# Patient Record
Sex: FEMALE | Employment: STUDENT | URBAN - METROPOLITAN AREA
[De-identification: names, ages, dates, MRNs, and addresses within clinical notes are randomized per-mention and may not be internally consistent; named-entity substitution may affect disease eponyms.]

---

## 2017-01-04 ENCOUNTER — COMMUNICATION - HEALTHEAST (OUTPATIENT)
Dept: FAMILY MEDICINE | Facility: CLINIC | Age: 22
End: 2017-01-04

## 2017-05-30 ENCOUNTER — COMMUNICATION - HEALTHEAST (OUTPATIENT)
Dept: FAMILY MEDICINE | Facility: CLINIC | Age: 22
End: 2017-05-30

## 2017-05-30 DIAGNOSIS — F32.A DEPRESSION: ICD-10-CM

## 2017-10-13 ENCOUNTER — COMMUNICATION - HEALTHEAST (OUTPATIENT)
Dept: FAMILY MEDICINE | Facility: CLINIC | Age: 22
End: 2017-10-13

## 2017-10-13 DIAGNOSIS — F32.A DEPRESSION: ICD-10-CM

## 2018-01-19 ENCOUNTER — COMMUNICATION - HEALTHEAST (OUTPATIENT)
Dept: FAMILY MEDICINE | Facility: CLINIC | Age: 23
End: 2018-01-19

## 2018-01-19 DIAGNOSIS — F32.A DEPRESSION: ICD-10-CM

## 2020-02-06 ENCOUNTER — TELEPHONE (OUTPATIENT)
Dept: OTHER | Facility: OUTPATIENT CENTER | Age: 25
End: 2020-02-06

## 2020-02-06 NOTE — TELEPHONE ENCOUNTER
[facility] Telephone Intake    Date:  2020  Client Name:  Ramila Woods  Preferred Name: Obie (they/them)  MRN:  [id number]   :  1995       Age:  24 year old     Presenting Problem / Reason for Assessment   (Clinical History &Symptoms):      Pt is interested in establishing care at [facility], also looking for a referral to see Morteza.     Suggested Program:TG     Seen Other Providers (if so, where):  M.D. :  Saad hernandez [place]  Therapist:  therapist  Psychiatrist:  No    Referral Source:  pcp Saad Tierney    Follow Up:    Insurance Benefits to be evaluated.  Note will be entered when validated.    Patient wishes to be contacted regarding Insurance benefits:  Yes-pt about to turn 25 in 2 days, worried their coverage will end.     Please Verify Registration    Please send Welcome Packet and document date sent.

## 2020-02-07 NOTE — TELEPHONE ENCOUNTER
.Per: BENNIE diaz/ [employer]  Copay$ 0   Ded$ 0; Met$ 0   Coins 20%    Out of Pocket Max$ 3,000 ; Met$ 202.41     Psych testing require auth ([id number])? yes  Extended therapy require auth (78116)?  no    Exclusions:   Family Therapy ([id number])  NO    Marriage couple counseling  YES   Transgender/Gender Dysphoria no   CSB no   Sexual dysfunction yes    Patient Contacted about benefits:  LEFT VOICEMAIL  Date contacted: 02/07/2020

## 2020-02-13 ENCOUNTER — OFFICE VISIT (OUTPATIENT)
Dept: OTHER | Facility: OUTPATIENT CENTER | Age: 25
End: 2020-02-13
Payer: MEDICAID

## 2020-02-13 DIAGNOSIS — F33.41 RECURRENT MAJOR DEPRESSIVE DISORDER, IN PARTIAL REMISSION (H): Primary | ICD-10-CM

## 2020-02-13 DIAGNOSIS — F41.1 GAD (GENERALIZED ANXIETY DISORDER): ICD-10-CM

## 2020-02-13 ASSESSMENT — ANXIETY QUESTIONNAIRES
6. BECOMING EASILY ANNOYED OR IRRITABLE: NOT AT ALL
7. FEELING AFRAID AS IF SOMETHING AWFUL MIGHT HAPPEN: SEVERAL DAYS
3. WORRYING TOO MUCH ABOUT DIFFERENT THINGS: MORE THAN HALF THE DAYS
1. FEELING NERVOUS, ANXIOUS, OR ON EDGE: NEARLY EVERY DAY
GAD7 TOTAL SCORE: 13
2. NOT BEING ABLE TO STOP OR CONTROL WORRYING: MORE THAN HALF THE DAYS
5. BEING SO RESTLESS THAT IT IS HARD TO SIT STILL: MORE THAN HALF THE DAYS

## 2020-02-13 ASSESSMENT — PATIENT HEALTH QUESTIONNAIRE - PHQ9
SUM OF ALL RESPONSES TO PHQ QUESTIONS 1-9: 21
5. POOR APPETITE OR OVEREATING: NEARLY EVERY DAY
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
## 2020-02-14 ASSESSMENT — ANXIETY QUESTIONNAIRES: GAD7 TOTAL SCORE: 13

## 2020-03-05 ENCOUNTER — TELEPHONE (OUTPATIENT)
Dept: PLASTIC SURGERY | Facility: CLINIC | Age: 25
End: 2020-03-05

## 2020-03-05 ENCOUNTER — OFFICE VISIT (OUTPATIENT)
Dept: OTHER | Facility: OUTPATIENT CENTER | Age: 25
End: 2020-03-05
Payer: MEDICAID

## 2020-03-05 DIAGNOSIS — F41.1 GAD (GENERALIZED ANXIETY DISORDER): ICD-10-CM

## 2020-03-05 DIAGNOSIS — F64.0 GENDER DYSPHORIA IN ADOLESCENT AND ADULT: Primary | ICD-10-CM

## 2020-03-05 DIAGNOSIS — F64.0 GENDER DYSPHORIA IN ADOLESCENT AND ADULT: ICD-10-CM

## 2020-03-05 DIAGNOSIS — F33.41 RECURRENT MAJOR DEPRESSIVE DISORDER, IN PARTIAL REMISSION (H): Primary | ICD-10-CM
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
## 2020-03-05 NOTE — TELEPHONE ENCOUNTER
[facility]:  Care Coordination Note     SITUATION   Obie Woods (they/them) is a 25 year old adult who is receiving support for:  Clinic Care Coordination - Face To Face (Scheduled top surgery consult with Dr. Justice)  .    BACKGROUND     Pt is interested in top surgery.     Pt may be a canidate for keyhole     Pt is not on hormones, not diabetic and is not a smoker.    Pt is working with Evaristo Reina @ [facility] to obtain LOS.    ASSESSMENT     Surgery              CGC Assessment  Comprehensive Gender Care (CGC) Enrollment: Enrolled  Patient has a therapist: Yes  Name of therapist: Tal Reina @ [facility]  Therapist's phone number: [phone]  Letter of support #1: Requested  Surgery being considered: Yes  Mastectomy: Yes  Mammogram completed: No          PLAN     Follow-up plan:  Pt is to attend scheduled consult with Dr. Justice 12/8/2020       Brendan Salmeron

## 2020-04-16 ENCOUNTER — VIRTUAL VISIT (OUTPATIENT)
Dept: OTHER | Facility: OUTPATIENT CENTER | Age: 25
End: 2020-04-16
Payer: MEDICAID

## 2020-04-16 DIAGNOSIS — F64.0 GENDER DYSPHORIA IN ADOLESCENT AND ADULT: Primary | ICD-10-CM

## 2020-04-16 DIAGNOSIS — F41.1 GAD (GENERALIZED ANXIETY DISORDER): ICD-10-CM

## 2020-04-16 NOTE — PROGRESS NOTES
Telemedicine Visit: The patient's condition can be safely assessed and treated via synchronous audio and visual telemedicine encounter.      Reason for Telemedicine Visit: Services only offered telehealth    Originating Site (Patient Location): Patient's home    Distant Site (Provider Location): Provider Remote Setting    Consent:  The patient/guardian has verbally consented to: the potential risks and benefits of telemedicine (video visit) versus in person care; bill my insurance or make self-payment for services provided; and responsibility for payment of non-covered services.     Mode of Communication:  Video Conference via [employer]. Video took 20 minutes to connect because of slow system problems, so the session began over phone and switched to video once it was able to connect.    As the provider I attest to compliance with applicable laws and regulations related to telemedicine.      [facility] -  Case Progress Note    Date of Service: 20   Name: Ramila Woods  : 1995  Medical Record Number: [id number]  Treating Provider: Tal Reina, Ph.D.,   Type of Session: Individual  Present in Session: Tal Ragland  Number of Minutes:  54    Current Symptoms/Status:  Client reports ongoing concerns related to gender dysphoria, and navigating interpersonal relationships.    Progress Toward Treatment Goals:   Client reported that they had to move to [place] after they were removed from their dorm because of Covid-19. Client reported that they have been having difficulty sleeping, with a lot of problems related to falling asleep. Client also discussed connecting recently with their friend and spending a week with her. Client described their experience in past relationships with family members who have had problems with substance use and addiction, and how they have a hard time trusting anyone else because they have seen how someone can change suddenly as a result of substance  "use. Client identified that their friend hasn't exhibited any behavior that would make them worried, but that it is difficult to trust and to commit. We discussed what \"commitment\" means to them, and Client identified having a lot of expectations that were tied to larger societal scripts. We also discussed ways they could communicate with their friend about their concerns.    Intervention: Modality and Description:  Communication strategies related to how they can talk with their friend were discussed. Emotion-focused techniques were also employed to help Client explore the feelings that were coming up for them related to their difficulties trusting others. Cognitive strategies to help client unpack their assumptions regarding relational constructs (e.g., commitment) were also used. Numerous strategies for addressing insomnia were also discussed, including limiting caffeine intake, setting an alarm to wake up earlier in the morning, mindfulness meditation before bed, and getting out of bed to read for a few minutes if they are having difficulty falling asleep.    Response to Intervention:  Client was open and engaged and appeared to deeply consider the interventions employed. Reframing strategies for helping them shift their question, \"is my relationship normal\" to \"what do I want and need in my relationship\" were particularly helpful for client in understanding other important questions that were coming up for them about their relationship.    Assignment:  None given at this time.    DSM-5 Diagnoses:  F64.0, F41.1    Plan/Need for Future Services:  Return for therapy in 1-2 weeks to treat diagnosed problems.      Tal Reina, Ph.D., LP      "

## 2020-09-28 ENCOUNTER — VIRTUAL VISIT (OUTPATIENT)
Dept: OTHER | Facility: OUTPATIENT CENTER | Age: 25
End: 2020-09-28
Payer: COMMERCIAL

## 2020-09-28 DIAGNOSIS — F41.1 GAD (GENERALIZED ANXIETY DISORDER): ICD-10-CM

## 2020-09-28 DIAGNOSIS — F64.0 GENDER DYSPHORIA IN ADULT: Primary | ICD-10-CM

## 2020-09-28 NOTE — PROGRESS NOTES
[facility] -  Case Progress Note    Date of Service: 20   Name: Ramila Barnes)  : 1995  Medical Record Number: [id number]  Treating Provider: Nabil Bahena, PhD  Type of Session: Individual  Present in Session:Nabil Ragland  Number of Minutes: 53  Video start time: 10:06am  Video end time: 10:59am    Telemedicine Visit: The patient's condition can be safely assessed and treated via synchronous audio and visual telemedicine encounter.       Reason for Telemedicine Visit: Services only offered telehealth     Originating Site (Patient Location): Patient's home     Distant Site (Provider Location): Provider Remote Setting     Consent:  The patient/guardian has verbally consented to: the potential risks and benefits of telemedicine (video visit) versus in person care; bill my insurance or make self-payment for services provided; and responsibility for payment of non-covered services.      Mode of Communication:  Video Conference via [employer]. Session began at 10:06am and ended at 10:59am without any technical difficulties.     As the provider I attest to compliance with applicable laws and regulations related to telemedicine.    Current Symptoms/Status:  Based on available records, client has an established history of experiencing gender dysphoria, psychological distress stemming from incongruence with gender identity and sex assigned at birth exacerbated by primary and secondary sex characteristics. Client reported experiencing the above-mentioned symptoms as well as several depressive symptoms, including social isolation, lack of interest, and sadness. Client reported having difficulties with falling and staying asleep, and experiencing low energy, lethargy, and flattened mood. Client reports ongoing concerns related to gender dysphoria and navigating interpersonal relationships. Client reported a history of family trauma related to substance use and addiction, difficulties being  accepted as non-binary by family, and navigating societal expectations of relationships. Client reported having difficulties with falling and staying asleep, and experiencing low energy, lethargy, and flattened mood. Client reported a history of family trauma related to substance use and addiction, difficulties being accepted as non-binary by family, and navigating societal expectations of relationships.    Progress Toward Treatment Goals:   This was the client's first individual psychotherapy session with writer (case was transferred from Dr. Tal Reina, PhD, LP). Client was able to identify the following goals for treatment: (1) emotionally work through transition-related issues, (2) improve communication in relationship (open discussion of boundaries, etc.), (3) learn new skills to cope with feelings of gender dysphoria, and (4) work toward building trust in their new relationship. Lastly, we discussed expectations regarding individual psychotherapy and client requested to meet every two weeks. Writer instructed client to contact the  for assistance with scheduling.    Intervention: Modality and Description:  The beginning of the session focused on obtaining client's psychosocial history, treatment history, identifying goals for individual psychotherapy work with writer and building rapport. We explored the client's communication style and potential barriers to articulating their needs. Cognitive behavioral strategies were used help patient identify how some of their beliefs were associated and informed by negative automatic thoughts fueling expectations that others will disappoint them in relationships. Guided imagery was used to help client visualize what their hopes/fantasies/goals are for their relationship. Psychoeducation was offered concerning sleep hygiene and strategies for addressing insomnia (e.g. monitoring caffeine intake later in the afternoon/evening, etc.), as well as client's concerns  that their current medication might be causing increased lethargy, and low energy and mood. Client reported that they just moved back to [place] from [place] and has been living with her partner for about 3 weeks. Writer instructed client to pay attention to changes in their mood and energy level as well as changes (e.g. moving from [place] to [place], living with their partner, etc.) in their living environment and report those symptoms to their psychiatrist. We discussed what the client's hopes are regarding this new relationship and ways that they can communicate their needs openly with their partner to foster greater sense of emotional safety.     Response to Intervention:  Client was open, engaged, and receptive to strategies and interventions used in session. Client was collaborative in sharing their psychosocial history and disclosing recent relational challenges. Client stated they would like to work toward having more open conversations with their partners about both of their boundaries and would try to do so prior to our next session.    Assignment:  Client will work toward having an open conversation about partner's boundaries.    DSM-5 Diagnoses:  302.85 (F64.0)  Gender Dysphoria in adolescent and adult  300.02 (F41.1) Generalized Anxiety Disorder    Plan/Need for Future Services:  Return for therapy in 2 weeks to treat diagnosed problems.        Nabil Bahena, PhD   Postdoctoral Fellow

## 2020-10-12 ENCOUNTER — TELEPHONE (OUTPATIENT)
Dept: PSYCHOLOGY | Facility: CLINIC | Age: 25
End: 2020-10-12

## 2020-10-13 NOTE — PROGRESS NOTES
Writer contacted [facility] on 10/7/20 at at 11:05am for scheduled individual psychotherapy appointment. Client was driving and stated that they received a message from [facility] stating that their appointment would be taking place in person at the clinic. Client drove to the clinic and spoke with  staff who informed them that appointments were taking place virtually. Client was unable to meet for virtual appointment as they were driving back home. Writer asked client to contact the  and reschedule their session.     Nabil Bahena, Ph.D.  Postdoctoral Fellow

## 2020-10-19 ENCOUNTER — VIRTUAL VISIT (OUTPATIENT)
Dept: PSYCHOLOGY | Facility: CLINIC | Age: 25
End: 2020-10-19
Payer: COMMERCIAL

## 2020-10-19 DIAGNOSIS — F64.0 GENDER DYSPHORIA IN ADOLESCENT AND ADULT: Primary | ICD-10-CM

## 2020-10-19 DIAGNOSIS — F41.1 GENERALIZED ANXIETY DISORDER: ICD-10-CM

## 2020-10-19 PROCEDURE — 90837 PSYTX W PT 60 MINUTES: CPT | Mod: U7 | Performed by: STUDENT IN AN ORGANIZED HEALTH CARE EDUCATION/TRAINING PROGRAM

## 2020-10-19 PROCEDURE — 99207 PR NO CHARGE LOS: CPT | Performed by: STUDENT IN AN ORGANIZED HEALTH CARE EDUCATION/TRAINING PROGRAM

## 2020-10-24 NOTE — TELEPHONE ENCOUNTER
Writer contacted [facility] on 10/7/20 at at 11:05am for scheduled individual psychotherapy appointment. Client was driving and stated that they received a message from [facility] stating that their appointment would be taking place in person at the clinic. Client drove to the clinic and spoke with  staff who informed them that appointments were taking place virtually. Client was unable to meet for virtual appointment as they were driving back home. Writer asked client to contact the  and reschedule their session.     Nabil Bahena, Ph.D.  Postdoctoral Fellow

## 2020-10-25 NOTE — PROGRESS NOTES
[place] [facility] -  Case Progress Note    Date of Service: 10/19/20   Name: Ramila Barnes)  : 1995  Medical Record Number: [id number]  Treating Provider: Nabil Bahena, PhD  Type of Session: Individual  Present in Session: Nabil Ragland  Number of Minutes:  53  Video start time: 10:02am  Video end time: 10:55am    Telemedicine Visit: The patient's condition can be safely assessed and treated via synchronous audio and visual telemedicine encounter.       Reason for Telemedicine Visit: Services only offered telehealth     Originating Site (Patient Location): Patient's home     Distant Site (Provider Location): Provider Remote Setting     Consent:  The patient/guardian has verbally consented to: the potential risks and benefits of telemedicine (video visit) versus in person care; bill my insurance or make self-payment for services provided; and responsibility for payment of non-covered services.      Mode of Communication:  Video Conference via [employer]. Session began at 10:02am and ended at 10:55am without any technical difficulties.     As the provider I attest to compliance with applicable laws and regulations related to telemedicine.    Current Symptoms/Status:  Based on available records, client has an established history of experiencing gender dysphoria, psychological distress stemming from incongruence with gender identity and sex assigned at birth exacerbated by primary and secondary sex characteristics. Client reported experiencing the above-mentioned symptoms as well as several depressive symptoms, including social isolation, lack of interest, and sadness. Client reports ongoing concerns related to gender dysphoria and navigating interpersonal relationships. Client reported a history of family trauma related to substance use and addiction, difficulties being accepted as non-binary by family, and navigating societal expectations of relationships. Client reported having difficulties with  falling and staying asleep, and experiencing low energy, lethargy, and flattened mood.     Progress Toward Treatment Goals:   Client reported making  progress in their interpersonal relationships. Client expressed commitment to working toward their treatment goals: (1) emotionally work through transition-related issues, (2) improve communication in relationship (open discussion of boundaries, etc.), (3) learn new skills to cope with feelings of gender dysphoria, and (4) work toward building trust in their new relationship.    Intervention: Modality and Description:  The session focused building rapport and identifying areas to work on. Therapist used emotion-focused techniques and CBT to begin processing relational conflict and insecurities related to establishing effective boundaries in relationships. Client reported wanting learn how to navigate boundaries and open communication in their relationship with their partner. We discussed what the client's hopes are regarding this new relationship and ways that they can communicate their needs openly with their partner to foster greater sense of emotional safety. Cognitive behavioral strategies were used help patient identify how some of their beliefs were associated and informed by negative automatic thoughts fueling expectations that others will disappoint them in relationships. Guided imagery was used to help client visualize what their hopes/fantasies/goals are for their relationship. Psychoeducation was offered concerning sleep hygiene and strategies for addressing insomnia (e.g. monitoring caffeine intake later in the afternoon/evening, etc.), as well as client's concerns that their current medication might be causing increased lethargy, and low energy and mood. Writer instructed client to pay attention to changes in their mood and energy level as well as changes (e.g. living with their partner, etc.) in their living environment and report those symptoms to their  psychiatrist    Response to Intervention:  Client was open, engaged, and receptive to strategies and interventions used in session. Client was collaborative in sharing their psychosocial history and disclosing recent relational challenges. Client stated they would like to work toward having more open conversations with their partners about both of their boundaries and would try to do so prior to our next session.    Assignment:  Client will work toward having an open conversation about partner's boundaries.    Interactive Complexity:  Not applicable.    DSM-5 Diagnoses:  302.85 (F64.0)  Gender Dysphoria in adolescent and adult  300.02 (F41.1) Generalized Anxiety Disorder    Plan/Need for Future Services:  Return for therapy in 2 weeks to treat diagnosed problems.        Nabil Bahena, PhD  Postdoctoral Fellow

## 2020-11-02 ENCOUNTER — VIRTUAL VISIT (OUTPATIENT)
Dept: PSYCHOLOGY | Facility: CLINIC | Age: 25
End: 2020-11-02
Payer: COMMERCIAL

## 2020-11-02 DIAGNOSIS — F64.0 GENDER DYSPHORIA IN ADOLESCENT AND ADULT: Primary | ICD-10-CM

## 2020-11-02 DIAGNOSIS — F41.1 GENERALIZED ANXIETY DISORDER: ICD-10-CM

## 2020-11-02 DIAGNOSIS — F43.21 ADJUSTMENT DISORDER WITH DEPRESSED MOOD: ICD-10-CM

## 2020-11-02 PROCEDURE — 90834 PSYTX W PT 45 MINUTES: CPT | Mod: U7 | Performed by: STUDENT IN AN ORGANIZED HEALTH CARE EDUCATION/TRAINING PROGRAM

## 2020-11-02 PROCEDURE — 99207 PR NO CHARGE LOS: CPT | Performed by: STUDENT IN AN ORGANIZED HEALTH CARE EDUCATION/TRAINING PROGRAM

## 2020-11-02 NOTE — PROGRESS NOTES
I did not personally see the patient. I reviewed and agree with the assessment and plan of this note.       Vanessa Hebert, PhD, LP    Coordinator, Transgender Health Services  Program in [facility] Sexuality[facility]  Department of Family Medicine and Community Health  [facility]

## 2020-11-02 NOTE — PROGRESS NOTES
[place] -  Case Progress Note    Date of Service: 20   Name: Ramila Barnes)  : 1995  Medical Record Number: [id number]  Treating Provider: Nabil Bahena, PhD  Type of Session: Individual  Present in Session: Nabil Ragland  Number of Minutes:  42  Video start time: 10:14am  Video end time: 10:56am    Telemedicine Visit: The patient's condition can be safely assessed and treated via synchronous audio and visual telemedicine encounter.       Reason for Telemedicine Visit: Services only offered telehealth     Originating Site (Patient Location): Patient's home     Distant Site (Provider Location): Provider Remote Setting     Consent:  The patient/guardian has verbally consented to: the potential risks and benefits of telemedicine (video visit) versus in person care; bill my insurance or make self-payment for services provided; and responsibility for payment of non-covered services.      Mode of Communication:  Video Conference via [employer]. Session began at 10:14am and ended at 10:56am without any technical difficulties.     As the provider I attest to compliance with applicable laws and regulations related to telemedicine.    Current Symptoms/Status:  Client has an established history of experiencing gender dysphoria, psychological distress stemming from incongruence with gender identity and sex assigned at birth exacerbated by primary and secondary sex characteristics. Client reported experiencing the above-mentioned symptoms as well as several depressive symptoms, including social isolation, lack of interest, and sadness. Client reports ongoing concerns related to gender dysphoria and navigating interpersonal relationships. Client reported a history of family trauma related to substance use and addiction, difficulties being accepted as non-binary by family, and navigating societal expectations of relationships. Client reported having difficulties with falling and staying asleep,  and experiencing low energy, lethargy, and flattened mood.     Progress Toward Treatment Goals:   Client reported making  progress in their interpersonal relationships but struggling with symptoms of depression. Client expressed commitment to working toward their treatment goals: (1) emotionally work through transition-related issues, (2) improve communication in relationship (open discussion of boundaries, etc.), (3) learn new skills to cope with feelings of gender dysphoria, and (4) work toward building trust in their new relationship.    Intervention: Modality and Description:  The session focused building rapport and identifying areas to work on. Therapist used emotion-focused techniques and CBT to begin processing relational conflict and insecurities related to establishing effective boundaries in relationships. Client also reported experiencing symptoms of depression (sadness, fatigue, low energy and interest in pleasurable activities, low motivation, sleep disturbance: difficulty stay asleep and waking up several times in the middle of the night). Client reported feeling overwhelmed and having difficulty keeping to a schedule. Client reported difficulty with concentration and focus. Client feels like they are not working hard enough (e.g not meeting my daily and weekly goals). Client was able to recognize that they often push themselves too hard, but even minor tasks feel very exhausting. Cognitive behavioral strategies were used to help patient identify core beliefs and negative automatic thoughts associated with reduced work productivity and increased anxiety. Therapist and client discussed self-care strategies to help them stay well this week given all of the sociopolitical stressors related to the election. Psychoeducation was offered concerning sleep hygiene and strategies for addressing insomnia (e.g. monitoring caffeine intake later in the afternoon/evening, etc.). Writer instructed client to contiue  paying attention to changes in their mood and energy level as well as changes (e.g. living with their partner, etc.) in their living environment and report those symptoms to their psychiatrist.    Response to Intervention:  Client was open, engaged, and receptive to strategies and interventions used in session.  Client stated they would like to work toward noticing their symptoms and communicating them to her psychiatrist. Client was open to developing more self-care strategies to promote their own wellness.     Assignment:  Client will find one self-care activity to engage in with partner to reduce stress and improve interpersonal coping skills.    Interactive Complexity:  Not applicable.    DSM-5 Diagnoses:  302.85 (F64.0)  Gender Dysphoria in adolescent and adult  300.02 (F41.1) Generalized Anxiety Disorder  309.0 (F43.21) Adjustment Disorder with depressed mood    Plan/Need for Future Services:  Return for therapy in 2 weeks to treat diagnosed problems.        Nabil Bahena, PhD  Postdoctoral Fellow

## 2020-11-09 NOTE — PROGRESS NOTES
I did not personally see the patient. I reviewed and agree with the assessment and plan of this note.       Vanessa Hebert, PhD, LP    Coordinator, Transgender Health Services  Program in Human Sexuality, [facility]  Department of Family Medicine and Community Health  [facility]

## 2020-11-12 NOTE — TELEPHONE ENCOUNTER
FUTURE VISIT INFORMATION      FUTURE VISIT INFORMATION:    Date: 12/23/20    Time: 9:00am    [place] [facility] [place]  REFERRAL INFORMATION:    Referring provider:  self    Referring providers clinic:  N/A    Reason for visit/diagnosis  Gender Care    RECORDS REQUESTED FROM:       No recs to collect

## 2020-11-16 ENCOUNTER — VIRTUAL VISIT (OUTPATIENT)
Dept: PSYCHOLOGY | Facility: CLINIC | Age: 25
End: 2020-11-16
Payer: COMMERCIAL

## 2020-11-16 DIAGNOSIS — F43.21 ADJUSTMENT DISORDER WITH DEPRESSED MOOD: ICD-10-CM

## 2020-11-16 DIAGNOSIS — F64.0 GENDER DYSPHORIA IN ADOLESCENT AND ADULT: Primary | ICD-10-CM

## 2020-11-16 DIAGNOSIS — F41.1 GAD (GENERALIZED ANXIETY DISORDER): ICD-10-CM

## 2020-11-16 PROCEDURE — 99207 PR NO CHARGE LOS: CPT | Performed by: STUDENT IN AN ORGANIZED HEALTH CARE EDUCATION/TRAINING PROGRAM

## 2020-11-16 PROCEDURE — 90837 PSYTX W PT 60 MINUTES: CPT | Mod: U7 | Performed by: STUDENT IN AN ORGANIZED HEALTH CARE EDUCATION/TRAINING PROGRAM

## 2020-11-16 NOTE — PROGRESS NOTES
[place] [facility] -  Case Progress Note    Date of Service: 20   Name: Ramila Barnes)  : 1995  Medical Record Number: [id number]  Treating Provider: Nabil Bahena, PhD  Type of Session: Individual  Present in Session: Nabil Ragland  Number of Minutes:  53  Video start time: 10:03am  Video end time: 10:56am    Telemedicine Visit: The patient's condition can be safely assessed and treated via synchronous audio and visual telemedicine encounter.       Reason for Telemedicine Visit: Services only offered telehealth     Originating Site (Patient Location): Patient's home     Distant Site (Provider Location): Provider Remote Setting     Consent:  The patient/guardian has verbally consented to: the potential risks and benefits of telemedicine (video visit) versus in person care; bill my insurance or make self-payment for services provided; and responsibility for payment of non-covered services.      Mode of Communication:  Video Conference via [employer]. Session began at 10:03am and ended at 10:56am without any technical difficulties.     As the provider I attest to compliance with applicable laws and regulations related to telemedicine.    Current Symptoms/Status:  Client has an established history of experiencing gender dysphoria, psychological distress stemming from incongruence with gender identity and sex assigned at birth exacerbated by primary and secondary sex characteristics. Client reported experiencing the above-mentioned symptoms as well as several depressive symptoms, including social isolation, lack of interest, and sadness. Client reports ongoing concerns related to gender dysphoria and navigating interpersonal relationships. Client reported a history of family trauma related to substance use and addiction, difficulties being accepted as non-binary by family, and navigating societal expectations of relationships. Client reported having difficulties with falling and staying asleep,  and experiencing low energy, lethargy, and flattened mood.     Progress Toward Treatment Goals:   Client reported making  progress in their interpersonal relationships but struggling with symptoms of depression. Client expressed commitment to working toward their treatment goals: (1) emotionally work through transition-related issues, (2) improve communication in relationship (open discussion of boundaries, etc.), (3) learn new skills to cope with feelings of gender dysphoria, and (4) work toward building trust in their new relationship.    Intervention: Modality and Description:  The session focused building rapport and identifying areas to work on. Therapist used Motivational Interviewing and CBT to continue processing lack of motivation, fatigue, symptoms of depression, and feelings of low self-worth (e.g. not accomplishing enough, not working hard enough). Client also reported experiencing symptoms of depression (sadness, fatigue, low energy and interest in pleasurable activities, low motivation, sleep disturbance: difficulty stay asleep and waking up several times in the middle of the night). Client reported feeling overwhelmed and having difficulty keeping to a schedule. Client reported difficulty with concentration and focus. Client feels like they are not working hard enough (e.g not meeting my daily and weekly goals). Client also reported identity-related stress associated with their native heritage. Client was able to recognize that they often push themselves too hard, but even minor tasks feel very exhausting. Cognitive behavioral strategies were used to help patient identify core beliefs and negative automatic thoughts associated with reduced work productivity and increased anxiety. Client reported that their ADHD seems to be impacting their productivity. Writer instructed client to contiue paying attention to changes in their mood and energy level as well as changes (e.g. increased difficulty  concentrating, etc.) in their living environment and report those symptoms to their psychiatrist.Client has a top surgery consult and voice therapy scheduled for December.     Response to Intervention:  Client was open, engaged, and receptive to strategies and interventions used in session.  Client stated they would like to work toward noticing their symptoms and communicating them to her psychiatrist. Client was open to developing more self-care strategies to promote their own wellness.     Assignment:  Client will find self-care activities to engage in with partner to reduce stress and improve interpersonal coping skills.    Interactive Complexity:  Not applicable.    DSM-5 Diagnoses:  302.85 (F64.0)  Gender Dysphoria in adolescent and adult  300.02 (F41.1) Generalized Anxiety Disorder  309.0 (F43.21) Adjustment Disorder with depressed mood    Plan/Need for Future Services:  Return for therapy in 2 weeks to treat diagnosed problems.        Nabil Bahena, PhD  Postdoctoral Fellow

## 2020-11-16 NOTE — PROGRESS NOTES
I did not personally see the patient. I reviewed and agree with the assessment and plan of this note.       Vanessa Hebert, PhD, LP    Coordinator, Transgender Health Services  Program in Human Sexuality, [facility]  Department of Family Medicine and Community Health  [facility]

## 2020-11-19 NOTE — PROGRESS NOTES
I did not personally see the patient. I reviewed and agree with the assessment and plan of this note.       Vanessa Hebert, PhD, LP    Program in Human Sexuality, [facility]  Department of Family Medicine and Community Health  [facility]

## 2020-11-30 ENCOUNTER — APPOINTMENT (OUTPATIENT)
Dept: PSYCHOLOGY | Facility: CLINIC | Age: 25
End: 2020-11-30
Payer: COMMERCIAL

## 2020-12-02 PROBLEM — Z91.09 ENVIRONMENTAL ALLERGIES: Status: ACTIVE | Noted: 2020-10-14

## 2020-12-02 PROBLEM — Q31.8 VOCAL CORD ANOMALY: Status: ACTIVE | Noted: 2020-10-14

## 2020-12-02 PROBLEM — F41.1 GAD (GENERALIZED ANXIETY DISORDER): Status: ACTIVE | Noted: 2019-08-14

## 2020-12-02 PROBLEM — G47.00 INSOMNIA, UNSPECIFIED: Status: ACTIVE | Noted: 2020-10-14

## 2020-12-02 PROBLEM — F43.12 CHRONIC POST-TRAUMATIC STRESS DISORDER (PTSD): Status: ACTIVE | Noted: 2020-10-14

## 2020-12-08 ENCOUNTER — VIRTUAL VISIT (OUTPATIENT)
Dept: PLASTIC SURGERY | Facility: CLINIC | Age: 25
End: 2020-12-08
Attending: PLASTIC SURGERY
Payer: COMMERCIAL

## 2020-12-08 VITALS — WEIGHT: 130 LBS | BODY MASS INDEX: 20.89 KG/M2 | HEIGHT: 66 IN

## 2020-12-08 DIAGNOSIS — F64.0 GENDER DYSPHORIA IN ADULT: Primary | ICD-10-CM

## 2020-12-08 PROCEDURE — 99203 OFFICE O/P NEW LOW 30 MIN: CPT | Mod: 95 | Performed by: PLASTIC SURGERY

## 2020-12-08 RX ORDER — VENLAFAXINE HYDROCHLORIDE 225 MG/1
225 TABLET, EXTENDED RELEASE ORAL DAILY
COMMUNITY

## 2020-12-08 RX ORDER — TRAZODONE HYDROCHLORIDE 100 MG/1
100 TABLET ORAL AT BEDTIME
COMMUNITY

## 2020-12-08 RX ORDER — GLYCOPYRROLATE 2 MG/1
TABLET ORAL
COMMUNITY
Start: 2020-12-07 | End: 2022-05-02

## 2020-12-08 ASSESSMENT — PAIN SCALES - GENERAL: PAINLEVEL: NO PAIN (0)

## 2020-12-08 ASSESSMENT — MIFFLIN-ST. JEOR: SCORE: 1346.43

## 2020-12-08 NOTE — NURSING NOTE
"Chief Complaint   Patient presents with     Consult     new top A cup or smaller       Vitals:    12/08/20 1200   Weight: 59 kg (130 lb)   Height: 1.676 m (5' 6\")       Body mass index is 20.98 kg/m .    Elijah Feliz, EMT    "

## 2020-12-08 NOTE — LETTER
"12/8/2020       RE: Ramila Woods  [address]  [address]  [facility] [address]     Dear Colleague,    Thank you for referring your patient, Ramila Woods, to the [facility] PLASTIC AND RECONSTRUCTIVE SURGERY CLINIC [place] at [facility]. Please see a copy of my visit note below.    Ramila Woods is a 25 year old adult who is being evaluated via a billable video visit.      The patient has been notified of following:     \"This video visit will be conducted via a call between you and your physician/provider. We have found that certain health care needs can be provided without the need for an in-person physical exam.  This service lets us provide the care you need with a video conversation.  If a prescription is necessary we can send it directly to your pharmacy.  If lab work is needed we can place an order for that and you can then stop by our lab to have the test done at a later time.    Video visits are billed at different rates depending on your insurance coverage.  Please reach out to your insurance provider with any questions.    If during the course of the call the physician/provider feels a video visit is not appropriate, you will not be charged for this service.\"    Patient has given verbal consent for Video visit? Yes  How would you like to obtain your AVS? MyChart  If you are dropped from the video visit, the video invite should be resent to: Text to cell phone: [phone]  Will anyone else be joining your video visit? No          Video-Visit Details    Type of service:  Video Visit    Video Start Time: 12:30 PM  Video End Time: 1:00 PM    Originating Location (pt. Location): Home    Distant Location (provider location):  [facility] PLASTIC AND RECONSTRUCTIVE SURGERY [facility]     Platform used for Video Visit: Ronny Justice MD      REFERRING PROVIDER: Tal Reina    REASON FOR CONSULTATION: Gender dysphoria, requesting " "top surgery.    HPI: Patient is a 25-year-old gender nonbinary individual who prefers they them pronouns, referred to me by Tal Reina for possible top surgery.  Patient has been considering surgery for the last 3 years.  They have done Internet research.  They have history of binding the chest for a year but discontinued this practice after encountering breathing problems.    They transitioned 2 years ago.  Preferred name is Obie.  Has considered hormone therapy but decided not to initiate.  Has history of undergoing ultrasound evaluation of the right chest for a mass in high school.  This showed benign results.    Patient feels that they are small breasted.  States that nipple sensation is not important to them.    MEDS:   Prior to Admission medications    Medication Sig Start Date End Date Taking? Authorizing Provider   glycopyrrolate (GLYCATE) 2 MG tablet  12/7/20  Yes Reported, Patient   traZODone (DESYREL) 100 MG tablet Take 100 mg by mouth At Bedtime   Yes Reported, Patient   venlafaxine (EFFEXOR-ER) 225 MG 24 hr tablet Take 225 mg by mouth daily   Yes Reported, Patient       ALLERGIES:   Allergies   Allergen Reactions     Dust Mites      Seasonal Allergies        PMH: Depression, PTSD, and anxiety.    PSH: None.    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    FH: Lupus.    ROS: Denies chest pain, shortness of breath, diabetes, MI, CVA, DVT, PE, and bleeding disorders.    PHYSICAL EXAMINATION: Ht 1.676 m (5' 6\")   Wt 59 kg (130 lb)   BMI 20.98 kg/m    General: No acute distress.  Chest examination was deferred given that this is a virtual visit with poor video feed quality.    ASSESSMENT: Gender dysphoria, requesting top surgery.    PLAN: I explained the need for a letter of support from a mental " health professional.  I am also recommending a baseline screening mammogram given the history of right breast ultrasound showing benign findings.  Patient will also need to return to see me in clinic for a physical examination.  We discussed the top surgery and the varying techniques available.  I explained the different techniques and the differences in terms of advantages and disadvantages.  I explained the risks include bleeding, infection, injury to surrounding structures, fluid collection, nipple or nipple graft loss, nipple sensory loss, change in nipple size, wound healing difficulties, contour deformity, dog ears, asymmetry, and need for revision surgery.  Patient would like to consider their options and will return to see me once letter of support is sent in and mammogram is obtained.    Total time spent with patient was 30 min of which greater than 50% was in counseling.    Again, thank you for allowing me to participate in the care of your patient.      Sincerely,    Tobias Justice MD

## 2020-12-08 NOTE — PROGRESS NOTES
"Ramila Woods is a 25 year old adult who is being evaluated via a billable video visit.      The patient has been notified of following:     \"This video visit will be conducted via a call between you and your physician/provider. We have found that certain health care needs can be provided without the need for an in-person physical exam.  This service lets us provide the care you need with a video conversation.  If a prescription is necessary we can send it directly to your pharmacy.  If lab work is needed we can place an order for that and you can then stop by our lab to have the test done at a later time.    Video visits are billed at different rates depending on your insurance coverage.  Please reach out to your insurance provider with any questions.    If during the course of the call the physician/provider feels a video visit is not appropriate, you will not be charged for this service.\"    Patient has given verbal consent for Video visit? Yes  How would you like to obtain your AVS? MyChart  If you are dropped from the video visit, the video invite should be resent to: Text to cell phone: [phone]  Will anyone else be joining your video visit? No        Video-Visit Details    Type of service:  Video Visit    Video Start Time: 12:30 PM  Video End Time: 1:00 PM    Originating Location (pt. Location): Home    Distant Location (provider location):  [facility] PLASTIC AND RECONSTRUCTIVE SURGERY CLINIC [place]     Platform used for Video Visit: Ronny Justice MD      REFERRING PROVIDER: Tal Reina    REASON FOR CONSULTATION: Gender dysphoria, requesting top surgery.    HPI: Patient is a 25-year-old gender nonbinary individual who prefers they them pronouns, referred to me by Tal Reina for possible top surgery.  Patient has been considering surgery for the last 3 years.  They have done Internet research.  They have history of binding the chest for a year but discontinued this practice " "after encountering breathing problems.    They transitioned 2 years ago.  Preferred name is Obie.  Has considered hormone therapy but decided not to initiate.  Has history of undergoing ultrasound evaluation of the right chest for a mass in high school.  This showed benign results.    Patient feels that they are small breasted.  States that nipple sensation is not important to them.    MEDS:   Prior to Admission medications    Medication Sig Start Date End Date Taking? Authorizing Provider   glycopyrrolate (GLYCATE) 2 MG tablet  12/7/20  Yes Reported, Patient   traZODone (DESYREL) 100 MG tablet Take 100 mg by mouth At Bedtime   Yes Reported, Patient   venlafaxine (EFFEXOR-ER) 225 MG 24 hr tablet Take 225 mg by mouth daily   Yes Reported, Patient       ALLERGIES:   Allergies   Allergen Reactions     Dust Mites      Seasonal Allergies        PMH: Depression, PTSD, and anxiety.    PSH: None.    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    ROS: Denies chest pain, shortness of breath, diabetes, MI, CVA, DVT, PE, and bleeding disorders.    PHYSICAL EXAMINATION: Ht 1.676 m (5' 6\")   Wt 59 kg (130 lb)   BMI 20.98 kg/m    General: No acute distress.  Chest examination was deferred given that this is a virtual visit with poor video feed quality.    ASSESSMENT: Gender dysphoria, requesting top surgery.    PLAN: I explained the need for a letter of support from a mental health professional.  I am also recommending a baseline screening mammogram given the history of right breast ultrasound showing benign findings.  Patient will also need to return to see me in clinic for a physical examination.  We discussed the top surgery and the varying techniques available.  I explained the different techniques and " the differences in terms of advantages and disadvantages.  I explained the risks include bleeding, infection, injury to surrounding structures, fluid collection, nipple or nipple graft loss, nipple sensory loss, change in nipple size, wound healing difficulties, contour deformity, dog ears, asymmetry, and need for revision surgery.  Patient would like to consider their options and will return to see me once letter of support is sent in and mammogram is obtained.    Total time spent with patient was 30 min of which greater than 50% was in counseling.

## 2020-12-23 ENCOUNTER — VIRTUAL VISIT (OUTPATIENT)
Dept: OTOLARYNGOLOGY | Facility: CLINIC | Age: 25
End: 2020-12-23
Payer: COMMERCIAL

## 2020-12-23 ENCOUNTER — PRE VISIT (OUTPATIENT)
Dept: OTOLARYNGOLOGY | Facility: CLINIC | Age: 25
End: 2020-12-23

## 2020-12-23 DIAGNOSIS — R49.9 VOICE AND RESONANCE DISORDER: Primary | ICD-10-CM

## 2020-12-23 DIAGNOSIS — R49.0 DYSPHONIA: ICD-10-CM

## 2020-12-23 DIAGNOSIS — J38.3 VOCAL CORD DYSFUNCTION: ICD-10-CM

## 2020-12-23 DIAGNOSIS — J38.7 IRRITABLE LARYNX: ICD-10-CM

## 2020-12-23 DIAGNOSIS — F64.9 GENDER DYSPHORIA: ICD-10-CM

## 2020-12-23 PROCEDURE — 99207 PR NO CHARGE LOS: CPT | Performed by: SPEECH-LANGUAGE PATHOLOGIST

## 2020-12-23 PROCEDURE — 92524 BEHAVRAL QUALIT ANALYS VOICE: CPT | Mod: GN | Performed by: SPEECH-LANGUAGE PATHOLOGIST

## 2020-12-23 NOTE — PROGRESS NOTES
Ramila Woods is a 25 year old adult who is being cared for via a billable virtual visit.        The patient has been notified and verbally consented to the following statements:     This video visit will be conducted between you and your provider.    Patient has opted to conduct today's video visit vs an in-person appointment, and is not able to attend due to possible exposure to COVID-19.      If during the course of the call the provider feels a video visit is not appropriate, you will not be charged for this service.    Provider has received verbal consent for billable virtual visit from the patient? Yes    Preferred method for receiving information: email    Call initiated at: 9:03 AM  Platform used to conduct today's virtual appointment: =AM Well video  Location of provider: Residence  Location of patient: [facility]  Dexter Wang Jr., M.D., F.A.C.S.  Jasmyn Cowart M.D., M.P.H.  Paty Macias M.D.  Malri Hook, Ph.D., CCC/SLP  Earnestine Byrd M.M. (voice), M.YODIT., CCC/SLP  Sagar Archibald M.M. (voice), M.A., CCC/SLP       Evaluation report    Clinician: Earnestine Byrd M.M. (voice), M.A., CCC/SLP  Referring physician:  Self  Patient: Ramila Woods  Date of Visit: 12/23/2020    HISTORY  Chief complaint: Obie Woods is a 25 year old presenting today for evaluation of voice and resonance disorder secondary to gender dysphoria  General Hx:  None binary/ Agender  They/ Them pronouns  They knew that they were not a girl their whole life, and 1.5-2 years ago they found that it was ok to not be a girl.  No hormones; would prefer not to take    CURRENT SYMPTOMS INCLUDE  VOICE    Voice therapy has never been pursued    Would like a voice that is more androgenous    Vocal deficiency- people found that they has VCD and makes it difficult with athletic, temp (hot cold) and smoke.     THROAT ISSUES     COUGH:  o Mostly a dry cough, which around a long time.     THROAT  "CLEARING:  o Frequent thorat clearing     GLOBUS:  o Sometimes, not often     SWALLOWING    Swallowing is not an issue    Occasionally it is usually occurs with dry foods.     RESPIRATION    Congestion today    Has had breathing problems all their life.    Friends say that they have weird breathing habits.     Takes a deep breath after holding     Allergic to dust mites and certain grasses.  The dust will sometimes make it hard to breathe, and they are not sure if they have sleep apnea, but one of their roommates said that used to gasp a lot in sleep .    They have not had a formal check of the sinuses.     ADDITIONAL    Reflux: can be a trigger for VCD, but ha snot noticed frequent reflux issues.     No Hx of smoking, but their parents smoked while they were a child.     OTHER PERTINENT HISTORY    Otherwise unremarkable.  Please also refer to their chart for additional history    No past medical history on file.  No past surgical history on file.    OBJECTIVE  PATIENT REPORTED MEASURES  Patient Supplied Answers To VHI Questionnaire  Voice Handicap Index (VHI-10) 12/23/2020   My voice makes it difficult for people to hear me 0   People have difficulty understanding me in a noisy room 0   My voice difficulties restrict my personal and social life.  2   I feel left out of conversations because of my voice 0   My voice problem causes me to lose income 0   I feel as though I have to strain to produce voice 2   The clarity of my voice is unpredictable 2   My voice problem upsets me 3   My voice makes me feel handicapped 2   People ask, \"What's wrong with your voice?\" 1   VHI-10 12       Patient Supplied Answers To CSI Questionnaire  No flowsheet data found.    Patient Supplied Answers To EAT Questionnaire  No flowsheet data found.    Patient Supplied Answers to Dyspnea Index Questionnaire:  Dyspnea Index 12/23/2020   1. I have trouble getting air in. 4   2. I feel tightness in my throat when I am having cintia breathing " problem. 3   3. It takes more effort to breathe than it used to. 4   4. Change in weather affect my breathing problem. 4   5. My breathing gets worse with stress. 2   6. I make sound/noise breathing in 3   7. I have to strain to breathe. 3   8. My shortness of breath gets worse with exercise or physical activity 4   9. My breathing problem makes me feel stressed. 2   10. My breathing problem casuses me to restrict my personal and social life. 1   Dyspnea Index Total Score 30   always has a low grade breathing issue that becomes exasperate by walking or other physical activity, or breathing in steam, etc.       PERCEPTUAL EVALUATION (CPT 02228)  POSTURE / TENSION:     upper body    neck and shoulders    BREATHING:     inspirations are inadequate in volume and frequency    clavicular elevation on inspiration    shoulder and neck involvement    shallow    LARYNGEAL PALPATION:     supple musculature    no significant tenderness    VOICE:    Roughness: Mild Intermittent    Breathiness: WNL    Strain: WNL    F3-G3    Loudness    Conversational speech:  WNL    Projected speech:  WNL    Pitch:    Conversational speech:  WNL    Pitch glide: neurologically normal    Resonance:    Conversational speech:  laryngeal pharyngeal resonance    Singing vs. Speech:  n/a    CAPE-V Overall Severity:  15/100    COUGH/THROAT CLEARING:    Occasional    Dry    THERAPY PROBES: Improvement was elicited with use of forward resonant stimuli, coordination of respiration and phonation and use of rescue breathing strategies    LARYNGEAL FUNCTION STUDIES (CPT 03235)  Recommend to be completed when able to come into clinic.    LARYNGEAL EXAMINATION  Recommend to be completed when able to come into clinic.    ASSESSMENT / PLAN  IMPRESSIONS: Obie Woods is a 25 year old adult, presenting today with voice and resonance disorder in the context of gender dysphoria, as well as dysphonia and dyspnea, as evidenced by today's evaluation.        STIMULABILITY: results of therapy probes during perceptual evaluation demonstrate improvement withuse of forward resonant stimuli, coordination of respiration and phonation and use of rescue breathing strategies    ADDITIONAL RECOMMENDATIONS:     A course of speech therapy is recommended to optimize vocal technique, promote reduced discomfort, effort and fatigue and help reduce throat clear, mucosal irritation and Improve respiratory mechanics to resolve symptoms associated with paradoxical vocal fold motion, also known as vocal cord dysfunction.    Obie Woods demonstrates a Good prognosis for improvement given adherence to therapeutic recommendations.     Positive indicators: positive response to therapy probes diagnosis is known to respond to treatment high level of comittment    Negative indicators: none    DURATION / FREQUENCY: 4 biweekly one-hour sessions.  A total of 6-8 sessions may be necessary.    I provided information regarding voice and resonance, irritable larynx syndrome, dysphonia, and vocal cord dysfunction  Apps: voice analyst and perfect piano  E3 was able to achieve  Hum steady pitch and speaking the months of the year    ILS - saline bottle and saline spray.  Dr. Ketan DICKSON - need to do more, but rounded lips was helpful today.     GOALS:  Patient goal:   To improve and maintain a healthy voice quality  To understand the problem and fix it as much as possible  To have a normal and acceptable voice quality  To reduce Obie Woods's throat clearing to acceptable levels  To breathe normally and comfortably in all situations    Long-term goal(s): In 6 months,   Peggy will:  1. Report resolution of symptoms, and use of optimal voice quality and comfort to meet personal, social, and professional needs, 90% of the time during a typical week of vocal activities   2. To breathe comfortably during month of typical daily activities    This treatment plan was developed with the patient who  agreed with the recommendations.    TOTAL SERVICE TIME:   Call Initiated at: 9:03 AM  Call Ended at: 10           CPT Billing Codes:   EVALUATION OF VOICE AND RESONANCE (72424)  NO CHARGE FACILITY FEE (50947)      Earnestine Byrd M.M. (voice), M.A., CCC/SLP  Speech-Language Pathologist  [employer] Trained Vocologist  [facility]  [phone]  [email or web address]  Prounouns: she/her      *this report was created in part through the use of computerized dictation software, and though reviewed following completion, some typographic errors may persist.  If there is confusion regarding any of this notes contents, please contact me for clarification

## 2020-12-23 NOTE — LETTER
Date:January 5, 2021      Patient was self referred, no letter generated. Do not send.        [facility] Information

## 2020-12-23 NOTE — LETTER
12/23/2020       RE: Ramila Woods  [address]   [address]  [place] [address]     Dear Colleague,    Thank you for referring your patient, Ramila Woods, to the [facility] [place] at [facility]. Please see a copy of my visit note below.      Ramila Woods is a 25 year old adult who is being cared for via a billable virtual visit.        The patient has been notified and verbally consented to the following statements:     This video visit will be conducted between you and your provider.    Patient has opted to conduct today's video visit vs an in-person appointment, and is not able to attend due to possible exposure to COVID-19.      If during the course of the call the provider feels a video visit is not appropriate, you will not be charged for this service.    Provider has received verbal consent for billable virtual visit from the patient? Yes    Preferred method for receiving information: email    Call initiated at: 9:03 AM  Platform used to conduct today's virtual appointment: =AM Well video  Location of provider: Residence  Location of patient: [facility]  Dexter Wang Jr., M.D., F.A.C.S.  Jasmyn Cowart M.D., M.P.H.  Paty Macias M.D.  Marli Hook, Ph.D., CCC/SLP  Earnestine Byrd M.M. (voice), M.A., CCC/SLP  Sagar Archibald M.M. (voice), M.A., CCC/SLP       Evaluation report    Clinician: Earnestine Byrd M.M. (voice), M.A., CCC/SLP  Referring physician:  Self  Patient: Ramila Woods  Date of Visit: 12/23/2020    HISTORY  Chief complaint: Obie Woods is a 25 year old presenting today for evaluation of voice and resonance disorder secondary to gender dysphoria  General Hx:  None binary/ Agender  They/ Them pronouns  They knew that they were not a girl their whole life, and 1.5-2 years ago they found that it was ok to not be a girl.  No hormones; would prefer not to take    CURRENT SYMPTOMS  "INCLUDE  VOICE    Voice therapy has never been pursued    Would like a voice that is more androgenous    Vocal deficiency- people found that they has VCD and makes it difficult with athletic, temp (hot cold) and smoke.     THROAT ISSUES     COUGH:  o Mostly a dry cough, which around a long time.     THROAT CLEARING:  o Frequent thorat clearing     GLOBUS:  o Sometimes, not often     SWALLOWING    Swallowing is not an issue    Occasionally it is usually occurs with dry foods.     RESPIRATION    Congestion today    Has had breathing problems all their life.    Friends say that they have weird breathing habits.     Takes a deep breath after holding     Allergic to dust mites and certain grasses.  The dust will sometimes make it hard to breathe, and they are not sure if they have sleep apnea, but one of their roommates said that used to gasp a lot in sleep .    They have not had a formal check of the sinuses.     ADDITIONAL    Reflux: can be a trigger for VCD, but ha snot noticed frequent reflux issues.     No Hx of smoking, but their parents smoked while they were a child.     OTHER PERTINENT HISTORY    Otherwise unremarkable.  Please also refer to their chart for additional history    No past medical history on file.  No past surgical history on file.    OBJECTIVE  PATIENT REPORTED MEASURES  Patient Supplied Answers To VHI Questionnaire  Voice Handicap Index (VHI-10) 12/23/2020   My voice makes it difficult for people to hear me 0   People have difficulty understanding me in a noisy room 0   My voice difficulties restrict my personal and social life.  2   I feel left out of conversations because of my voice 0   My voice problem causes me to lose income 0   I feel as though I have to strain to produce voice 2   The clarity of my voice is unpredictable 2   My voice problem upsets me 3   My voice makes me feel handicapped 2   People ask, \"What's wrong with your voice?\" 1   VHI-10 12       Patient Supplied Answers To CSI " Questionnaire  No flowsheet data found.    Patient Supplied Answers To EAT Questionnaire  No flowsheet data found.    Patient Supplied Answers to Dyspnea Index Questionnaire:  Dyspnea Index 12/23/2020   1. I have trouble getting air in. 4   2. I feel tightness in my throat when I am having cintia breathing problem. 3   3. It takes more effort to breathe than it used to. 4   4. Change in weather affect my breathing problem. 4   5. My breathing gets worse with stress. 2   6. I make sound/noise breathing in 3   7. I have to strain to breathe. 3   8. My shortness of breath gets worse with exercise or physical activity 4   9. My breathing problem makes me feel stressed. 2   10. My breathing problem casuses me to restrict my personal and social life. 1   Dyspnea Index Total Score 30   always has a low grade breathing issue that becomes exasperate by walking or other physical activity, or breathing in steam, etc.       PERCEPTUAL EVALUATION (CPT 17931)  POSTURE / TENSION:     upper body    neck and shoulders    BREATHING:     inspirations are inadequate in volume and frequency    clavicular elevation on inspiration    shoulder and neck involvement    shallow    LARYNGEAL PALPATION:     supple musculature    no significant tenderness    VOICE:    Roughness: Mild Intermittent    Breathiness: WNL    Strain: WNL    F3-G3    Loudness    Conversational speech:  WNL    Projected speech:  WNL    Pitch:    Conversational speech:  WNL    Pitch glide: neurologically normal    Resonance:    Conversational speech:  laryngeal pharyngeal resonance    Singing vs. Speech:  n/a    CAPE-V Overall Severity:  15/100    COUGH/THROAT CLEARING:    Occasional    Dry    THERAPY PROBES: Improvement was elicited with use of forward resonant stimuli, coordination of respiration and phonation and use of rescue breathing strategies    LARYNGEAL FUNCTION STUDIES (CPT 56726)  Recommend to be completed when able to come into clinic.    LARYNGEAL  EXAMINATION  Recommend to be completed when able to come into clinic.    ASSESSMENT / PLAN  IMPRESSIONS: Obie Woods is a 25 year old adult, presenting today with voice and resonance disorder in the context of gender dysphoria, as well as dysphonia and dyspnea, as evidenced by today's evaluation.       STIMULABILITY: results of therapy probes during perceptual evaluation demonstrate improvement withuse of forward resonant stimuli, coordination of respiration and phonation and use of rescue breathing strategies    ADDITIONAL RECOMMENDATIONS:     A course of speech therapy is recommended to optimize vocal technique, promote reduced discomfort, effort and fatigue and help reduce throat clear, mucosal irritation and Improve respiratory mechanics to resolve symptoms associated with paradoxical vocal fold motion, also known as vocal cord dysfunction.    Obie Woods demonstrates a Good prognosis for improvement given adherence to therapeutic recommendations.     Positive indicators: positive response to therapy probes diagnosis is known to respond to treatment high level of comittment    Negative indicators: none    DURATION / FREQUENCY: 4 biweekly one-hour sessions.  A total of 6-8 sessions may be necessary.    I provided information regarding voice and resonance, irritable larynx syndrome, dysphonia, and vocal cord dysfunction  Apps: voice analyst and perfect piano  E3 was able to achieve  Hum steady pitch and speaking the months of the year    ILS - saline bottle and saline spray.  Dr. Ketan DICKSON - need to do more, but rounded lips was helpful today.     GOALS:  Patient goal:   To improve and maintain a healthy voice quality  To understand the problem and fix it as much as possible  To have a normal and acceptable voice quality  To reduce Obie Woods's throat clearing to acceptable levels  To breathe normally and comfortably in all situations    Long-term goal(s): In 6 months,   Peggy will:  1. Report  resolution of symptoms, and use of optimal voice quality and comfort to meet personal, social, and professional needs, 90% of the time during a typical week of vocal activities   2. To breathe comfortably during month of typical daily activities    This treatment plan was developed with the patient who agreed with the recommendations.    TOTAL SERVICE TIME:   Call Initiated at: 9:03 AM  Call Ended at: 10           CPT Billing Codes:   EVALUATION OF VOICE AND RESONANCE (15521)  NO CHARGE FACILITY FEE (24786)      Earnestine Byrd M.M. (voice), M.A., CCC/SLP  Speech-Language Pathologist  [facility] Trained Vocologist  [facility] Voice [facility]  [phone]  [email or web address]  Prounouns: she/her      *this report was created in part through the use of computerized dictation software, and though reviewed following completion, some typographic errors may persist.  If there is confusion regarding any of this notes contents, please contact me for clarification              Again, thank you for allowing me to participate in the care of your patient.      Sincerely,    Earnestine Byrd, SLP

## 2020-12-30 ENCOUNTER — ANCILLARY PROCEDURE (OUTPATIENT)
Dept: MAMMOGRAPHY | Facility: CLINIC | Age: 25
End: 2020-12-30
Attending: PLASTIC SURGERY
Payer: COMMERCIAL

## 2020-12-30 DIAGNOSIS — F64.0 GENDER DYSPHORIA IN ADULT: ICD-10-CM

## 2020-12-30 PROCEDURE — 77067 SCR MAMMO BI INCL CAD: CPT | Performed by: STUDENT IN AN ORGANIZED HEALTH CARE EDUCATION/TRAINING PROGRAM

## 2021-01-12 ENCOUNTER — VIRTUAL VISIT (OUTPATIENT)
Dept: PSYCHOLOGY | Facility: CLINIC | Age: 26
End: 2021-01-12
Payer: COMMERCIAL

## 2021-01-12 DIAGNOSIS — F43.21 ADJUSTMENT DISORDER WITH DEPRESSED MOOD: ICD-10-CM

## 2021-01-12 DIAGNOSIS — F41.1 GENERALIZED ANXIETY DISORDER: ICD-10-CM

## 2021-01-12 DIAGNOSIS — F64.0 GENDER DYSPHORIA IN ADOLESCENT AND ADULT: Primary | ICD-10-CM

## 2021-01-12 PROCEDURE — 90832 PSYTX W PT 30 MINUTES: CPT | Mod: 95 | Performed by: STUDENT IN AN ORGANIZED HEALTH CARE EDUCATION/TRAINING PROGRAM

## 2021-01-12 PROCEDURE — 99207 PR NO BILLABLE SERVICE THIS VISIT: CPT | Performed by: STUDENT IN AN ORGANIZED HEALTH CARE EDUCATION/TRAINING PROGRAM

## 2021-01-12 NOTE — PROGRESS NOTES
[place] [facility] -  Case Progress Note    Date of Service: 21   Name: Ramila Barnes)  : 1995  Medical Record Number: [id number]  Treating Provider: Nabil Bahena, PhD  Type of Session: Individual  Present in Session: Nabil Ragland  Number of Minutes:  31  Video start time: 12:02pm  Video end time: 12:33pm    Telemedicine Visit: The patient's condition can be safely assessed and treated via synchronous audio and visual telemedicine encounter.       Reason for Telemedicine Visit: Services only offered telehealth     Originating Site (Patient Location): Patient's home     Distant Site (Provider Location): Provider Remote Setting     Consent:  The patient/guardian has verbally consented to: the potential risks and benefits of telemedicine (video visit) versus in person care; bill my insurance or make self-payment for services provided; and responsibility for payment of non-covered services.      Mode of Communication:  Video Conference via [employer]. Session began at 12:02pm and ended at 12:33pm without any technical difficulties.     As the provider I attest to compliance with applicable laws and regulations related to telemedicine.    Current Symptoms/Status:  Client has an established history of experiencing gender dysphoria, psychological distress stemming from incongruence with gender identity and sex assigned at birth exacerbated by primary and secondary sex characteristics. Client reported experiencing the above-mentioned symptoms as well as several depressive symptoms, including social isolation, lack of interest, and sadness. Client reports ongoing concerns related to gender dysphoria and navigating interpersonal relationships. Client reported a history of family trauma related to substance use and addiction, difficulties being accepted as non-binary by family, and navigating societal expectations of relationships. Client reported having difficulties with falling and staying asleep,  and experiencing low energy, lethargy, and flattened mood.     Progress Toward Treatment Goals:   Client reported making  progress in their interpersonal relationships but struggling with symptoms of depression. Client expressed commitment to working toward their treatment goals: (1) emotionally work through transition-related issues, (2) improve communication in relationship (open discussion of boundaries, etc.), (3) learn new skills to cope with feelings of gender dysphoria, and (4) work toward building trust in their new relationship.    Intervention: Modality and Description: Client's last session was over a month ago, so this session focused on exploring client's current symptoms. Client reported feeling very tired due to having recurring nightmares and stress dreams that have been impacting their ability to sleep through the night and wake up feeling rested. Client also expressed that not sleeping well has exacerbated their anxiety. Client also shared feeling overwhelmed and worried about their relationship with their mother. Client also reported continuing to experience symptoms of depression (sadness, fatigue, low energy and interest in pleasurable activities, low motivation, sleep disturbance: difficulty stay asleep and waking up several times in the middle of the night). Therapist used emotion-focused strategies and CBT to help client examine how these stressors have been impacting their day to day life. Therapist also provided support and validation and helped client think through self-care strategies and ways to improve sleep hygiene. Toward the end of session, client reported having a top surgery consult last month and  requested a letter for top surgery. Therapist and client will work collaboratively to write the support letter during next session on 1/27/2021. Session ended early due to client feeling exhausted and having difficulty staying awake.    Response to Intervention:  Client was open,  engaged, and receptive to strategies and interventions used in session.  Client stated they would like to work toward noticing their symptoms and communicating them to her psychiatrist. Client was open to developing more self-care strategies to promote their own wellness.     Assignment:  Client will find self-care activities to engage in with partner to reduce stress and improve interpersonal coping skills.    Interactive Complexity:  Not applicable.    DSM-5 Diagnoses:  302.85 (F64.0) Gender Dysphoria in adolescent and adult  300.02 (F41.1) Generalized Anxiety Disorder  309.0 (F43.21) Adjustment Disorder with depressed mood    Plan/Need for Future Services:  Return for therapy in 2 weeks to treat diagnosed problems.        Nabil Bahena, PhD  Postdoctoral Fellow

## 2021-01-15 ENCOUNTER — HEALTH MAINTENANCE LETTER (OUTPATIENT)
Age: 26
End: 2021-01-15

## 2021-01-20 ENCOUNTER — VIRTUAL VISIT (OUTPATIENT)
Dept: FAMILY MEDICINE | Facility: CLINIC | Age: 26
End: 2021-01-20
Payer: COMMERCIAL

## 2021-01-20 DIAGNOSIS — F64.9 GENDER DYSPHORIA: Primary | ICD-10-CM

## 2021-01-20 PROCEDURE — 99203 OFFICE O/P NEW LOW 30 MIN: CPT | Mod: 95 | Performed by: INTERNAL MEDICINE

## 2021-01-20 SDOH — HEALTH STABILITY: MENTAL HEALTH: HOW OFTEN DO YOU HAVE A DRINK CONTAINING ALCOHOL?: NEVER

## 2021-01-20 ASSESSMENT — ANXIETY QUESTIONNAIRES
2. NOT BEING ABLE TO STOP OR CONTROL WORRYING: NOT AT ALL
GAD7 TOTAL SCORE: 4
7. FEELING AFRAID AS IF SOMETHING AWFUL MIGHT HAPPEN: SEVERAL DAYS
1. FEELING NERVOUS, ANXIOUS, OR ON EDGE: SEVERAL DAYS
5. BEING SO RESTLESS THAT IT IS HARD TO SIT STILL: SEVERAL DAYS
6. BECOMING EASILY ANNOYED OR IRRITABLE: NOT AT ALL
IF YOU CHECKED OFF ANY PROBLEMS ON THIS QUESTIONNAIRE, HOW DIFFICULT HAVE THESE PROBLEMS MADE IT FOR YOU TO DO YOUR WORK, TAKE CARE OF THINGS AT HOME, OR GET ALONG WITH OTHER PEOPLE: SOMEWHAT DIFFICULT
3. WORRYING TOO MUCH ABOUT DIFFERENT THINGS: NOT AT ALL

## 2021-01-20 ASSESSMENT — PATIENT HEALTH QUESTIONNAIRE - PHQ9
5. POOR APPETITE OR OVEREATING: SEVERAL DAYS
SUM OF ALL RESPONSES TO PHQ QUESTIONS 1-9: 11

## 2021-01-20 NOTE — PROGRESS NOTES
Obie is a 25 year old who is being evaluated via a billable telephone visit.      What phone number would you like to be contacted at? [phone]  How would you like to obtain your AVS? [place]  Assessment & Plan     Non-binary gender identity    Obie would like to stop menstrual periods due to non-binary gender indentity.  They are not interested in testosterone therapy.  We discussed options as below and they plan to investigate these further and check on insurance coverage      Continuous contraceptive pills - skip the placebo pills and start the next pack right away to skip periods.  Some people have breakthrough bleeding with this, but this is probably the easiest thing to try.    These contraception options stop periods for some people but not all:    Depo-Provera injections (in-office injection every 3 months)    Nexplanon arm implant (in-office procedure)    Hormone IUD device (placed in the uterus during in-office procedure)    More definitive procedures would include endometrial ablation (zapping of the uterus lining) and hysterectomy (surgical removal of the uterus).  These would be performed by gynecology and would be the most invasive and most permanent options.        Syed Flynn MD  [facility]     Obie is a 25 year old who presents to clinic today for the following health issues     HPI     Obie identifies as agender/non-binary and using they/them pronouns    Menstrual Concern  Onset/Duration: would like to stop having periods  Description:   Duration of bleeding episodes: 3-4 days  Frequency of periods: (1st day of one to 1st day of next):  every 30 days  Describe bleeding/flow:   Clots: YES  Number of pads/day: 2        Cramping: moderate and during  Accompanying Signs & Symptoms:  Lightheadedness: YES  Temperature intolerance: YES- doesn't matter if while has menses or not   Nosebleeds/Easy bruising: YES- bruising   Vaginal Discharge: no  Acne: no  Change in  body hair: no  History:  Patient's last menstrual period was 01/08/2021 (within days).  Previous normal periods: YES  Contraceptive use: NO- has not taken anything previously  Sexually active: no.  They are 100% certain they do not want to have children   Any bleeding after intercourse: not applicable  Abnormal PAP Smears: no    They have not had any history of migraines.    They are not interested in being on testosterone.          Review of Systems   Constitutional, endo systems are negative, except as otherwise noted.      Objective           Vitals:  No vitals were obtained today due to virtual visit.    Physical Exam   healthy, alert and no distress  PSYCH: Alert and oriented times 3; coherent speech, normal   rate and volume, able to articulate logical thoughts, able   to abstract reason, no tangential thoughts, no hallucinations   or delusions  Obie S Peggy's affect is normal  RESP: No cough, no audible wheezing, able to talk in full sentences  Remainder of exam unable to be completed due to telephone visits          Phone call duration: 13 minutes

## 2021-01-20 NOTE — PATIENT INSTRUCTIONS
Options for suppressing periods:    Continuous contraceptive pills - skip the placebo pills and start the next pack right away to skip periods.  Some people have breakthrough bleeding with this, but this is probably the easiest thing to try.    These contraception options stop periods for some people but not all:    Depo-Provera injections (in-office injection every 3 months)    Nexplanon arm implant (in-office procedure)    Hormone IUD device (placed in the uterus during in-office procedure)    More definitive procedures would include endometrial ablation (zapping of the uterus lining) and hysterectomy (surgical removal of the uterus).  These would be performed by gynecology and would be the most invasive and most permanent options.

## 2021-01-21 ASSESSMENT — ANXIETY QUESTIONNAIRES: GAD7 TOTAL SCORE: 4

## 2021-02-10 ENCOUNTER — VIRTUAL VISIT (OUTPATIENT)
Dept: PSYCHOLOGY | Facility: CLINIC | Age: 26
End: 2021-02-10
Payer: COMMERCIAL

## 2021-02-10 DIAGNOSIS — F64.0 GENDER DYSPHORIA IN ADOLESCENT AND ADULT: Primary | ICD-10-CM

## 2021-02-10 DIAGNOSIS — F41.1 GENERALIZED ANXIETY DISORDER: ICD-10-CM

## 2021-02-10 DIAGNOSIS — F43.21 ADJUSTMENT DISORDER WITH DEPRESSED MOOD: ICD-10-CM

## 2021-02-10 PROCEDURE — 90834 PSYTX W PT 45 MINUTES: CPT | Mod: U7 | Performed by: STUDENT IN AN ORGANIZED HEALTH CARE EDUCATION/TRAINING PROGRAM

## 2021-02-10 NOTE — PROGRESS NOTES
[place] [facility] -  Case Progress Note    Date of Service: 2/10/21   Name: Ramila Barnes)  : 1995  Medical Record Number: [id number]  Treating Provider: Nabil Bahena, PhD  Type of Session: Individual  Present in Session: Nabil Ragland  Number of Minutes: 45  Video start time: 3:01pm  Video end time: 3:46pm    Telemedicine Visit: The patient's condition can be safely assessed and treated via synchronous audio and visual telemedicine encounter.       Reason for Telemedicine Visit: Services only offered telehealth     Originating Site (Patient Location): Patient's home     Distant Site (Provider Location): Provider Remote Setting     Consent:  The patient/guardian has verbally consented to: the potential risks and benefits of telemedicine (video visit) versus in person care; bill my insurance or make self-payment for services provided; and responsibility for payment of non-covered services.      Mode of Communication:  Video Conference via [employer] without any technical difficulties.     As the provider I attest to compliance with applicable laws and regulations related to telemedicine.    Current Symptoms/Status:  Client has an established history of experiencing gender dysphoria, psychological distress stemming from incongruence with gender identity and sex assigned at birth exacerbated by primary and secondary sex characteristics. Client reported experiencing the above-mentioned symptoms as well as several depressive symptoms, including social isolation, lack of interest, and sadness. Client reports ongoing concerns related to gender dysphoria and navigating interpersonal relationships. Client reported a history of family trauma related to substance use and addiction, difficulties being accepted as non-binary by family, and navigating societal expectations of relationships. Client reported having difficulties with falling and staying asleep, and experiencing low energy, lethargy, and  flattened mood.     Progress Toward Treatment Goals:   Client reported making  progress in their interpersonal relationships but struggling with symptoms of depression. Client expressed commitment to working toward their treatment goals: (1) emotionally work through transition-related issues, (2) improve communication in relationship (open discussion of boundaries, etc.), (3) learn new skills to cope with feelings of gender dysphoria, and (4) work toward building trust in their new relationship.    Intervention: Modality and Description:   Client missed a couple of sessions lately due to scheduling conflicts. Client reported continuing to experience symptoms of depression (sadness, fatigue, low energy and interest in pleasurable activities, low motivation, sleep disturbance: difficulty stay asleep and waking up several times in the middle of the night).Client reported feeling like they are not functioning the way they typically do (e.g., not being able to keep to a regular schedule, feeling tired all of the time, not managing time as effectively as before). Client reported working with their provider to increase medications back in November, but they have not noticed any changes or decreases in their depressive symptoms and anxiety. Client continues to experience stress dreams and increased need for sleep throughout the day even when they get a full 8 to 10 hours of sleep. Therapist used emotion-focused strategies and CBT to help client examine negative thought constellations and self-concept have been impacting their day to day life. Therapist also provided support and validation and helped client think through self-care strategies and ways to improve sleep hygiene. Toward the end of session, client requested to switch to weekly sessions.    Response to Intervention:  Client was open, engaged, and receptive to strategies and interventions used in session.  Client stated they would like to work toward noticing their  symptoms and communicating them to her psychiatrist. Client was open to developing more self-care strategies to promote their own wellness.     Assignment:  - Client will find self-care activities to engage in with partner to reduce stress and improve interpersonal coping skills.  - Client will work on tracking mood daily.    Interactive Complexity:  Not applicable.    DSM-5 Diagnoses:  302.85 (F64.0) Gender Dysphoria in adolescent and adult  300.02 (F41.1) Generalized Anxiety Disorder  309.0 (F43.21) Adjustment Disorder with depressed mood    Plan/Need for Future Services:  Return for therapy in 1 week to treat diagnosed problems.        Nabil Bahena, PhD  Postdoctoral Fellow

## 2021-02-12 ENCOUNTER — VIRTUAL VISIT (OUTPATIENT)
Dept: OTOLARYNGOLOGY | Facility: CLINIC | Age: 26
End: 2021-02-12
Payer: COMMERCIAL

## 2021-02-12 DIAGNOSIS — R49.9 VOICE AND RESONANCE DISORDER: Primary | ICD-10-CM

## 2021-02-12 DIAGNOSIS — J38.7 IRRITABLE LARYNX: ICD-10-CM

## 2021-02-12 DIAGNOSIS — R49.0 DYSPHONIA: ICD-10-CM

## 2021-02-12 DIAGNOSIS — F64.9 GENDER DYSPHORIA: ICD-10-CM

## 2021-02-12 DIAGNOSIS — J38.3 VOCAL CORD DYSFUNCTION: ICD-10-CM

## 2021-02-12 PROCEDURE — 99207 PR NO CHARGE LOS: CPT | Performed by: SPEECH-LANGUAGE PATHOLOGIST

## 2021-02-12 PROCEDURE — 92507 TX SP LANG VOICE COMM INDIV: CPT | Mod: GN | Performed by: SPEECH-LANGUAGE PATHOLOGIST

## 2021-02-12 NOTE — LETTER
"2/12/2021       RE: Ramila Woods  [address]  [address]  [place] [address]     Dear Colleague,    Thank you for referring your patient, Ramila Woods, to the [facility] VOICE CLINIC [place] at [facility]. Please see a copy of my visit note below.    Ramila Woods is a 26 year old adult who is being cared for via a billable virtual visit.        The patient has been notified and verbally consented to the following statements:     This video visit will be conducted between you and your provider.    Patient has opted to conduct today's video visit vs an in-person appointment, and is not able to attend due to possible exposure to COVID-19.      If during the course of the call the provider feels a video visit is not appropriate, you will not be charged for this service.    Provider has received verbal consent for billable virtual visit from the patient? Yes    Preferred method for receiving information: email    Call initiated at: 8:07 AM  Platform used to conduct today's virtual appointment: AM Well Video  Location of provider: Residence  Location of patient: [facility] VOICE [facility]  THERAPY NOTE (CPT 85770)  Patient: Ramila Woods  Date of Service: 2/12/2021  Impressions from most recent evaluation (12/23/20):  \"IMPRESSIONS: Obie Woods is a 25 year old adult, presenting today with voice and resonance disorder in the context of gender dysphoria, as well as dysphonia and dyspnea, as evidenced by today's evaluation.   \"    SUBJECTIVE:  Since the last appointment,   Peggy reports the following:     Overall Obie Woods reports that symptoms are stable    OBJECTIVE:    Obie presents today with the following:  BREATHING:   ? inspirations are inadequate in volume and frequency  ? clavicular elevation on inspiration  ? shoulder and neck involvement  ? shallow     LARYNGEAL PALPATION:   ? supple musculature  ? no significant " "tenderness     VOICE:  ? Roughness: Mild Intermittent  ? Breathiness: WNL  ? Strain: WNL  ? F3-G3  ? Loudness    Conversational speech:  WNL    Projected speech:  WNL  ? Pitch:    Conversational speech:  WNL    Pitch glide: neurologically normal  ? Resonance:    Conversational speech:  laryngeal pharyngeal resonance    PATIENT REPORTED MEASURES:  Patient Supplied Answers To SLP QOL Questionnaire  No flowsheet data found.    THERAPEUTIC ACTIVITIES  Exercises to promote optimal respiratory mechanics    I provided explanation of the anatomy and physiology of respiration for speech and singing; Obie Woods found this to be helpful    Obie Woods demonstrated clavicular/neck/shoulder involvement in inhalation    Demonstrated difficulty allowing abdominal relaxation for inhalation    Practiced in a prone and supine position on the massage table, with tactile cue of a hand on the low rib-cage to facilitate awareness of low respiratory engagement.  Progressed to seated and standing today.    With clinician support, patient was able to demonstrate improved abdominal relaxation and engagement on inhalation    Optimal exhalation using inward engagement of the abdominal wall with no corresponding collapse of the upper chest cavity was trained using the pulsed \"sh\" task    [place] a respiratory pacing exercise; this was helpful    Rescue breathing strategies using oral configurations to promote improved vocal fold abduction were instructed    Patient reported rounded lip breathing was most beneficial    A plan for when and how to implement these strategies was developed, and the patient was encouraged to practice the techniques independent of distress two times daily to habituate their use.    acceptable improvement in airflow and respiratory mechanics      Semi-Occluded Vocal Tract (SOVT) exercises instructed to reduce laryngeal tension, promote vocal fold pliability, and coordinate respiration and phonation    Straw " "phonation with water resistance was found to be most facilitating     Sustained phonation, and voice vs. voiceless productions used to promote easy voicing and raise awareness of laryngeal tension    Ascending and descending glides utilized to promote vocal fold pliability    \"Messa di voce\", gradual crescendo and decrescendo to vary medial compression was also utilized to promote vocal fold pliability.    Instructed on the benefits of using these exercises for improved coordination of breath flow with phonation and tissue mobilization.    Instructed on the importance of using these exercises as a warm-up / cool down,  and to re-calibrate the voice throughout the day.      [place] exercises for upper body relaxation during phonation.  o demonstrated moderate upper body tension  o good self-awareness while completing stretches for the upper body and neck.  o improvement in voice quality during exercises    Counseling and Education:    Asked many questions about the nature of Obie Woods's symptoms, and I answered all of these thoroughly.    A revised regimen for home practice was instructed.    I provided an audio recording and handouts of today's therapeutic activities to facilitate practice.      ASSESSMENT/PLAN  PROGRESS TOWARD LONG TERM GOALS:   Adequate progress; please see above    IMPRESSIONS: voice and resonance disorder in the context of gender dysphoria, as well as dysphonia and dyspnea. Obie demonstrated good overall learning today.      PLAN: I will see Obie in approximately 2 weeks, at which point we will continue therapy.   For practice goals see AVS.     TOTAL SERVICE TIME:   Call Initiated at: 8:07 AM  Call Ended at: 9am           CPT Billing Codes:   TREATMENT (64096)  NO CHARGE FACILITY FEE (19846)    Earnestine Byrd M.M. (voice), M.A., CCC/SLP  Speech-Language Pathologist  [facility] Trained Vocologist  [facility] Voice [facility]  [phone]  [email or web address]  Pronouns: she/her      *this report " was created in part through the use of computerized dictation software, and though reviewed following completion, some typographic errors may persist.  If there is confusion regarding any of this notes contents, please contact me for clarification      Again, thank you for allowing me to participate in the care of your patient.      Sincerely,    Earnestine Byrd, SLP

## 2021-02-12 NOTE — PROGRESS NOTES
"Ramila Woods is a 26 year old adult who is being cared for via a billable virtual visit.        The patient has been notified and verbally consented to the following statements:     This video visit will be conducted between you and your provider.    Patient has opted to conduct today's video visit vs an in-person appointment, and is not able to attend due to possible exposure to COVID-19.      If during the course of the call the provider feels a video visit is not appropriate, you will not be charged for this service.    Provider has received verbal consent for billable virtual visit from the patient? Yes    Preferred method for receiving information: email    Call initiated at: 8:07 AM  Platform used to conduct today's virtual appointment: AM Well Video  Location of provider: Residence  Location of patient: [facility] VOICE CLINIC  THERAPY NOTE (CPT 69994)  Patient: Ramila Woods  Date of Service: 2/12/2021  Impressions from most recent evaluation (12/23/20):  \"IMPRESSIONS: Obie Woods is a 25 year old adult, presenting today with voice and resonance disorder in the context of gender dysphoria, as well as dysphonia and dyspnea, as evidenced by today's evaluation.   \"    SUBJECTIVE:  Since the last appointment,   Peggy reports the following:     Overall Obie Woods reports that symptoms are stable    OBJECTIVE:    Obie presents today with the following:  BREATHING:   ? inspirations are inadequate in volume and frequency  ? clavicular elevation on inspiration  ? shoulder and neck involvement  ? shallow     LARYNGEAL PALPATION:   ? supple musculature  ? no significant tenderness     VOICE:  ? Roughness: Mild Intermittent  ? Breathiness: WNL  ? Strain: WNL  ? F3-G3  ? Loudness    Conversational speech:  WNL    Projected speech:  WNL  ? Pitch:    Conversational speech:  WNL    Pitch glide: neurologically normal  ? Resonance:    Conversational speech:  laryngeal pharyngeal resonance    PATIENT " "REPORTED MEASURES:  Patient Supplied Answers To SLP QOL Questionnaire  No flowsheet data found.    THERAPEUTIC ACTIVITIES  Exercises to promote optimal respiratory mechanics    I provided explanation of the anatomy and physiology of respiration for speech and singing; Obie JAMES Peggy found this to be helpful    Obie JAMES Peggy demonstrated clavicular/neck/shoulder involvement in inhalation    Demonstrated difficulty allowing abdominal relaxation for inhalation    Practiced in a prone and supine position on the massage table, with tactile cue of a hand on the low rib-cage to facilitate awareness of low respiratory engagement.  Progressed to seated and standing today.    With clinician support, patient was able to demonstrate improved abdominal relaxation and engagement on inhalation    Optimal exhalation using inward engagement of the abdominal wall with no corresponding collapse of the upper chest cavity was trained using the pulsed \"sh\" task    [place] a respiratory pacing exercise; this was helpful    Rescue breathing strategies using oral configurations to promote improved vocal fold abduction were instructed    Patient reported rounded lip breathing was most beneficial    A plan for when and how to implement these strategies was developed, and the patient was encouraged to practice the techniques independent of distress two times daily to habituate their use.    acceptable improvement in airflow and respiratory mechanics      Semi-Occluded Vocal Tract (SOVT) exercises instructed to reduce laryngeal tension, promote vocal fold pliability, and coordinate respiration and phonation    Straw phonation with water resistance was found to be most facilitating     Sustained phonation, and voice vs. voiceless productions used to promote easy voicing and raise awareness of laryngeal tension    Ascending and descending glides utilized to promote vocal fold pliability    \"Messa di voce\", gradual crescendo and decrescendo to vary " medial compression was also utilized to promote vocal fold pliability.    Instructed on the benefits of using these exercises for improved coordination of breath flow with phonation and tissue mobilization.    Instructed on the importance of using these exercises as a warm-up / cool down,  and to re-calibrate the voice throughout the day.      [place] exercises for upper body relaxation during phonation.  o demonstrated moderate upper body tension  o good self-awareness while completing stretches for the upper body and neck.  o improvement in voice quality during exercises    Counseling and Education:    Asked many questions about the nature of Obie Woods's symptoms, and I answered all of these thoroughly.    A revised regimen for home practice was instructed.    I provided an audio recording and handouts of today's therapeutic activities to facilitate practice.      ASSESSMENT/PLAN  PROGRESS TOWARD LONG TERM GOALS:   Adequate progress; please see above    IMPRESSIONS: voice and resonance disorder in the context of gender dysphoria, as well as dysphonia and dyspnea. Obie demonstrated good overall learning today.      PLAN: I will see Obie in approximately 2 weeks, at which point we will continue therapy.   For practice goals see AVS.     TOTAL SERVICE TIME:   Call Initiated at: 8:07 AM  Call Ended at: 9am           CPT Billing Codes:   TREATMENT (98401)  NO CHARGE FACILITY FEE (23182)    Earnestine Byrd M.M. (voice) MLIU., CCC/SLP  Speech-Language Pathologist  [facility] Trained Vocologist  [facility]  [phone]  [email or web address]  Pronouns: she/her      *this report was created in part through the use of computerized dictation software, and though reviewed following completion, some typographic errors may persist.  If there is confusion regarding any of this notes contents, please contact me for clarification

## 2021-02-23 ENCOUNTER — VIRTUAL VISIT (OUTPATIENT)
Dept: OTOLARYNGOLOGY | Facility: CLINIC | Age: 26
End: 2021-02-23
Payer: COMMERCIAL

## 2021-02-23 DIAGNOSIS — R49.9 VOICE AND RESONANCE DISORDER: Primary | ICD-10-CM

## 2021-02-23 DIAGNOSIS — J38.7 IRRITABLE LARYNX: ICD-10-CM

## 2021-02-23 DIAGNOSIS — F64.9 GENDER DYSPHORIA: ICD-10-CM

## 2021-02-23 DIAGNOSIS — J38.3 VOCAL CORD DYSFUNCTION: ICD-10-CM

## 2021-02-23 DIAGNOSIS — R49.0 DYSPHONIA: ICD-10-CM

## 2021-02-23 PROCEDURE — 99207 PR NO CHARGE LOS: CPT | Performed by: SPEECH-LANGUAGE PATHOLOGIST

## 2021-02-23 PROCEDURE — 92507 TX SP LANG VOICE COMM INDIV: CPT | Mod: GN | Performed by: SPEECH-LANGUAGE PATHOLOGIST

## 2021-02-23 NOTE — LETTER
"2/23/2021       RE: Ramila Woods  [address]  [address]  [place] [address]     Dear Colleague,    Thank you for referring your patient, Ramila Woods, to the [facility] VOICE CLINIC [place] at [facility]. Please see a copy of my visit note below.    Ramila Woods is a 26 year old adult who is being cared for via a billable virtual visit.          The patient has been notified and verbally consented to the following statements:     This video visit will be conducted between you and your provider.    Patient has opted to conduct today's video visit vs an in-person appointment, and is not able to attend due to possible exposure to COVID-19.      If during the course of the call the provider feels a video visit is not appropriate, you will not be charged for this service.     Provider has received verbal consent for billable virtual visit from the patient? Yes     Preferred method for receiving information: email    Call initiated at: 9 AM  Platform used to conduct today's virtual appointment: AM Well Video  Location of provider: Residence  Location of patient: [facility] VOICE [facility]  THERAPY NOTE (CPT 33193)  Patient: Obie Woods  Date of Service: 2/23/2021  Impressions from most recent evaluation (12/23/20):  \"IMPRESSIONS: Obie Woods is a 25 year old adult, presenting today with voice and resonance disorder in the context of gender dysphoria, as well as dysphonia and dyspnea, as evidenced by today's evaluation.   \"    SUBJECTIVE:  Since the last appointment,   Peggy reports the following:     Overall Obie Woods reports that symptoms are slowly improving with the therapeutic techniques provided      OBJECTIVE:    Peggy presents today with the following:  VOICE:  ? Roughness: Mild Intermittent  ? Breathiness: WNL  ? Strain: WNL  ? F3-G3  ? Loudness    Conversational speech:  WNL    Projected " "speech:  WNL  ? Pitch:    Conversational speech:  WNL    Pitch glide: neurologically normal  ? Resonance:    Conversational speech:  laryngeal pharyngeal resonance    PATIENT REPORTED MEASURES:  Patient Supplied Answers To SLP QOL Questionnaire  No flowsheet data found.    THERAPEUTIC ACTIVITIES    Demonstrated previous exercises.  o demonstrated improved technique  o appropriate redirection provided  o instruction provided for increased level of complexity/difficulty    Exercises to promote optimal respiratory mechanics    I provided explanation of the anatomy and physiology of respiration for speech and singing; Obie Woods found this to be helpful    Demonstrated difficulty allowing abdominal relaxation for inhalation    Practiced in a seated and forward leaning seated posture, with tactile cue of a hand on the low rib-cage to facilitate awareness of low respiratory engagement.      With clinician support, patient was able to demonstrate improved abdominal relaxation and engagement on inhalation    Optimal exhalation using inward engagement of the abdominal wall with no corresponding collapse of the upper chest cavity was trained using the pulsed \"sh\" task    acceptable improvement in airflow and respiratory mechanics    Semi-Occluded Vocal Tract (SOVT) exercises instructed to reduce laryngeal tension, promote vocal fold pliability, and coordinate respiration and phonation    Straw phonation with water resistance was found to be most facilitating     Sustained phonation, and voice vs. voiceless productions used to promote easy voicing and raise awareness of laryngeal tension    Ascending and descending glides utilized to promote vocal fold pliability    \"Messa di voce\", gradual crescendo and decrescendo to vary medial compression was also utilized to promote vocal fold pliability.    Instructed on the benefits of using these exercises for improved coordination of breath flow with phonation and tissue " mobilization.    Instructed on the importance of using these exercises as a warm-up / cool down,  and to re-calibrate the voice throughout the day.    Exercises in techniques for improved airflow during phonation    Speech material with /ju/ glides was facilitating at the word level.    Progressed to easy onset/ flow, and blending phrases    Instructed with a descending 5th, as well as an arpeggio pattern; this was helpful.    [place] techniques to reduce glottal nielson and improve breath flow; negative practice improved awareness today.    Counseling and Education:    Asked many questions about the nature of Obie Woods's symptoms, and I answered all of these thoroughly.    A revised regimen for home practice was instructed.    I provided an AVS and handouts of today's therapeutic activities to facilitate practice.    ASSESSMENT/PLAN  PROGRESS TOWARD LONG TERM GOALS:   Adequate progress; too early for objective measures    IMPRESSIONS: voice and resonance disorder in the context of gender dysphoria and symptoms of vocal cord dysfunction.     PLAN: I will see Obie in 2 weeks, at which point we will continue therapy.   For practice goals see AVS.     TOTAL SERVICE TIME:   Call Initiated at: 9am  Call Ended at: 10am           CPT Billing Codes:   TREATMENT (63837)  NO CHARGE FACILITY FEE (04790)    Earnestine Byrd M.M. (voice), MYaneA., CCC/SLP  Speech-Language Pathologist  [facility] Trained Vocologist  [facility] Voice [facility]  [phone]  [email or web address]  Pronouns: she/her      *this report was created in part through the use of computerized dictation software, and though reviewed following completion, some typographic errors may persist.  If there is confusion regarding any of this notes contents, please contact me for clarification      Again, thank you for allowing me to participate in the care of your patient.      Sincerely,    Earnestine Byrd, SLP

## 2021-02-23 NOTE — PATIENT INSTRUCTIONS
"After Visit Summary    Patient: Obie Woods  Date of Visit: 2021    Today' s Plan:    Breathing:  o Instructed from floor to seated to standing  o Graduated countin, 1-2, 1-2-3 and up to 10  o Excuse me miss phrases  o   o Bubbles  - Blowing bubbles no voice  - Adding a voice: sustained pitch 3x,  and glides (low to high to low) 3x  - Try alternating bubbles with the \"excuse me\" phrases up to the phrase that ends with \"gift registry\"    - Stretches: all 3 1-2x/day    -+   Dexter Wang Jr., M.EVE Cowart M.D.              Marli Hook, Ph.D, CCC-SLP          Earnestine Byrd M.M., M.A. CCC-SLP       Austyn Archibald M.M, M.A. CCC-SLP                           Stretches    1st Stretch:  Shoulder Shrug   1) Raise your shoulders up as high as you can.  2) Inhale, holding it in for 10 seconds.  3) With shoulders raised, exhale.   4) Repeat: inhale and hold it for 10 seconds and then exhale.   5) Relax and roll the shoulders back and down, sitting up straight.     6) Inhale one more time for 10 seconds and exhale.   7) Do these stretches    per day.    2nd Stretch:  [place] Neck Stretch    1) Inhale and exhale bringing chin to chest.  2) Slowly roll to the right side.   3) Take left arm and place behind your back.  4) Relax your shoulder down feeling a stretch in your neck.  5) Inhale, feeling an added pull stretch in your neck and exhale on a  [facility] , repeat.  6) Bring your nose to your shoulder; right ear by right shoulder, not moving anything else.  7) Inhale and exhale on a  shhh .  8) Release arm and bring chin to chest.  9) Slowly roll your neck to the left side.   10) Take right arm and place behind your back.   11) Inhale feeling an added pull stretch in your neck and exhale on a  shhhh , repeat.  12) Again, bring your nose to your shoulder, left ear by left shoulder.  13) Inhale and exhale on a  shhh .  Release arm and bring your chin back to " your chest.   15) Sit up really nice and tall, bringing your chin as close to your chest as you can.   16) Inhale and exhale with a  shhh , repeat.   17) Slowly roll head back up.  18) Do these stretches    per day.    Places we feel stretched:   Neck   Upper back   Sternocleidomastoid Muscle: assists in breathing; lifts rib cage.     [place] Muscle: assists in breathing.    3rd Stretch:  Rib Cage Stretch   1) Clasp hands and bring them up over your head.  2) Lean forward feeling a pull.  3) Inhale and lean off to right side.  4) Exhale on a  shhh .  5) Repeat, inhale and exhale.    6) Roll to the other side.  7) Inhale and exhale on a  shhhh , repeat.  8) Sit up straight and release.  9) Do these stretches    per day.    Places we feel stretched:   Rib Cage Muscles   Upper Back   Lower Back    Side Muscles    Earnestine Byrd M.M. (voice), M.A., CCC/SLP  Speech-Language Pathologist  Certificate of Vocology  [facility]  [phone]  [email or web address]  Pronouns: she/her

## 2021-02-23 NOTE — PATIENT INSTRUCTIONS
"After Visit Summary    Patient: Obie Woods  Date of Visit: 2/23/2021    Today' s Plan:    Breathing:  o Take 2 to 3 breaths in and out (\"shhhhh\"), using your arms behind your back to help anchor the shoulders and your chest.  Allow the breath to get low in your body so that you are using the full length of your lungs to support your voice when you transition to speech.    Progress to the \"bubbles\" exercise:  Voice (2-3x/day unless otherwise noted):    Semi-occluded vocal tract exercise:   o Bubbles (straw in 1 to 1.5  of water) 2-3x/day for 30-60 seconds:  o 3x: blow 10-15 seconds with no voice and keep bubbles consistent.  o 3x: blow bubbles and add a sustained  who  or an  oo  (comfortable pitch )  o 3x: blow bubbles and vary  who  gliding up and down             Up and down like a sine wave  o 3x: blow bubbles on a sustained/ varied pitch soft to loud to soft (messa di voce)    o 1-2x: Happy birthday bubbles (keep connected)  These exercises are great for:    *Instructed on the importance of using these exercises as a warm-up / cool down,  and to re-calibrate the voice throughout the day.    *tissue mobilization exercise - Improving the condition and pliability of the vocal folds.    *Abdominal breathing and applying optimal breath flow to speech/singing.     To improve coordination between breath flow and sound production:     u  words (2-3 x per day)  o 5 words: Few, cue, new, pew, chew  o Breathe first and add a  yawn & sigh  shape to sound     Spacious speech phrases  (2-3x per day)  o Second column - H+ vowel combinations   o First column: (Make sure to take a breath before speaking each phrase, and remember that it does not need to be a large breath) complete \"hi there\" to \"hello-hello-hello\"    Blending Phrases  (2-3x per day)  o Complete one of the longer columns at the bottom of the page    Thinking about carrying over the exercises and activities into regular speech, let us begin by working with " "phrases that you read:    Instructions for Life  o Start with short phrases and gradually progress to longer phrases  o Add the \"schwa\" (uh sound) at the ends, then try to make it silent to allow your breath to release without a gravelly quality at the very end.       Earnestine Byrd M.M. (voice), M.A., CCC/SLP  Speech-Language Pathologist  Certificate of Vocology  [facility]  [phone]  [email or web address]  Pronouns: she/her    "

## 2021-03-05 NOTE — PROGRESS NOTES
"Ramila Woods is a 26 year old adult who is being cared for via a billable virtual visit.          The patient has been notified and verbally consented to the following statements:     This video visit will be conducted between you and your provider.    Patient has opted to conduct today's video visit vs an in-person appointment, and is not able to attend due to possible exposure to COVID-19.      If during the course of the call the provider feels a video visit is not appropriate, you will not be charged for this service.     Provider has received verbal consent for billable virtual visit from the patient? Yes     Preferred method for receiving information: email    Call initiated at: 9 AM  Platform used to conduct today's virtual appointment: AM Well Video  Location of provider: Residence  Location of patient: [facility]  THERAPY NOTE (CPT 12071)  Patient: Obie Woods  Date of Service: 2/23/2021  Impressions from most recent evaluation (12/23/20):  \"IMPRESSIONS: Obie Woods is a 25 year old adult, presenting today with voice and resonance disorder in the context of gender dysphoria, as well as dysphonia and dyspnea, as evidenced by today's evaluation.   \"    SUBJECTIVE:  Since the last appointment,   Peggy reports the following:     Overall Obie Woods reports that symptoms are slowly improving with the therapeutic techniques provided      OBJECTIVE:    Peggy presents today with the following:  VOICE:  ? Roughness: Mild Intermittent  ? Breathiness: WNL  ? Strain: WNL  ? F3-G3  ? Loudness    Conversational speech:  WNL    Projected speech:  WNL  ? Pitch:    Conversational speech:  WNL    Pitch glide: neurologically normal  ? Resonance:    Conversational speech:  laryngeal pharyngeal resonance    PATIENT REPORTED MEASURES:  Patient Supplied Answers To SLP QOL Questionnaire  No flowsheet data found.    THERAPEUTIC ACTIVITIES    Demonstrated previous " "exercises.  o demonstrated improved technique  o appropriate redirection provided  o instruction provided for increased level of complexity/difficulty    Exercises to promote optimal respiratory mechanics    I provided explanation of the anatomy and physiology of respiration for speech and singing; Obie Woods found this to be helpful    Demonstrated difficulty allowing abdominal relaxation for inhalation    Practiced in a seated and forward leaning seated posture, with tactile cue of a hand on the low rib-cage to facilitate awareness of low respiratory engagement.      With clinician support, patient was able to demonstrate improved abdominal relaxation and engagement on inhalation    Optimal exhalation using inward engagement of the abdominal wall with no corresponding collapse of the upper chest cavity was trained using the pulsed \"sh\" task    acceptable improvement in airflow and respiratory mechanics    Semi-Occluded Vocal Tract (SOVT) exercises instructed to reduce laryngeal tension, promote vocal fold pliability, and coordinate respiration and phonation    Straw phonation with water resistance was found to be most facilitating     Sustained phonation, and voice vs. voiceless productions used to promote easy voicing and raise awareness of laryngeal tension    Ascending and descending glides utilized to promote vocal fold pliability    \"Messa di voce\", gradual crescendo and decrescendo to vary medial compression was also utilized to promote vocal fold pliability.    Instructed on the benefits of using these exercises for improved coordination of breath flow with phonation and tissue mobilization.    Instructed on the importance of using these exercises as a warm-up / cool down,  and to re-calibrate the voice throughout the day.    Exercises in techniques for improved airflow during phonation    Speech material with /ju/ glides was facilitating at the word level.    Progressed to easy onset/ flow, and blending " phrases    Instructed with a descending 5th, as well as an arpeggio pattern; this was helpful.    Darden techniques to reduce glottal nielson and improve breath flow; negative practice improved awareness today.    Counseling and Education:    Asked many questions about the nature of Obie Woods's symptoms, and I answered all of these thoroughly.    A revised regimen for home practice was instructed.    I provided an AVS and handouts of today's therapeutic activities to facilitate practice.    ASSESSMENT/PLAN  PROGRESS TOWARD LONG TERM GOALS:   Adequate progress; too early for objective measures    IMPRESSIONS: voice and resonance disorder in the context of gender dysphoria and symptoms of vocal cord dysfunction.     PLAN: I will see Obie in 2 weeks, at which point we will continue therapy.   For practice goals see AVS.     TOTAL SERVICE TIME:   Call Initiated at: 9am  Call Ended at: 10am           CPT Billing Codes:   TREATMENT (29691)  NO CHARGE FACILITY FEE (04933)    Earnestine Byrd M.M. (voice), MYaneA., CCC/SLP  Speech-Language Pathologist  [facility] Trained Vocologist  [facility]  [phone]  [email or web address]  Pronouns: she/her      *this report was created in part through the use of computerized dictation software, and though reviewed following completion, some typographic errors may persist.  If there is confusion regarding any of this notes contents, please contact me for clarification

## 2021-03-09 ENCOUNTER — VIRTUAL VISIT (OUTPATIENT)
Dept: OTOLARYNGOLOGY | Facility: CLINIC | Age: 26
End: 2021-03-09
Payer: COMMERCIAL

## 2021-03-09 DIAGNOSIS — J38.3 VOCAL CORD DYSFUNCTION: ICD-10-CM

## 2021-03-09 DIAGNOSIS — R49.9 VOICE AND RESONANCE DISORDER: ICD-10-CM

## 2021-03-09 DIAGNOSIS — R49.0 DYSPHONIA: Primary | ICD-10-CM

## 2021-03-09 DIAGNOSIS — J38.7 IRRITABLE LARYNX: ICD-10-CM

## 2021-03-09 DIAGNOSIS — F64.9 GENDER DYSPHORIA: ICD-10-CM

## 2021-03-09 PROCEDURE — 92507 TX SP LANG VOICE COMM INDIV: CPT | Mod: GN | Performed by: SPEECH-LANGUAGE PATHOLOGIST

## 2021-03-09 PROCEDURE — 99207 PR NO CHARGE LOS: CPT | Performed by: SPEECH-LANGUAGE PATHOLOGIST

## 2021-03-09 NOTE — PATIENT INSTRUCTIONS
"After Visit Summary    Patient: Obie Woods  Date of Visit: 3/9/2021    Today' s Plan:    Humming SOVT    M+vowels  (Mooo+vowel)    M phraeses -     Throat irritation, Throat clearing suppression:      Keep in mind that it is important to use the strategies to provide preemptive, active, and recovery for your cough symptoms.    We want to reduce the frequency, duration, and severity of your cough.      Sip of water     Gargle (morning, evening, and after each meal)  o With a voice  o Tilt your head side to side  o [place] chirp -  oneilkaaa kakakaaa     Swallow    Hum + swallow    Breathe in through rounded lips + out with a repeated  sh   + swallow    Suck on a lozenge with Pectin (avoid mint or menthol) or a sugar-free candy, gum, \"wet\" snacks (apples, pineapple, grapes, etc).  Also consider Xylitol products, like the Spry brand of lozenges, gum, spray    Puppy Sniffs - 2-3 small quick sniffs through the nose and exhale with  sh     Massage + pull down along the front of the neck and swallow    Wait \"urge surf\"    To improve resonance/ forward focus    M+ vowels     m  sentences    Find link to laugh on you tube.  \"Think back in the mouth\" (by your uvula)  Https://www.youtube.com/watch?v=0UIpC0veLUw  Https://www.youtube.com/watch?v=enJNZ8g_k84    Try to pace your breath in and out, rather than taking big, gulping breaths    Earnestine Byrd M.M. (voice) MYaneA., CCC/SLP  Speech-Language Pathologist  Certificate of Vocology  [facility]  [phone]  [email or web address]  Pronouns: she/her    "

## 2021-03-09 NOTE — LETTER
"3/9/2021       RE: Ramila Woods  [address]  [address]  [place] [address]     Dear Colleague,    Thank you for referring your patient, Ramila Woods, to the [facility] [place] [facility]. Please see a copy of my visit note below.    Ramila Woods is a 26 year old adult who is being cared for via a billable virtual visit.        The patient has been notified and verbally consented to the following statements:     This video visit will be conducted between you and your provider.    Patient has opted to conduct today's video visit vs an in-person appointment, and is not able to attend due to possible exposure to COVID-19.      If during the course of the call the provider feels a video visit is not appropriate, you will not be charged for this service.    Provider has received verbal consent for billable virtual visit from the patient? Yes    Preferred method for receiving information: email    Call initiated at: 9 AM  Platform used to conduct today's virtual appointment: AM Well Video  Location of provider: Residence  Location of patient: [facility] VO[facility]  THERAPY NOTE (CPT 10157)  Patient: Ramila Woods  Date of Service: 3/9/2021  Impressions from most recent evaluation (12/23/20):  \"IMPRESSIONS: Obie Woods is a 25 year old adult, presenting today with voice and resonance disorder in the context of gender dysphoria, as well as dysphonia and dyspnea, as evidenced by today's evaluation.   \"    SUBJECTIVE:  Since the last appointment,   Peggy reports the following:     Overall Obie Woods reports that symptoms are improving    Breathing - easier to breath with abdominal muscles.     Voice exercises - difficult to maintain consistent practice.     Identity - 7-8/10 Neutral  Voice.     Quality: 6-7/10    Cough 2-3/10    Voice bother - depends on  -wish natural voice was notso high pitched.  Characteristics " "bother more.     Breathings is much better than it was.       OBJECTIVE:    Peggy presents today with the following:  Voice quality:    Cough - sounds like a barking seal cough - more masculine.     Laughter - can become too high in pitch    PATIENT REPORTED MEASURES:  Speech follow up as discussed with patient:  Dysponia SLP Goals 3/9/2021   How would you rate your speaking voice quality, if 0 is worst voice quality, and 10 is best voice? 6   How much effort is it to speak, if 0 is no extra effort and 10 is maximum effort? 1   How well does your voice align with your identity, where 0 is \"my voice doesn't align at all\", and 10 is \"my voice aligns completely\"? 7   How severe is your cough /throat clearing, if 0 is no cough at all and 10 is the worst cough? 2   How much does your voice problem bother you? Somewhat   How much does your cough/throat-clearing problem bother you?            A little bit   How much does your breathing problem bother you?         Somewhat   How much does your throat discomfort bother you?     A little bit         THERAPEUTIC ACTIVITIES    Demonstrated previous exercises.  o demonstrated improved technique  o appropriate redirection provided  o instruction provided for increased level of complexity/difficulty    Chronic cough / throat clearing reduction therapy    Obie Woods is most bothered by: cough    Modified Suppression and substitution strategies were instructed including    Swallowing substitution techniques    Breathing suppression techniques to reduce laryngeal tension    Low impact glottic coup and soft cough    Techniques to raise awareness of habitual throat clearing    Semi-Occluded Vocal Tract (SOVT) exercises instructed to reduce laryngeal tension, promote vocal fold pliability, and coordinate respiration and phonation    humming was found to be most facilitating     Sustained phonation, and voice vs. voiceless productions used to promote easy voicing and raise awareness " "of laryngeal tension    Ascending and descending glides utilized to promote vocal fold pliability    \"Messa di voce\", gradual crescendo and decrescendo to vary medial compression was also utilized to promote vocal fold pliability.    Instructed on the benefits of using these exercises for improved coordination of breath flow with phonation and tissue mobilization.    Instructed on the importance of using these exercises as a warm-up / cool down,  and to re-calibrate the voice throughout the day.    Resonant Voice Therapy (RVT) exercises to promote forward locus of resonance and optimized pattern of laryngeal adduction    Speech material that elicits a high, forward tongue position (/n/) was most facilitating    Easy descending glide on /m/ utilized in conjunction with relaxed jaw, tongue, and lightly closed lips to facilitate forward resonant sound    Use of the carrier phrase \"mmhmm\" instructed to promote generalization to everyday speech    Syllable level using /m/ in alternation with cardinal vowels on sustained pitches and speech inflection    Word level exercises featuring nasal continuant loaded stimuli    Phrase level exercises featuring nasal continuants in more complex phonemic contexts were employed    Instructed with and interval of a descending 5th, as well as an arpeggio pattern was facilitating, prior to progressing to comfortable speech using optimal breath flow.    Able to recognize improvement in quality and comfort    Counseling and Education:    Asked many questions about the nature of Obie Woods's symptoms, and I answered all of these thoroughly.    A revised regimen for home practice was instructed.    I provided an AVS and handouts of today's therapeutic activities to facilitate practice.      ASSESSMENT/PLAN  PROGRESS TOWARD LONG TERM GOALS:   Adequate progress; please see above    IMPRESSIONS: voice and resonance disorder in the context of gender dysphoria and symptoms of vocal cord " dysfunction.     PLAN: I will see   Peggy in 3-4 weeks, at which point we will continue therapy.   For practice goals see AVS.     TOTAL SERVICE TIME:   Call Initiated at: 9 AM  Call Ended at: 10am           CPT Billing Codes:   TREATMENT (01304)  NO CHARGE FACILITY FEE (41011)    Earnestine Byrd M.M. (voice), M.A., CCC/SLP  Speech-Language Pathologist  [facility] Trained Vocologist  [facility]  [phone]  [email or web address]  Pronouns: she/her      *this report was created in part through the use of computerized dictation software, and though reviewed following completion, some typographic errors may persist.  If there is confusion regarding any of this notes contents, please contact me for clarification        Again, thank you for allowing me to participate in the care of your patient.      Sincerely,    Earnestine Byrd, SLP

## 2021-03-09 NOTE — PROGRESS NOTES
"Ramila Woods is a 26 year old adult who is being cared for via a billable virtual visit.        The patient has been notified and verbally consented to the following statements:     This video visit will be conducted between you and your provider.    Patient has opted to conduct today's video visit vs an in-person appointment, and is not able to attend due to possible exposure to COVID-19.      If during the course of the call the provider feels a video visit is not appropriate, you will not be charged for this service.    Provider has received verbal consent for billable virtual visit from the patient? Yes    Preferred method for receiving information: email    Call initiated at: 9 AM  Platform used to conduct today's virtual appointment: AM Well Video  Location of provider: Residence  Location of patient: [facility]  THERAPY NOTE (CPT 14567)  Patient: Ramila Woods  Date of Service: 3/9/2021  Impressions from most recent evaluation (12/23/20):  \"IMPRESSIONS: Obie Woods is a 25 year old adult, presenting today with voice and resonance disorder in the context of gender dysphoria, as well as dysphonia and dyspnea, as evidenced by today's evaluation.   \"    SUBJECTIVE:  Since the last appointment,   Peggy reports the following:     Overall Obie Woods reports that symptoms are improving    Breathing - easier to breath with abdominal muscles.     Voice exercises - difficult to maintain consistent practice.     Identity - 7-8/10 Neutral  Voice.     Quality: 6-7/10    Cough 2-3/10    Voice bother - depends on  -wish natural voice was notso high pitched.  Characteristics bother more.     Breathings is much better than it was.       OBJECTIVE:    Peggy presents today with the following:  Voice quality:    Cough - sounds like a barking seal cough - more masculine.     Laughter - can become too high in pitch    PATIENT REPORTED MEASURES:  Speech follow up as discussed with " "patient:  Dysponia SLP Goals 3/9/2021   How would you rate your speaking voice quality, if 0 is worst voice quality, and 10 is best voice? 6   How much effort is it to speak, if 0 is no extra effort and 10 is maximum effort? 1   How well does your voice align with your identity, where 0 is \"my voice doesn't align at all\", and 10 is \"my voice aligns completely\"? 7   How severe is your cough /throat clearing, if 0 is no cough at all and 10 is the worst cough? 2   How much does your voice problem bother you? Somewhat   How much does your cough/throat-clearing problem bother you?            A little bit   How much does your breathing problem bother you?         Somewhat   How much does your throat discomfort bother you?     A little bit         THERAPEUTIC ACTIVITIES    Demonstrated previous exercises.  o demonstrated improved technique  o appropriate redirection provided  o instruction provided for increased level of complexity/difficulty    Chronic cough / throat clearing reduction therapy    Obie Woods is most bothered by: cough    Modified Suppression and substitution strategies were instructed including    Swallowing substitution techniques    Breathing suppression techniques to reduce laryngeal tension    Low impact glottic coup and soft cough    Techniques to raise awareness of habitual throat clearing    Semi-Occluded Vocal Tract (SOVT) exercises instructed to reduce laryngeal tension, promote vocal fold pliability, and coordinate respiration and phonation    humming was found to be most facilitating     Sustained phonation, and voice vs. voiceless productions used to promote easy voicing and raise awareness of laryngeal tension    Ascending and descending glides utilized to promote vocal fold pliability    \"Messa di voce\", gradual crescendo and decrescendo to vary medial compression was also utilized to promote vocal fold pliability.    Instructed on the benefits of using these exercises for improved " "coordination of breath flow with phonation and tissue mobilization.    Instructed on the importance of using these exercises as a warm-up / cool down,  and to re-calibrate the voice throughout the day.    Resonant Voice Therapy (RVT) exercises to promote forward locus of resonance and optimized pattern of laryngeal adduction    Speech material that elicits a high, forward tongue position (/n/) was most facilitating    Easy descending glide on /m/ utilized in conjunction with relaxed jaw, tongue, and lightly closed lips to facilitate forward resonant sound    Use of the carrier phrase \"mmhmm\" instructed to promote generalization to everyday speech    Syllable level using /m/ in alternation with cardinal vowels on sustained pitches and speech inflection    Word level exercises featuring nasal continuant loaded stimuli    Phrase level exercises featuring nasal continuants in more complex phonemic contexts were employed    Instructed with and interval of a descending 5th, as well as an arpeggio pattern was facilitating, prior to progressing to comfortable speech using optimal breath flow.    Able to recognize improvement in quality and comfort    Counseling and Education:    Asked many questions about the nature of Obie Woods's symptoms, and I answered all of these thoroughly.    A revised regimen for home practice was instructed.    I provided an AVS and handouts of today's therapeutic activities to facilitate practice.      ASSESSMENT/PLAN  PROGRESS TOWARD LONG TERM GOALS:   Adequate progress; please see above    IMPRESSIONS: voice and resonance disorder in the context of gender dysphoria and symptoms of vocal cord dysfunction.     PLAN: I will see   Peggy in 3-4 weeks, at which point we will continue therapy.   For practice goals see AVS.     TOTAL SERVICE TIME:   Call Initiated at: 9 AM  Call Ended at: 10am           CPT Billing Codes:   TREATMENT (70125)  NO CHARGE FACILITY FEE (57437)    Earnestine Byrd M.M. " (voice), M.A., CCC/SLP  Speech-Language Pathologist  [facility] Trained Vocologist  [facility]  [phone]  [email or web address]  Pronouns: she/her      *this report was created in part through the use of computerized dictation software, and though reviewed following completion, some typographic errors may persist.  If there is confusion regarding any of this notes contents, please contact me for clarification

## 2021-03-23 ENCOUNTER — VIRTUAL VISIT (OUTPATIENT)
Dept: OTOLARYNGOLOGY | Facility: CLINIC | Age: 26
End: 2021-03-23
Payer: COMMERCIAL

## 2021-03-23 DIAGNOSIS — R49.0 DYSPHONIA: Primary | ICD-10-CM

## 2021-03-23 DIAGNOSIS — J38.7 IRRITABLE LARYNX: ICD-10-CM

## 2021-03-23 DIAGNOSIS — J38.3 VOCAL CORD DYSFUNCTION: ICD-10-CM

## 2021-03-23 DIAGNOSIS — F64.9 GENDER DYSPHORIA: ICD-10-CM

## 2021-03-23 DIAGNOSIS — R49.9 VOICE AND RESONANCE DISORDER: ICD-10-CM

## 2021-03-23 PROCEDURE — 99207 PR NO CHARGE LOS: CPT | Performed by: SPEECH-LANGUAGE PATHOLOGIST

## 2021-03-23 PROCEDURE — 92507 TX SP LANG VOICE COMM INDIV: CPT | Mod: GN | Performed by: SPEECH-LANGUAGE PATHOLOGIST

## 2021-03-23 NOTE — PROGRESS NOTES
"Ramila Woods is a 26 year old adult who is being cared for via a billable virtual visit.        The patient has been notified and verbally consented to the following statements:     This video visit will be conducted between you and your provider.    Patient has opted to conduct today's video visit vs an in-person appointment, and is not able to attend due to possible exposure to COVID-19.      If during the course of the call the provider feels a video visit is not appropriate, you will not be charged for this service.    Provider has received verbal consent for billable virtual visit from the patient? Yes    Preferred method for receiving information:     Call initiated at: 8:54 AM  Platform used to conduct today's virtual appointment: AM Well Video  Location of provider: Residence  Location of patient: [facility] VOICE CLINIC  THERAPY NOTE (CPT 39395)  Patient: Ramila Woods  Date of Service: 3/23/2021  Impressions from most recent evaluation (12/23/20):  \"IMPRESSIONS: Obie Woods is a 25 year old adult, presenting today with voice and resonance disorder in the context of gender dysphoria, as well as dysphonia and dyspnea, as evidenced by today's evaluation.   \"    SUBJECTIVE:  Since the last appointment,   Peggy reports the following:     Overall Obie Woods reports that symptoms are improving    Voice 7-8/10;  \"mooing\" helps to keep their focus forward    Moo voice is very helpful.    Somewhat, but less than last time.  They still have a lot of practice that is needed.     Breathing is between somewhat and a little bit.     Throat comfort - usually no issue indoors, but if they are outside their throat will become tight when the temperature is colder or more humid outside.     OBJECTIVE:    Peggy presents today with the following:  Cough: continues to improve  Breathing: reports that it is acceptable; continues to improve  Voice quality:    Cough - sounds like a barking seal cough - " "more masculine.     Laughter - can become too high in pitch    PATIENT REPORTED MEASURES:  Patient Supplied Answers To SLP QOL Questionnaire  No flowsheet data found.  Speech follow up as discussed with patient:  Dysponia SLP Goals 3/9/2021 3/23/2021   How would you rate your speaking voice quality, if 0 is worst voice quality, and 10 is best voice? 6 7   How much effort is it to speak, if 0 is no extra effort and 10 is maximum effort? 1 1   How well does your voice align with your identity, where 0 is \"my voice doesn't align at all\", and 10 is \"my voice aligns completely\"? 7 8   How severe is your cough /throat clearing, if 0 is no cough at all and 10 is the worst cough? 2 1   How much does your voice problem bother you? Somewhat Somewhat   How much does your cough/throat-clearing problem bother you?            A little bit A little bit   How much does your breathing problem bother you?         Somewhat Somewhat   How much does your throat discomfort bother you?     A little bit Somewhat       THERAPEUTIC ACTIVITIES    Demonstrated previous exercises.  o demonstrated improved technique  o appropriate redirection provided  o instruction provided for increased level of complexity/difficulty    Resonant Voice Therapy (RVT) exercises to promote forward locus of resonance and optimized pattern of laryngeal adduction  ? Speech material that elicits a high, forward tongue position (/n/) was most facilitating  ? Easy descending glide on /m/ utilized in conjunction with relaxed jaw, tongue, and lightly closed lips to facilitate forward resonant sound  ? Use of the carrier phrase \"mmhmm\" instructed to promote generalization to everyday speech  ? Syllable level using /m/ in alternation with cardinal vowels on sustained pitches and speech inflection  ? Word level exercises featuring nasal continuant loaded stimuli  ? Phrase level exercises featuring nasal continuants in more complex phonemic contexts were " employed  ? Instructed with and interval of a descending 5th, as well as an arpeggio pattern was facilitating, prior to progressing to comfortable speech using optimal breath flow.  ? Able to recognize improvement in quality and comfort       Instructed in techniques to improve length of utterance with reduced effort for optimal carryover.  o Instructed with conversation training therapy (CTT) with semi-scripted conversation tasks; this was helpful.    Developed a mental checklist of factors to help trouble shoot moments of difficulty during daily speaking tasks.    Counseling and Education:    Asked many questions about the nature of Obie Woods's symptoms, and I answered all of these thoroughly.    A revised regimen for home practice was instructed.    I provided an AVS and handouts of today's therapeutic activities to facilitate practice.        ASSESSMENT/PLAN  PROGRESS TOWARD LONG TERM GOALS:   Adequate progress; please see above     IMPRESSIONS: voice and resonance disorder in the context of gender dysphoria and symptoms of vocal cord dysfunction.      PLAN: I will see   Peggy in May, at which point we will continue therapy.   For practice goals see AVS.     TOTAL SERVICE TIME:   Call Initiated at: 8:54 AM  Call Ended at: 9:50 AM           CPT Billing Codes:   TREATMENT (29511)  NO CHARGE FACILITY FEE (00190)    Earnestine Byrd M.M. (voice), M.A., CCC/SLP  Speech-Language Pathologist  [facility] Trained Vocologist  [facility]  [phone]  [email or web address]  Pronouns: she/her      *this report was created in part through the use of computerized dictation software, and though reviewed following completion, some typographic errors may persist.  If there is confusion regarding any of this notes contents, please contact me for clarification

## 2021-03-23 NOTE — LETTER
"3/23/2021       RE: Ramila Woods  [address]  [address]  [place] [address]     Dear Colleague,    Thank you for referring your patient, Ramila Woods, to the [facility] VOICE CLINIC [place] [facility]. Please see a copy of my visit note below.    Ramila Woods is a 26 year old adult who is being cared for via a billable virtual visit.        The patient has been notified and verbally consented to the following statements:     This video visit will be conducted between you and your provider.    Patient has opted to conduct today's video visit vs an in-person appointment, and is not able to attend due to possible exposure to COVID-19.      If during the course of the call the provider feels a video visit is not appropriate, you will not be charged for this service.    Provider has received verbal consent for billable virtual visit from the patient? Yes    Preferred method for receiving information:     Call initiated at: 8:54 AM  Platform used to conduct today's virtual appointment: AM Well Video  Location of provider: Residence  Location of patient: [facility] VOICE [facility]  THERAPY NOTE (CPT 63711)  Patient: Ramila Woods  Date of Service: 3/23/2021  Impressions from most recent evaluation (12/23/20):  \"IMPRESSIONS: Obie Woods is a 25 year old adult, presenting today with voice and resonance disorder in the context of gender dysphoria, as well as dysphonia and dyspnea, as evidenced by today's evaluation.   \"    SUBJECTIVE:  Since the last appointment,   Peggy reports the following:     Overall Obie Woods reports that symptoms are improving    Voice 7-8/10;  \"mooing\" helps to keep their focus forward    Moo voice is very helpful.    Somewhat, but less than last time.  They still have a lot of practice that is needed.     Breathing is between somewhat and a little bit.     Throat comfort - usually no issue indoors, but if " "they are outside their throat will become tight when the temperature is colder or more humid outside.     OBJECTIVE:    Peggy presents today with the following:  Cough: continues to improve  Breathing: reports that it is acceptable; continues to improve  Voice quality:    Cough - sounds like a barking seal cough - more masculine.     Laughter - can become too high in pitch    PATIENT REPORTED MEASURES:  Patient Supplied Answers To SLP QOL Questionnaire  No flowsheet data found.  Speech follow up as discussed with patient:  Dysponia SLP Goals 3/9/2021 3/23/2021   How would you rate your speaking voice quality, if 0 is worst voice quality, and 10 is best voice? 6 7   How much effort is it to speak, if 0 is no extra effort and 10 is maximum effort? 1 1   How well does your voice align with your identity, where 0 is \"my voice doesn't align at all\", and 10 is \"my voice aligns completely\"? 7 8   How severe is your cough /throat clearing, if 0 is no cough at all and 10 is the worst cough? 2 1   How much does your voice problem bother you? Somewhat Somewhat   How much does your cough/throat-clearing problem bother you?            A little bit A little bit   How much does your breathing problem bother you?         Somewhat Somewhat   How much does your throat discomfort bother you?     A little bit Somewhat       THERAPEUTIC ACTIVITIES    Demonstrated previous exercises.  o demonstrated improved technique  o appropriate redirection provided  o instruction provided for increased level of complexity/difficulty    Resonant Voice Therapy (RVT) exercises to promote forward locus of resonance and optimized pattern of laryngeal adduction  ? Speech material that elicits a high, forward tongue position (/n/) was most facilitating  ? Easy descending glide on /m/ utilized in conjunction with relaxed jaw, tongue, and lightly closed lips to facilitate forward resonant sound  ? Use of the carrier phrase \"mmhmm\" instructed to promote " generalization to everyday speech  ? Syllable level using /m/ in alternation with cardinal vowels on sustained pitches and speech inflection  ? Word level exercises featuring nasal continuant loaded stimuli  ? Phrase level exercises featuring nasal continuants in more complex phonemic contexts were employed  ? Instructed with and interval of a descending 5th, as well as an arpeggio pattern was facilitating, prior to progressing to comfortable speech using optimal breath flow.  ? Able to recognize improvement in quality and comfort       Instructed in techniques to improve length of utterance with reduced effort for optimal carryover.  o Instructed with conversation training therapy (CTT) with semi-scripted conversation tasks; this was helpful.    Developed a mental checklist of factors to help trouble shoot moments of difficulty during daily speaking tasks.    Counseling and Education:    Asked many questions about the nature of Obie Woods's symptoms, and I answered all of these thoroughly.    A revised regimen for home practice was instructed.    I provided an AVS and handouts of today's therapeutic activities to facilitate practice.        ASSESSMENT/PLAN  PROGRESS TOWARD LONG TERM GOALS:   Adequate progress; please see above     IMPRESSIONS: voice and resonance disorder in the context of gender dysphoria and symptoms of vocal cord dysfunction.      PLAN: I will see   Peggy in May, at which point we will continue therapy.   For practice goals see AVS.     TOTAL SERVICE TIME:   Call Initiated at: 8:54 AM  Call Ended at: 9:50 AM           CPT Billing Codes:   TREATMENT (22696)  NO CHARGE FACILITY FEE (38356)    Earnestine Byrd M.M. (voice) MLIU., CCC/SLP  Speech-Language Pathologist  [facility] Trained Vocologist  [facility]  [phone]  [email or web address]  Pronouns: she/her      *this report was created in part through the use of computerized dictation software, and though reviewed following  completion, some typographic errors may persist.  If there is confusion regarding any of this notes contents, please contact me for clarification        Again, thank you for allowing me to participate in the care of your patient.      Sincerely,    Earnestine Byrd, SLP

## 2021-03-24 ENCOUNTER — VIRTUAL VISIT (OUTPATIENT)
Dept: PSYCHOLOGY | Facility: CLINIC | Age: 26
End: 2021-03-24
Payer: COMMERCIAL

## 2021-03-24 DIAGNOSIS — F64.0 GENDER DYSPHORIA IN ADULT: Primary | ICD-10-CM

## 2021-03-24 DIAGNOSIS — F43.21 ADJUSTMENT DISORDER WITH DEPRESSED MOOD: ICD-10-CM

## 2021-03-24 DIAGNOSIS — F41.1 GENERALIZED ANXIETY DISORDER: ICD-10-CM

## 2021-03-24 PROCEDURE — 90837 PSYTX W PT 60 MINUTES: CPT | Mod: U7 | Performed by: STUDENT IN AN ORGANIZED HEALTH CARE EDUCATION/TRAINING PROGRAM

## 2021-03-24 PROCEDURE — 99207 PR NO BILLABLE SERVICE THIS VISIT: CPT | Performed by: STUDENT IN AN ORGANIZED HEALTH CARE EDUCATION/TRAINING PROGRAM

## 2021-03-24 NOTE — PROGRESS NOTES
[facility] -  Case Progress Note    Date of Service: 3/24/21   Name: Ramila Barnes)  Gender Identity: Non-binary/Agender  Pronouns: They/them  : 1995  Medical Record Number: [id number]  Treating Provider: Nabil Bahena, PhD  Type of Session: Individual  Present in Session: Nabil Ragland  Number of Minutes: 53  Video start time: 3:01pm  Video end time: 3:54pm    Telemedicine Visit: The patient's condition can be safely assessed and treated via synchronous audio and visual telemedicine encounter.       Reason for Telemedicine Visit: Services only offered telehealth     Originating Site (Patient Location): Patient's home     Distant Site (Provider Location): Provider Remote Setting     Consent:  The patient/guardian has verbally consented to: the potential risks and benefits of telemedicine (video visit) versus in person care; bill my insurance or make self-payment for services provided; and responsibility for payment of non-covered services.      Mode of Communication:  Video Conference via [employer] without any technical difficulties.     As the provider I attest to compliance with applicable laws and regulations related to telemedicine.    Current Symptoms/Status:  Client has an established history of experiencing gender dysphoria, psychological distress stemming from incongruence with gender identity and sex assigned at birth exacerbated by primary and secondary sex characteristics. Client reported experiencing the above-mentioned symptoms as well as several depressive symptoms (sadness, fatigue, low energy and interest in pleasurable activities, low motivation, sleep disturbance: difficulty stay asleep and waking up several times in the middle of the night) in adjustment to the coming out process and physically transitioning. Client reports ongoing concerns related to gender dysphoria and navigating interpersonal relationships. Client reported a history of family trauma related to  substance use and addiction, difficulties being accepted as agender/non-binary by family, and navigating societal expectations of relationships.     Progress Toward Treatment Goals:   Client reported making  progress in their interpersonal relationships but struggling with symptoms of depression. Client expressed commitment to working toward their treatment goals: (1) emotionally work through transition-related issues (prepare for top surgery, letters, etc.), (2) improve communication in relationship (open discussion of boundaries, etc.), (3) learn new skills to cope with feelings of gender dysphoria, and (4) work toward building trust in their new relationship.    Intervention: Modality and Description:   Client missed their last session due to difficulties keeping track of their schedule. Client arrived on time and reported experiencing worsening gender dysphoria due to feminine features (e.g. chest, hips, body shape). Client expressed desire to pursue top surgery this summer and requested a letter of support from writer. They also mentioned feeling more anxious than usual due to relationship issues, financial difficulties, and not being able to work (they injured their writing arm a couple of weeks ago and it is in a sling now). They reported continuing to experience symptoms of depression (sadness, fatigue, low energy and interest in pleasurable activities, low motivation, sleep disturbance: difficulty stay asleep and waking up several times in the middle of the night) in adjustment to the coming out process and physically transitioning. They reported feeling like they are not functioning the way they typically do (e.g., not being able to keep to a regular schedule, feeling tired all of the time, not managing time as effectively as before). Therapist used emotion-focused strategies and CBT to help client examine negative thought constellations and self-concept have been impacting their day to day life. Therapist  also provided support and validation and helped client think through self-care strategies and ways to improve sleep hygiene. Therapist and client will work on letter collaboratively on 4/7/2021.    Response to Intervention:  Client was open, engaged, and receptive to strategies and interventions used in session. Client stated they would like to work toward noticing their symptoms and  communicating them to her psychiatrist. Client was open to developing more self-care strategies to promote their own wellness.     Assignment:  - Client will find self-care activities to engage in with partner to reduce stress and improve interpersonal coping skills.  - Client will work on tracking mood weekly.    Interactive Complexity:  Not applicable.    DSM-5 Diagnoses:  302.85 (F64.0) Gender Dysphoria in adolescent and adult  300.02 (F41.1) Generalized Anxiety Disorder  309.0 (F43.21) Adjustment Disorder with depressed mood    Plan/Need for Future Services:  Return for therapy in 1 week to treat diagnosed problems.        Nabil Bahena, PhD  Postdoctoral Fellow

## 2021-03-31 ENCOUNTER — VIRTUAL VISIT (OUTPATIENT)
Dept: PSYCHOLOGY | Facility: CLINIC | Age: 26
End: 2021-03-31
Payer: COMMERCIAL

## 2021-03-31 DIAGNOSIS — F64.0 GENDER DYSPHORIA IN ADOLESCENT AND ADULT: ICD-10-CM

## 2021-03-31 DIAGNOSIS — F41.1 GENERALIZED ANXIETY DISORDER: Primary | ICD-10-CM

## 2021-03-31 DIAGNOSIS — F43.21 ADJUSTMENT DISORDER WITH DEPRESSED MOOD: ICD-10-CM

## 2021-03-31 PROCEDURE — 90834 PSYTX W PT 45 MINUTES: CPT | Mod: U7 | Performed by: STUDENT IN AN ORGANIZED HEALTH CARE EDUCATION/TRAINING PROGRAM

## 2021-03-31 PROCEDURE — 99207 PR NO BILLABLE SERVICE THIS VISIT: CPT | Performed by: STUDENT IN AN ORGANIZED HEALTH CARE EDUCATION/TRAINING PROGRAM

## 2021-03-31 NOTE — PROGRESS NOTES
[facility] -  Case Progress Note    Date of Service: 3/31/21   Name: Ramila Barnes)  Gender Identity: Non-binary/Agender  Pronouns: They/them  : 1995  Medical Record Number: [id number]  Treating Provider: Nabil Bahena, PhD  Type of Session: Individual  Present in Session: Nabil Ragland  Number of Minutes: 51  Video start time: 1:08pm  Video end time: 1:59pm    Telemedicine Visit: The patient's condition can be safely assessed and treated via synchronous audio and visual telemedicine encounter.       Reason for Telemedicine Visit: Services only offered telehealth     Originating Site (Patient Location): Patient's home     Distant Site (Provider Location): Provider Remote Setting     Consent:  The patient/guardian has verbally consented to: the potential risks and benefits of telemedicine (video visit) versus in person care; bill my insurance or make self-payment for services provided; and responsibility for payment of non-covered services.      Mode of Communication:  Video Conference via [employer] without any technical difficulties.     As the provider I attest to compliance with applicable laws and regulations related to telemedicine.    Current Symptoms/Status:  Client has an established history of experiencing gender dysphoria, psychological distress stemming from incongruence with gender identity and sex assigned at birth exacerbated by primary and secondary sex characteristics. Client reported experiencing the above-mentioned symptoms as well as several depressive symptoms (sadness, fatigue, low energy and interest in pleasurable activities, low motivation, sleep disturbance: difficulty stay asleep and waking up several times in the middle of the night) in adjustment to the coming out process and physically transitioning. Client reports ongoing concerns related to gender dysphoria and navigating interpersonal relationships. Client reported a history of family trauma related to  substance use and addiction, difficulties being accepted as agender/non-binary by family, and navigating societal expectations of relationships.     Progress Toward Treatment Goals:   Client reported making progress in their interpersonal relationships but struggling with symptoms of depression in adjustment to the coming out process and physically transitioning. Client expressed commitment to working toward their treatment goals: (1) emotionally work through transition-related issues (prepare for top surgery, letters, etc.), (2) improve communication in relationship (open discussion of boundaries, etc.), (3) learn new skills to cope with feelings of gender dysphoria, and (4) work toward building trust in their new relationship.    Intervention: Modality and Description:   Session began about 8 minutes late due to tech issues. Client reported feeling very anxious and having difficulty with executive functioning skills (as though they cannot complete tasks without feeling distracted, procrastinating, or avoiding tasks altogether). Client also endorsed experiencing worsening symptoms of depression (sadness, fatigue, low energy and interest in pleasurable activities, low motivation, sleep disturbance: difficulty stay asleep and waking up several times in the middle of the night) since starting the coming out process and physically transitioning. They also reported having a lower capacity for social interaction and not wanting to spend time with others. Overall, they expressed feeling like they are not functioning the way they typically do (e.g., not being able to keep to a regular schedule, feeling tired all of the time, not managing time as effectively as before). Therapist used emotion-focused strategies and CBT to help client examine how negative thought constellations and self-concept have been impacting their day to day life. Therapist also provided support and validation and helped client think through self-care  strategies and ways to improve sleep hygiene. Therapist discussed session frequency with client, and in May, sessions will decrease. Therapist and client will work on letter collaboratively on 4/7/2021.    Response to Intervention:  Client was open and receptive to strategies and interventions used in session. Client stated they would like to continue working toward noticing their symptoms and  communicating them to their psychiatrist. Client was open to developing more self-care strategies to promote their own wellness.     Assignment:  - Client will find self-care activities to engage in with partner to reduce stress and improve interpersonal coping skills.  - Client will work on tracking mood weekly.    Interactive Complexity:  Not applicable.    DSM-5 Diagnoses:  300.02 (F41.1) Generalized Anxiety Disorder  309.0 (F43.21) Adjustment Disorder with depressed mood  302.85 (F64.0) Gender Dysphoria in adolescent and adult    Plan/Need for Future Services:  Return for therapy in 1 week to treat diagnosed problems.        Nabil Bahena, PhD  Postdoctoral Fellow

## 2021-04-05 NOTE — PROGRESS NOTES
I did not personally see the patient. I reviewed and agree with the assessment and plan of this note.     Catia Solis, PhD LP

## 2021-04-14 ENCOUNTER — VIRTUAL VISIT (OUTPATIENT)
Dept: PSYCHOLOGY | Facility: CLINIC | Age: 26
End: 2021-04-14
Payer: COMMERCIAL

## 2021-04-14 DIAGNOSIS — F41.1 GENERALIZED ANXIETY DISORDER: ICD-10-CM

## 2021-04-14 DIAGNOSIS — F64.0 GENDER DYSPHORIA IN ADOLESCENT AND ADULT: Primary | ICD-10-CM

## 2021-04-14 DIAGNOSIS — F33.1 MAJOR DEPRESSIVE DISORDER, RECURRENT EPISODE, MODERATE (H): ICD-10-CM

## 2021-04-14 PROCEDURE — 99207 PR NO BILLABLE SERVICE THIS VISIT: CPT | Performed by: STUDENT IN AN ORGANIZED HEALTH CARE EDUCATION/TRAINING PROGRAM

## 2021-04-14 PROCEDURE — 90837 PSYTX W PT 60 MINUTES: CPT | Mod: 95 | Performed by: STUDENT IN AN ORGANIZED HEALTH CARE EDUCATION/TRAINING PROGRAM

## 2021-04-14 NOTE — PROGRESS NOTES
[facility] -  Case Progress Note    Date of Service: 21   Name: Ramila Barnes)  Gender Identity: Non-binary/Agender  Pronouns: They/them  : 1995  Medical Record Number: [id number]  Treating Provider: Nabil Bahena, PhD  Type of Session: Individual  Present in Session: Nabil Ragland  Number of Minutes: 60  Video start time: 2:03pm  Video end time: 3:03pm    Telemedicine Visit: The patient's condition can be safely assessed and treated via synchronous audio and visual telemedicine encounter.       Reason for Telemedicine Visit: Services only offered telehealth     Originating Site (Patient Location): Patient's home     Distant Site (Provider Location): Provider Remote Setting     Consent:  The patient/guardian has verbally consented to: the potential risks and benefits of telemedicine (video visit) versus in person care; bill my insurance or make self-payment for services provided; and responsibility for payment of non-covered services.      Mode of Communication:  Video Conference via [employer] without any technical difficulties.     As the provider I attest to compliance with applicable laws and regulations related to telemedicine.    Current Symptoms/Status:  Client has an established history of experiencing gender dysphoria, psychological distress stemming from incongruence with gender identity and sex assigned at birth exacerbated by primary and secondary sex characteristics. Client reported experiencing the above-mentioned symptoms as well as several depressive symptoms (sadness, fatigue, low energy and interest in pleasurable activities, low motivation, sleep disturbance: difficulty stay asleep and waking up several times in the middle of the night). Client reports ongoing concerns related to gender dysphoria and navigating interpersonal relationships. Client reported a history of family trauma related to substance use and addiction, difficulties being accepted as  agender/non-binary by family, and navigating societal expectations of relationships.     Progress Toward Treatment Goals:   Client reported making progress in their interpersonal relationships but struggling with symptoms of depression in adjustment to the coming out process and physically transitioning. Client expressed commitment to working toward their treatment goals: (1) emotionally work through transition-related issues (prepare for top surgery, letters, etc.), (2) improve communication in relationship (open discussion of boundaries, etc.), and (3) learn new skills to cope with feelings of gender dysphoria.    Intervention: Modality and Description:   Session began a few minutes late due to tech issues. The session focused on beginning to draft a letter of support for surgery and creating a treatment plan (see below). Client reported experiencing increased gender dysphoria, debilitating anxiety, and depression, which they attributed to general dissatisfaction with their progress toward transition and being more visibly non-binary. Client reported experiencing worsening symptoms of depression (sadness, fatigue, low energy and interest in pleasurable activities, low motivation, sleep disturbance: difficulty staying asleep and waking up several times in the middle of the night). They endorsed ongoing issues with executive functioning skills (i.e., difficulty completing tasks without feeling distracted, procrastinating, or avoiding tasks altogether). They also reported having a lower capacity for social interaction and not wanting to spend time with others. Overall, they expressed feeling like they are not functioning the way they typically do (e.g., not being able to keep to a regular schedule, feeling tired all of the time, not managing time as effectively as before). Therapist used emotion-focused strategies and CBT to help client explore feelings and beliefs around imposter syndrome, body dissatisfaction, and  "self-concept have been impacting their day to day life. Therapist also provided support and validation and helped client think through self-care strategies.    Response to Intervention:  Client was open and receptive to strategies and interventions used in session. Client stated they would like to continue working toward noticing their symptoms and  communicating them to their psychiatrist. Client was open to developing more self-care strategies to promote their own wellness.     Assignment:  - Client will find self-care activities to engage in with partner to reduce stress and improve interpersonal coping skills.  - Client will work on tracking mood weekly.    Interactive Complexity:  Not applicable.    DSM-5 Diagnoses:  302.85 (F64.0) Gender Dysphoria in adolescent and adult  300.02 (F41.1) Generalized Anxiety Disorder  296.32 (F33.1) Major Depressive Disorder, Recurrent, Moderate    Plan/Need for Future Services:  Return for therapy in 1 week to treat diagnosed problems.        Nabil Bahena, PhD  Postdoctoral Fellow       _______________________________         TREATMENT PLAN  Date of Treatment Plan: 2021  Name: Ramila Hollowaylock \"Obie\"  : 1995  Medical Record Number: [id number]  Treating Provider: Nabil Bahena,  Type of Session: Individual  Present in Session: Nabil Ragland    Current Status:  Depression/Mood:  Sadness  Increased crying  Decreased interest  Decreased appetite  Sleep irregularities  Low energy  Low self-esteem/guilt  Irritability  Decreased concentration/indecision  Rumination  Dysphoria     Anxiety/Panic:  Worry/Anxiety  GI Distress  Social Fear  Dizziness  Physiological reactivity     Thought:   Distractible  Racing Thoughts     Sensorium:  Paranoia     Behavior/Health:  Increase in goal directed activities  Occupational problems     Chemical Use:  None     Sexual Problems: (Note: client identifies as asexual)  Low Desire  Sexual Aversion     Gender concerns:  Gender dysphoria  Body " dysphoria  Social dysphoria     Suicide Risk Assessment:  Assessed Level of Immediate Risk:  None  Ideation:  NO  Plan:  NO   Means:  NO  Intent:  NO     Homicide Risk Assessment:  Assessed Level of Immediate Risk:  None  Ideation:  NO  Plan:  NO  Means:  NO  Intent:  NO     Impact of Symptoms on Function:  Physical/Health  Social/Family  Work     DSM-5 Diagnoses:   302.6 (F64.0) Gender Dysphoria in adolescent and adult  300.02 (F41.1) Generalized Anxiety Disorder  296.32 (F33.1) Major Depressive Disorder, Recurrent, Moderate     Screening Questionnaires:  Completed the following screening questionnaires:  PHQ-9: Score = 11, indicating moderate depression.  LOVE-7: Score = 12, indicating moderate anxiety     Problem(s):  1. Persistent gender dysphoria   2. Symptoms of anxiety and depression  3. Relationship difficulties  4. Decreased desire in doing things     Short-Long Term Goals  (1) emotionally work through transition-related issues (prepare for top surgery, letters, etc.)  (2) improve communication in relationship (open discussion of boundaries, etc.)  (3) learn new skills to cope with feelings of gender dysphoria     Interventions  Emotion-Focused Techniques  Cognitive-Behavior Therapy     Expected Outcomes and Prognosis  Return to normal functioning     Frequency of Sessions  Weekly sessions for now, biweekly in June     Current Psychoactive Medications:  None  Discharge & Aftercare Goals: TBD  Care Coordination: No     Consent to Treatment:   Patient participated in this treatment planning process and indicated verbal agreement with the above treatment plan.  If patient doesn't sign, indicate why: Telehealth visit due to COVID19 pandemic

## 2021-04-22 ENCOUNTER — VIRTUAL VISIT (OUTPATIENT)
Dept: PSYCHOLOGY | Facility: CLINIC | Age: 26
End: 2021-04-22
Payer: COMMERCIAL

## 2021-04-22 DIAGNOSIS — F64.0 GENDER DYSPHORIA IN ADULT: Primary | ICD-10-CM

## 2021-04-22 DIAGNOSIS — F41.1 GENERALIZED ANXIETY DISORDER: ICD-10-CM

## 2021-04-22 DIAGNOSIS — F33.1 MAJOR DEPRESSIVE DISORDER, RECURRENT EPISODE, MODERATE (H): ICD-10-CM

## 2021-04-22 PROCEDURE — 99207 PR NO BILLABLE SERVICE THIS VISIT: CPT | Performed by: STUDENT IN AN ORGANIZED HEALTH CARE EDUCATION/TRAINING PROGRAM

## 2021-04-22 PROCEDURE — 90834 PSYTX W PT 45 MINUTES: CPT | Mod: 95 | Performed by: STUDENT IN AN ORGANIZED HEALTH CARE EDUCATION/TRAINING PROGRAM

## 2021-04-22 NOTE — PROGRESS NOTES
[facility] -  Case Progress Note    Date of Service: 21   Name: Ramila Barnes)  Gender Identity: Non-binary/Agender  Pronouns: They/them  : 1995  Medical Record Number: [id number]  Treating Provider: Nabil Bahena, PhD  Type of Session: Individual  Present in Session: Nabil Ragland  Number of Minutes: 43  Video start time: 10:07am  Video end time: 10:50am    Telemedicine Visit: The patient's condition can be safely assessed and treated via synchronous audio and visual telemedicine encounter.       Reason for Telemedicine Visit: Services only offered telehealth     Originating Site (Patient Location): Patient's home     Distant Site (Provider Location): Provider Remote Setting     Consent:  The patient/guardian has verbally consented to: the potential risks and benefits of telemedicine (video visit) versus in person care; bill my insurance or make self-payment for services provided; and responsibility for payment of non-covered services.      Mode of Communication:  Video Conference via [employer] due to technical difficulties on [employer].     As the provider I attest to compliance with applicable laws and regulations related to telemedicine.    Current Symptoms/Status:  Client has an established history of experiencing gender dysphoria, psychological distress stemming from incongruence with gender identity and sex assigned at birth exacerbated by primary and secondary sex characteristics. Client reported experiencing the above-mentioned symptoms as well as several depressive symptoms (sadness, fatigue, low energy and interest in pleasurable activities, low motivation, sleep disturbance: difficulty stay asleep and waking up several times in the middle of the night). Client reports ongoing concerns related to gender dysphoria and navigating interpersonal relationships. Client reported a history of family trauma related to substance use and addiction, difficulties being accepted as  agender/non-binary by family, and navigating societal expectations of relationships.     Progress Toward Treatment Goals:   Client reported making progress in their interpersonal relationships but struggling with symptoms of depression in adjustment to the coming out process and physically transitioning. Client expressed commitment to working toward their treatment goals: (1) emotionally work through transition-related issues (prepare for top surgery, letters, etc.), (2) improve communication in relationship (open discussion of boundaries, etc.), and (3) learn new skills to cope with feelings of gender dysphoria.    Intervention: Modality and Description:   Session began late due to tech issues. Client reported experiencing gender dysphoria, debilitating anxiety, and depression, which they attributed to general dissatisfaction with their progress toward transition and being more visibly non-binary. Client reported experiencing worsening symptoms of depression (sadness, fatigue, low energy and interest in pleasurable activities, low motivation, sleep disturbance: difficulty staying asleep and waking up several times in the middle of the night). They also have ongoing issues with executive functioning skills (i.e., difficulty completing tasks without feeling distracted, procrastinating, or avoiding tasks altogether). They continue having lower capacity for social interaction and wanting to have more alone time and days to themself, but expressed worry that their partner would feel hurt by this. Therapist used emotion-focused strategies and CBT to help client process concerns about their relationship, explore feelings and beliefs around body dissatisfaction. Therapist also provided support and validation and helped client think through how to engage in health communication styles.    Response to Intervention:  Client was open and receptive to strategies and interventions used in session. Client stated they would like  "to continue working toward noticing their symptoms and  communicating them to their psychiatrist. Client was open to developing more self-care strategies to promote their own wellness.     Assignment:  - Client will find self-care activities to engage in with partner to reduce stress and improve interpersonal coping skills.  - Client will work on tracking mood weekly.    Interactive Complexity:  Not applicable.    DSM-5 Diagnoses:  302.85 (F64.0) Gender Dysphoria in adolescent and adult  300.02 (F41.1) Generalized Anxiety Disorder  296.32 (F33.1) Major Depressive Disorder, Recurrent, Moderate    Plan/Need for Future Services:  Return for therapy in 1 week to treat diagnosed problems.        Nabil Bahena, PhD  Postdoctoral Fellow       _______________________________         TREATMENT PLAN  Date of Treatment Plan: 2021  Name: Ramila Woods \"Obie\"  : 1995  Medical Record Number: [id number]  Treating Provider: Nabil Bahena,  Type of Session: Individual  Present in Session: Nabil Ragland    Current Status:  Depression/Mood:  Sadness  Increased crying  Decreased interest  Decreased appetite  Sleep irregularities  Low energy  Low self-esteem/guilt  Irritability  Decreased concentration/indecision  Rumination  Dysphoria     Anxiety/Panic:  Worry/Anxiety  GI Distress  Social Fear  Dizziness  Physiological reactivity     Thought:   Distractible  Racing Thoughts     Sensorium:  Paranoia     Behavior/Health:  Increase in goal directed activities  Occupational problems     Chemical Use:  None     Sexual Problems: (Note: client identifies as asexual)  Low Desire  Sexual Aversion     Gender concerns:  Gender dysphoria  Body dysphoria  Social dysphoria     Suicide Risk Assessment:  Assessed Level of Immediate Risk:  None  Ideation:  NO  Plan:  NO   Means:  NO  Intent:  NO     Homicide Risk Assessment:  Assessed Level of Immediate Risk:  None  Ideation:  NO  Plan:  NO  Means:  NO  Intent:  NO     Impact of Symptoms on " Function:  Physical/Health  Social/Family  Work     DSM-5 Diagnoses:   302.6 (F64.0) Gender Dysphoria in adolescent and adult  300.02 (F41.1) Generalized Anxiety Disorder  296.32 (F33.1) Major Depressive Disorder, Recurrent, Moderate     Screening Questionnaires:  Completed the following screening questionnaires:  PHQ-9: Score = 11, indicating moderate depression.  LOVE-7: Score = 12, indicating moderate anxiety     Problem(s):  1. Persistent gender dysphoria   2. Symptoms of anxiety and depression  3. Relationship difficulties  4. Decreased desire in doing things     Short-Long Term Goals  (1) emotionally work through transition-related issues (prepare for top surgery, letters, etc.)  (2) improve communication in relationship (open discussion of boundaries, etc.)  (3) learn new skills to cope with feelings of gender dysphoria     Interventions  Emotion-Focused Techniques  Cognitive-Behavior Therapy     Expected Outcomes and Prognosis  Return to normal functioning     Frequency of Sessions  Weekly sessions for now, biweekly in June     Current Psychoactive Medications:  None  Discharge & Aftercare Goals: TBD  Care Coordination: No     Consent to Treatment:   Patient participated in this treatment planning process and indicated verbal agreement with the above treatment plan.  If patient doesn't sign, indicate why: Telehealth visit due to COVID19 pandemic

## 2021-04-27 NOTE — TELEPHONE ENCOUNTER
FUTURE VISIT INFORMATION      FUTURE VISIT INFORMATION:    Date: 6/4/21    Time: 1:30pm    Location: [facility]  REFERRAL INFORMATION:    Referring provider:  self    Referring providers clinic:  N/A    Reason for visit/diagnosis  top consult    RECORDS REQUESTED FROM:       No recs to collect

## 2021-05-04 ENCOUNTER — VIRTUAL VISIT (OUTPATIENT)
Dept: OTOLARYNGOLOGY | Facility: CLINIC | Age: 26
End: 2021-05-04
Payer: COMMERCIAL

## 2021-05-04 DIAGNOSIS — J38.7 IRRITABLE LARYNX: ICD-10-CM

## 2021-05-04 DIAGNOSIS — J38.3 VOCAL CORD DYSFUNCTION: ICD-10-CM

## 2021-05-04 DIAGNOSIS — R49.9 VOICE AND RESONANCE DISORDER: ICD-10-CM

## 2021-05-04 DIAGNOSIS — F64.9 GENDER DYSPHORIA: ICD-10-CM

## 2021-05-04 DIAGNOSIS — R49.0 DYSPHONIA: Primary | ICD-10-CM

## 2021-05-04 PROCEDURE — 92507 TX SP LANG VOICE COMM INDIV: CPT | Mod: GN | Performed by: SPEECH-LANGUAGE PATHOLOGIST

## 2021-05-04 PROCEDURE — 99207 PR NO CHARGE LOS: CPT | Performed by: SPEECH-LANGUAGE PATHOLOGIST

## 2021-05-04 NOTE — PATIENT INSTRUCTIONS
After Visit Summary - sent    Patient: Obie Woods  Date of Visit: 5/4/2021    Today's pitch: E/Eb3    Order of today's appointment:    Questionnaires (VHI and Dyspnea Index) Dyspnea index went from 30 to 12/40!    Reviewed favorite exercises: keep practicing!    Feminine/Masculine Script: dialogues on p.49 & 50    Conversational training      Earnestine Byrd M.M. (voice), M.A., CCC/SLP  Speech-Language Pathologist  [place] Trained Vocologist  [facility]  [email or web address]  she/her

## 2021-05-04 NOTE — LETTER
"5/4/2021       RE: Ramila Woods  [address]  [address]  [place] [address]     Dear Colleague,    Thank you for referring your patient, Ramila Woods, to the [facility] [place] at [facility]. Please see a copy of my visit note below.    Ramila Woods is a 26 year old adult who is being cared for via a billable virtual visit.        The patient has been notified and verbally consented to the following statements:     This video visit will be conducted between you and your provider.    Patient has opted to conduct today's video visit vs an in-person appointment, and is not able to attend due to possible exposure to COVID-19.      If during the course of the call the provider feels a video visit is not appropriate, you will not be charged for this service.    Provider has received verbal consent for billable virtual visit from the patient? Yes    Preferred method for receiving information: email    Call initiated at: 11:02 AM  Platform used to conduct today's virtual appointment: [employer] Well Video  Location of provider: Residence  Location of patient: [facility]  THERAPY NOTE (CPT 92480)  Patient: Ramila Woods  Date of Service: 5/4/2021  Impressions from most recent evaluation (12/23/20):  \"IMPRESSIONS: Obie Woods is a 25 year old adult, presenting today with voice and resonance disorder in the context of gender dysphoria, as well as dysphonia and dyspnea, as evidenced by today's evaluation.   \"    SUBJECTIVE:  Since the last appointment,  Obie reports the following:     Overall Obie Woods reports that symptoms are improving.  They continues to find the therapeutic activity to be very helpful.    OBJECTIVE:    Peggy presents today with the following:  Cough: continues to improve  Breathing: reports that it is acceptable; continues to improve  Voice quality:    Cough - sounds like a barking seal cough - " "more masculine.     Laughter - can become too high in pitch      PATIENT REPORTED MEASURES:  Speech follow up as discussed with patient:  Dysponia SLP Goals 3/9/2021 3/23/2021 5/4/2021   How would you rate your speaking voice quality, if 0 is worst voice quality, and 10 is best voice? 6 7 7   How much effort is it to speak, if 0 is no extra effort and 10 is maximum effort? 1 1 2   How well does your voice align with your identity, where 0 is \"my voice doesn't align at all\", and 10 is \"my voice aligns completely\"? 7 8 8   How severe is your cough /throat clearing, if 0 is no cough at all and 10 is the worst cough? 2 1 1   How much does your voice problem bother you? Somewhat Somewhat A little bit   How much does your cough/throat-clearing problem bother you?            A little bit A little bit Not at all   How much does your breathing problem bother you?         Somewhat Somewhat A little bit   How much does your throat discomfort bother you?     A little bit Somewhat A little bit     Patient Supplied Answers To VHI Questionnaire  Voice Handicap Index (VHI-10) 5/4/2021   My voice makes it difficult for people to hear me 1   People have difficulty understanding me in a noisy room 2   My voice difficulties restrict my personal and social life.  1   I feel left out of conversations because of my voice 0   My voice problem causes me to lose income 0   I feel as though I have to strain to produce voice 2   The clarity of my voice is unpredictable 1   My voice problem upsets me 1   My voice makes me feel handicapped 1   People ask, \"What's wrong with your voice?\" 0   VHI-10 9     Patient Supplied Answers to Dyspnea Index Questionnaire:  Dyspnea Index 5/4/2021   1. I have trouble getting air in. 1   2. I feel tightness in my throat when I am having cintia breathing problem. 2   3. It takes more effort to breathe than it used to. 0   4. Change in weather affect my breathing problem. 2   5. My breathing gets worse with stress. " 0   6. I make sound/noise breathing in 1   7. I have to strain to breathe. 1   8. My shortness of breath gets worse with exercise or physical activity 3   9. My breathing problem makes me feel stressed. 1   10. My breathing problem casuses me to restrict my personal and social life. 1   Dyspnea Index Total Score 12       THERAPEUTIC ACTIVITIES    Demonstrated previous exercises.  o demonstrated improved technique  o appropriate redirection provided  o instruction provided for increased level of complexity/difficulty    Instructed in techniques to improve length of utterance with reduced effort for optimal carryover.  o Instructed with scripted conversation tasks, and proceeded to semiscripted conversation tasks today.  They found this to be helpful while working on carryover.    Developed a mental checklist of factors to help trouble shoot moments of difficulty during daily speaking tasks.    Counseling and Education:    Asked many questions about the nature of Obie Woods's symptoms, and I answered all of these thoroughly.    A revised regimen for home practice was instructed.    I provided an AVS and handouts of today's therapeutic activities to facilitate practice.      ASSESSMENT/PLAN  PROGRESS TOWARD LONG TERM GOALS:   Good progress; please see report above for objective measures    IMPRESSIONS: voice and resonance disorder in the context of gender dysphoria, as well as dysphonia and dyspnea.  Currently they are very pleased with her progress and believes that their symptoms are within normal limits.  They will continue to practice the therapeutic activities independently and demonstrate a good understanding of how to troubleshoot if symptoms were to worsen.  They are welcome to return as needed for additional therapy, however we agreed that no additional therapeutic appointments were warranted at this time.  Therefore, they are discharged from additional speech services    PLAN: I will see Obie as needed for  therapy.  However they are currently discharged  For practice goals see AVS.     TOTAL SERVICE TIME:   Call Initiated at: 11:02 AM  Call Ended at: 12p           CPT Billing Codes:   TREATMENT (46001)  NO CHARGE FACILITY FEE (92793)    Earnestine Byrd M.M. (voice), M.A., CCC/SLP  Speech-Language Pathologist  [facility] Trained Vocologist  [facility]  [phone]  [email or web address]  Pronouns: she/her      *this report was created in part through the use of computerized dictation software, and though reviewed following completion, some typographic errors may persist.  If there is confusion regarding any of this notes contents, please contact me for clarification      Again, thank you for allowing me to participate in the care of your patient.      Sincerely,    Earnestine Byrd, SLP

## 2021-05-04 NOTE — PROGRESS NOTES
"Ramila Woods is a 26 year old adult who is being cared for via a billable virtual visit.        The patient has been notified and verbally consented to the following statements:     This video visit will be conducted between you and your provider.    Patient has opted to conduct today's video visit vs an in-person appointment, and is not able to attend due to possible exposure to COVID-19.      If during the course of the call the provider feels a video visit is not appropriate, you will not be charged for this service.    Provider has received verbal consent for billable virtual visit from the patient? Yes    Preferred method for receiving information: email    Call initiated at: 11:02 AM  Platform used to conduct today's virtual appointment: AM Well Video  Location of provider: Residence  Location of patient: [facility]  THERAPY NOTE (CPT 43192)  Patient: Ramila Woods  Date of Service: 5/4/2021  Impressions from most recent evaluation (12/23/20):  \"IMPRESSIONS: Obie Woods is a 25 year old adult, presenting today with voice and resonance disorder in the context of gender dysphoria, as well as dysphonia and dyspnea, as evidenced by today's evaluation.   \"    SUBJECTIVE:  Since the last appointment,  Obie reports the following:     Overall Obie Woods reports that symptoms are improving.  They continues to find the therapeutic activity to be very helpful.    OBJECTIVE:    Peggy presents today with the following:  Cough: continues to improve  Breathing: reports that it is acceptable; continues to improve  Voice quality:    Cough - sounds like a barking seal cough - more masculine.     Laughter - can become too high in pitch      PATIENT REPORTED MEASURES:  Speech follow up as discussed with patient:  Dysponia SLP Goals 3/9/2021 3/23/2021 5/4/2021   How would you rate your speaking voice quality, if 0 is worst voice quality, and 10 is best voice? 6 7 7   How much effort is it to " "speak, if 0 is no extra effort and 10 is maximum effort? 1 1 2   How well does your voice align with your identity, where 0 is \"my voice doesn't align at all\", and 10 is \"my voice aligns completely\"? 7 8 8   How severe is your cough /throat clearing, if 0 is no cough at all and 10 is the worst cough? 2 1 1   How much does your voice problem bother you? Somewhat Somewhat A little bit   How much does your cough/throat-clearing problem bother you?            A little bit A little bit Not at all   How much does your breathing problem bother you?         Somewhat Somewhat A little bit   How much does your throat discomfort bother you?     A little bit Somewhat A little bit     Patient Supplied Answers To VHI Questionnaire  Voice Handicap Index (VHI-10) 5/4/2021   My voice makes it difficult for people to hear me 1   People have difficulty understanding me in a noisy room 2   My voice difficulties restrict my personal and social life.  1   I feel left out of conversations because of my voice 0   My voice problem causes me to lose income 0   I feel as though I have to strain to produce voice 2   The clarity of my voice is unpredictable 1   My voice problem upsets me 1   My voice makes me feel handicapped 1   People ask, \"What's wrong with your voice?\" 0   VHI-10 9     Patient Supplied Answers to Dyspnea Index Questionnaire:  Dyspnea Index 5/4/2021   1. I have trouble getting air in. 1   2. I feel tightness in my throat when I am having cintia breathing problem. 2   3. It takes more effort to breathe than it used to. 0   4. Change in weather affect my breathing problem. 2   5. My breathing gets worse with stress. 0   6. I make sound/noise breathing in 1   7. I have to strain to breathe. 1   8. My shortness of breath gets worse with exercise or physical activity 3   9. My breathing problem makes me feel stressed. 1   10. My breathing problem casuses me to restrict my personal and social life. 1   Dyspnea Index Total Score 12 "       THERAPEUTIC ACTIVITIES    Demonstrated previous exercises.  o demonstrated improved technique  o appropriate redirection provided  o instruction provided for increased level of complexity/difficulty    Instructed in techniques to improve length of utterance with reduced effort for optimal carryover.  o Instructed with scripted conversation tasks, and proceeded to semiscripted conversation tasks today.  They found this to be helpful while working on carryover.    Developed a mental checklist of factors to help trouble shoot moments of difficulty during daily speaking tasks.    Counseling and Education:    Asked many questions about the nature of Obie Woods's symptoms, and I answered all of these thoroughly.    A revised regimen for home practice was instructed.    I provided an AVS and handouts of today's therapeutic activities to facilitate practice.      ASSESSMENT/PLAN  PROGRESS TOWARD LONG TERM GOALS:   Good progress; please see report above for objective measures    IMPRESSIONS: voice and resonance disorder in the context of gender dysphoria, as well as dysphonia and dyspnea.  Currently they are very pleased with her progress and believes that their symptoms are within normal limits.  They will continue to practice the therapeutic activities independently and demonstrate a good understanding of how to troubleshoot if symptoms were to worsen.  They are welcome to return as needed for additional therapy, however we agreed that no additional therapeutic appointments were warranted at this time.  Therefore, they are discharged from additional speech services    PLAN: I will see Obie as needed for therapy.  However they are currently discharged  For practice goals see AVS.     TOTAL SERVICE TIME:   Call Initiated at: 11:02 AM  Call Ended at: 12p           CPT Billing Codes:   TREATMENT (66723)  NO CHARGE FACILITY FEE (60836)    Earnestine Byrd M.M. (voice), M.A., CCC/SLP  Speech-Language Pathologist  [email or web address]  Trained Vocologist  [facility]  [phone]  [email or web address]  Pronouns: she/her      *this report was created in part through the use of computerized dictation software, and though reviewed following completion, some typographic errors may persist.  If there is confusion regarding any of this notes contents, please contact me for clarification

## 2021-05-17 ENCOUNTER — VIRTUAL VISIT (OUTPATIENT)
Dept: PSYCHOLOGY | Facility: CLINIC | Age: 26
End: 2021-05-17
Payer: COMMERCIAL

## 2021-05-17 DIAGNOSIS — F33.1 MAJOR DEPRESSIVE DISORDER, RECURRENT EPISODE, MODERATE (H): Primary | ICD-10-CM

## 2021-05-17 DIAGNOSIS — F41.1 GENERALIZED ANXIETY DISORDER: ICD-10-CM

## 2021-05-17 DIAGNOSIS — F64.0 GENDER DYSPHORIA IN ADOLESCENT AND ADULT: ICD-10-CM

## 2021-05-17 PROCEDURE — 99207 PR NO BILLABLE SERVICE THIS VISIT: CPT | Performed by: STUDENT IN AN ORGANIZED HEALTH CARE EDUCATION/TRAINING PROGRAM

## 2021-05-17 PROCEDURE — 90832 PSYTX W PT 30 MINUTES: CPT | Mod: 95 | Performed by: STUDENT IN AN ORGANIZED HEALTH CARE EDUCATION/TRAINING PROGRAM

## 2021-05-17 NOTE — PROGRESS NOTES
[facility] -  Case Progress Note    Date of Service: 21   Name: Ramila Barnes)  Gender Identity: Non-binary/Agender  Pronouns: They/them  : 1995  Medical Record Number: [id number]  Treating Provider: Nabil Bahena, PhD  Type of Session: Individual  Present in Session: Nabil Ragland  Number of Minutes: 34  Video start time: 2:01pm  Video end time: 2:35pm    Telemedicine Visit: The patient's condition can be safely assessed and treated via synchronous audio and visual telemedicine encounter.       Reason for Telemedicine Visit: Services only offered telehealth     Originating Site (Patient Location): Patient's home     Distant Site (Provider Location): Provider Remote Setting     Consent:  The patient/guardian has verbally consented to: the potential risks and benefits of telemedicine (video visit) versus in person care; bill my insurance or make self-payment for services provided; and responsibility for payment of non-covered services.      Mode of Communication:  Video Conference via Doxy due to technical difficulties on Avaz.     As the provider I attest to compliance with applicable laws and regulations related to telemedicine.    Health Maintenance Summary - Mental Health Treatment Plan       Status Date      MENTAL HEALTH TX PLAN Next Due 3/14/2022      Done 2021 [facility] MENTAL HEALTH TX PLAN SCAN     Done 3/5/2020 [facility] MENTAL HEALTH TX PLAN SCAN        Current Symptoms/Status:  Client has an established history of experiencing gender dysphoria, psychological distress stemming from incongruence with gender identity and sex assigned at birth exacerbated by primary and secondary sex characteristics. Client reported experiencing the above-mentioned symptoms as well as several depressive symptoms (sadness, fatigue, low energy and interest in pleasurable activities, low motivation, sleep disturbance: difficulty stay asleep and waking up several times in the middle of the night).  Client reports ongoing concerns related to gender dysphoria and navigating interpersonal relationships. Client reported a history of family trauma related to substance use and addiction, difficulties being accepted as agender/non-binary by family, and navigating societal expectations of relationships.     Progress Toward Treatment Goals:   Client reported making progress in their interpersonal relationships but struggling with symptoms of depression, anxiety, and gender dysphoria. Client expressed commitment to working toward their treatment goals: (1) emotionally work through transition-related issues (prepare for top surgery, letters, etc.), (2) improve communication in relationship (open discussion of boundaries, etc.), and (3) learn new skills to cope with feelings of gender dysphoria.    Intervention: Modality and Description:   Client arrived to session a few minutes late due to tech issues on Ibercheck. 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 They reported feeling stressed and overwhelmed about difficulty sharing their work with others and increasing their work. They also reported experiencing gender dysphoria and struggling to find ways to be more visibly nonbinary. Therapist provided support and validation and helped client think through their relationship to their work and self-worth. Therapist used emotion-focused strategies and CBT to help client process concerns about their work performance, perfectionism, and gender and bodily dysphoria. Session ended early due to client not  "feeling well.    Response to Intervention:  Client was open and receptive to strategies and interventions used in session. Client stated they would like to continue working toward noticing their symptoms and  communicating them to their psychiatrist. Client was open to developing more self-care strategies to promote their own wellness.     Assignment:  - Client will find self-care activities to engage in with partner to reduce stress and improve interpersonal coping skills.    Interactive Complexity:  Not applicable.    DSM-5 Diagnoses:  296.32 (F33.1) Major Depressive Disorder, Recurrent, Moderate  300.02 (F41.1) Generalized Anxiety Disorder  302.85 (F64.0) Gender Dysphoria in adolescent and adult    Plan/Need for Future Services:  Return for therapy in 2 weeks to treat diagnosed problems.        Nabil Bahena, PhD  Postdoctoral Fellow       _______________________________         TREATMENT PLAN  Date of Treatment Plan: 2021  Name: Ramila Woods \"Obie\"  : 1995  Medical Record Number: [id number]  Treating Provider: Nabil Bahena,  Type of Session: Individual  Present in Session: Nabil Ragland    Current Status:  Depression/Mood:  Sadness  Increased crying  Decreased interest  Decreased appetite  Sleep irregularities  Low energy  Low self-esteem/guilt  Irritability  Decreased concentration/indecision  Rumination  Dysphoria     Anxiety/Panic:  Worry/Anxiety  GI Distress  Social Fear  Dizziness  Physiological reactivity     Thought:   Distractible  Racing Thoughts     Sensorium:  Paranoia     Behavior/Health:  Increase in goal directed activities  Occupational problems     Chemical Use:  None     Sexual Problems: (Note: client identifies as asexual)  Low Desire  Sexual Aversion     Gender concerns:  Gender dysphoria  Body dysphoria  Social dysphoria     Suicide Risk Assessment:  Assessed Level of Immediate Risk:  None  Ideation:  NO  Plan:  NO   Means:  NO  Intent:  NO     Homicide Risk Assessment:  Assessed " Level of Immediate Risk:  None  Ideation:  NO  Plan:  NO  Means:  NO  Intent:  NO     Impact of Symptoms on Function:  Physical/Health  Social/Family  Work     DSM-5 Diagnoses:   302.6 (F64.0) Gender Dysphoria in adolescent and adult  300.02 (F41.1) Generalized Anxiety Disorder  296.32 (F33.1) Major Depressive Disorder, Recurrent, Moderate     Screening Questionnaires:  Completed the following screening questionnaires:  PHQ-9: Score = 11, indicating moderate depression.  LOVE-7: Score = 12, indicating moderate anxiety     Problem(s):  1. Persistent gender dysphoria   2. Symptoms of anxiety and depression  3. Relationship difficulties  4. Decreased desire in doing things     Short-Long Term Goals  (1) emotionally work through transition-related issues (prepare for top surgery, letters, etc.)  (2) improve communication in relationship (open discussion of boundaries, etc.)  (3) learn new skills to cope with feelings of gender dysphoria     Interventions  Emotion-Focused Techniques  Cognitive-Behavior Therapy     Expected Outcomes and Prognosis  Return to normal functioning     Frequency of Sessions  Weekly sessions for now, biweekly in June     Current Psychoactive Medications:  None  Discharge & Aftercare Goals: TBD  Care Coordination: No     Consent to Treatment:   Patient participated in this treatment planning process and indicated verbal agreement with the above treatment plan.  If patient doesn't sign, indicate why: Telehealth visit due to COVID19 pandemic

## 2021-06-04 ENCOUNTER — OFFICE VISIT (OUTPATIENT)
Dept: PLASTIC SURGERY | Facility: CLINIC | Age: 26
End: 2021-06-04
Payer: COMMERCIAL

## 2021-06-04 ENCOUNTER — PRE VISIT (OUTPATIENT)
Dept: PLASTIC SURGERY | Facility: CLINIC | Age: 26
End: 2021-06-04

## 2021-06-04 VITALS
HEIGHT: 66 IN | SYSTOLIC BLOOD PRESSURE: 116 MMHG | HEART RATE: 79 BPM | WEIGHT: 144 LBS | DIASTOLIC BLOOD PRESSURE: 66 MMHG | BODY MASS INDEX: 23.14 KG/M2 | OXYGEN SATURATION: 99 %

## 2021-06-04 DIAGNOSIS — F64.0 GENDER DYSPHORIA IN ADOLESCENT AND ADULT: Primary | ICD-10-CM

## 2021-06-04 PROCEDURE — 99214 OFFICE O/P EST MOD 30 MIN: CPT | Performed by: PLASTIC SURGERY

## 2021-06-04 RX ORDER — CEFAZOLIN SODIUM 2 G/50ML
2 SOLUTION INTRAVENOUS
Status: CANCELLED | OUTPATIENT
Start: 2021-06-04

## 2021-06-04 RX ORDER — CEFAZOLIN SODIUM 2 G/50ML
2 SOLUTION INTRAVENOUS SEE ADMIN INSTRUCTIONS
Status: CANCELLED | OUTPATIENT
Start: 2021-06-04

## 2021-06-04 ASSESSMENT — MIFFLIN-ST. JEOR: SCORE: 1404.93

## 2021-06-04 ASSESSMENT — PAIN SCALES - GENERAL: PAINLEVEL: NO PAIN (0)

## 2021-06-04 NOTE — LETTER
6/4/2021       RE: Ramila Woods  [address]  [address]  [facility] [address]     Dear Colleague,    Thank you for referring your patient, Ramila Woods, to the [facility] [place] [facility]. Please see a copy of my visit note below.    PLASTIC SURGERY HISTORY AND PHYSICAL    Chief Complaint: Gender dysphoria, requesting top surgery.     HPI: Patient is a 26 year old transgender/agender person who prefers they/them pronouns, requesting top surgery.  Patient has been considering undergoing top surgery for the past 5 years.  The patient has history of chest binding occasionally.  By undergoing top surgery, the patient would like to achieve congruence between the  physical body with chosen gender identity.  The patient transitioned about 2 years ago to close friends and family.  Reports supportive family and friends.  Chosen name is Obie.  The patient is not on hormone therapy currently.  Denies any previous breast history. Maternal aunt recently diagnosed with breast cancer in her early 50s. Denies smoking. Denies family h/o bleeding disorders.     PMH:   No past medical history on file.    PSH:   No past surgical history on file.    FH:   Family History   Problem Relation Age of Onset     Lupus Mother      Depression Mother      Lupus Maternal Grandmother      Depression Father         SH:   Social History     Tobacco Use     Smoking status: Passive Smoke Exposure - Never Smoker     Smokeless tobacco: Never Used     Tobacco comment: 2nd hand smoke exposure years ago   Substance Use Topics     Alcohol use: Never     Frequency: Never     Drug use: Never      Lives in [place]. [place] up in between [place]. Spent some time in [facility]. Has been living here for 5 years. Going to school at [employer] for 3 years art/comics. 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    MEDS:     Current  "Outpatient Medications:      glycopyrrolate (GLYCATE) 2 MG tablet, , Disp: , Rfl:      traZODone (DESYREL) 100 MG tablet, Take 100 mg by mouth At Bedtime, Disp: , Rfl:      venlafaxine (EFFEXOR-ER) 225 MG 24 hr tablet, Take 225 mg by mouth daily, Disp: , Rfl:        ALLERGIES:     Allergies   Allergen Reactions     Dust Mites      Seasonal Allergies         FH: None.     ROS: Negative except for HPI     PHYSICAL EXAMINATION:   /66   Pulse 79   Ht 1.676 m (5' 6\")   Wt 65.3 kg (144 lb)   SpO2 99%   BMI 23.24 kg/m     BMI: Body mass index is 23.24 kg/m .  General: No acute distress.    Chest examination was performed in the presence of a chaperone.    General chest shape: Flat    Bilateral grade 0 ptosis.    Pectoralis muscles intact bilaterally.    Bilateral lateral thoracic rolls are none.    Notch to nipple distance is 20 cm on the RIGHT and 20.5 cm on the LEFT.    Nipple to fold distance is 8 cm on the RIGHT and 7 cm on the LEFT.   Areole or diameter is 4 cm on the RIGHT and 4 cm on the LEFT.   Base width is 13 cm on the RIGHT and 13 cm on the LEFT.   Manubrium to current IMF distance: 20.5 cm  IMF to posterior iliac crest distance: 18.5 cm     ASSESSMENT: Gender dysphoria, requesting top surgery.     PLAN: The patient is a potential candidate for bilateral simple complete mastectomy with free nipple graft reconstruction as a form of chest gender confirmation surgery. Patient has provided us with a letter of support.  We will review this.  I am requesting a baseline screening mammogram.    I explained this outpatient procedure in detail today.  I explained the risks to include bleeding, infection, injury to surrounding structures, fluid collection, nipple or nipple graft loss, nipple sensory loss, change in nipple size, wound healing difficulties, contour deformity, dog ears, asymmetry, and need for revision surgery.  Patient accepts these risks and wishes to proceed with surgery.       Total time spent " with for this visit was at least 30 minutes, including review of documentation, counseling/discussion with patient, documentation, and organizing any potential follow-up.      Rayna Levine MD  Plastic & Reconstructive Surgery  Pager: [phone]

## 2021-06-04 NOTE — PROGRESS NOTES
PLASTIC SURGERY HISTORY AND PHYSICAL    Chief Complaint: Gender dysphoria, requesting top surgery.     HPI: Patient is a 26 year old transgender/agender person who prefers they/them pronouns, requesting top surgery.  Patient has been considering undergoing top surgery for the past 5 years.  The patient has history of chest binding occasionally.  By undergoing top surgery, the patient would like to achieve congruence between the  physical body with chosen gender identity.  The patient transitioned about 2 years ago to close friends and family.  Reports supportive family and friends.  Chosen name is Obie.  The patient is not on hormone therapy currently.  Denies any previous breast history. Maternal aunt recently diagnosed with breast cancer in her early 50s. Denies smoking. Denies family h/o bleeding disorders.     PMH:   No past medical history on file.    PSH:   No past surgical history on file.    FH:   Family History   Problem Relation Age of Onset     Lupus Mother      Depression Mother      Lupus Maternal Grandmother      Depression Father         SH:   Social History     Tobacco Use     Smoking status: Passive Smoke Exposure - Never Smoker     Smokeless tobacco: Never Used     Tobacco comment: 2nd hand smoke exposure years ago   Substance Use Topics     Alcohol use: Never     Frequency: Never     Drug use: Never      Lives in [place]. [place] in [place]. Spent some time in [place] [place]. Has been living here for 5 years. Going to school at [employer] for 3 years art/comics. 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    MEDS:     Current Outpatient Medications:      glycopyrrolate (GLYCATE) 2 MG tablet, , Disp: , Rfl:      traZODone (DESYREL) 100 MG tablet, Take 100 mg by mouth At Bedtime, Disp: , Rfl:      venlafaxine (EFFEXOR-ER) 225 MG 24 hr tablet, Take 225 mg by mouth daily, Disp: , Rfl:        ALLERGIES:     Allergies   Allergen Reactions     Dust Mites      Seasonal Allergies   "       FH: None.     ROS: Negative except for HPI     PHYSICAL EXAMINATION:   /66   Pulse 79   Ht 1.676 m (5' 6\")   Wt 65.3 kg (144 lb)   SpO2 99%   BMI 23.24 kg/m     BMI: Body mass index is 23.24 kg/m .  General: No acute distress.    Chest examination was performed in the presence of a chaperone.    General chest shape: Flat    Bilateral grade 0 ptosis.    Pectoralis muscles intact bilaterally.    Bilateral lateral thoracic rolls are none.    Notch to nipple distance is 20 cm on the RIGHT and 20.5 cm on the LEFT.    Nipple to fold distance is 8 cm on the RIGHT and 7 cm on the LEFT.   Areole or diameter is 4 cm on the RIGHT and 4 cm on the LEFT.   Base width is 13 cm on the RIGHT and 13 cm on the LEFT.   Manubrium to current IMF distance: 20.5 cm  IMF to posterior iliac crest distance: 18.5 cm     ASSESSMENT: Gender dysphoria, requesting top surgery.     PLAN: The patient is a potential candidate for bilateral simple complete mastectomy with free nipple graft reconstruction as a form of chest gender confirmation surgery. Patient has provided us with a letter of support.  We will review this.  I am requesting a baseline screening mammogram.    I explained this outpatient procedure in detail today.  I explained the risks to include bleeding, infection, injury to surrounding structures, fluid collection, nipple or nipple graft loss, nipple sensory loss, change in nipple size, wound healing difficulties, contour deformity, dog ears, asymmetry, and need for revision surgery.  Patient accepts these risks and wishes to proceed with surgery.       Total time spent with for this visit was at least 30 minutes, including review of documentation, counseling/discussion with patient, documentation, and organizing any potential follow-up.      Rayna Levine MD  Plastic & Reconstructive Surgery  Pager: [phone]      "

## 2021-06-22 ENCOUNTER — VIRTUAL VISIT (OUTPATIENT)
Dept: PSYCHOLOGY | Facility: CLINIC | Age: 26
End: 2021-06-22
Payer: COMMERCIAL

## 2021-06-22 DIAGNOSIS — F64.0 GENDER DYSPHORIA IN ADOLESCENT AND ADULT: ICD-10-CM

## 2021-06-22 DIAGNOSIS — F41.1 GENERALIZED ANXIETY DISORDER: ICD-10-CM

## 2021-06-22 DIAGNOSIS — F33.1 MAJOR DEPRESSIVE DISORDER, RECURRENT EPISODE, MODERATE (H): Primary | ICD-10-CM

## 2021-06-22 PROCEDURE — 99207 PR NO BILLABLE SERVICE THIS VISIT: CPT | Performed by: STUDENT IN AN ORGANIZED HEALTH CARE EDUCATION/TRAINING PROGRAM

## 2021-06-22 PROCEDURE — 90834 PSYTX W PT 45 MINUTES: CPT | Mod: 95 | Performed by: STUDENT IN AN ORGANIZED HEALTH CARE EDUCATION/TRAINING PROGRAM

## 2021-06-22 NOTE — LETTER
June 22, 2021      [facility]  [facility]  [address] [place] [id number]    Re: Ramila Woods   1995  Primary letter of support for gender affirmation surgery (chest surgery)    Dr. Tan,    I am a gender specialist with a Ph.D. in Counseling Psychology. I am employed as a postdoctoral fellow at the [facility] at the [place] [facility], and my position has a specific emphasis on gender. Furthermore, I work in the Transgender Health Program under the supervision of FARHAT Solis, Ph.D., L.P. It is my pleasure to write this letter of referral for my patient, Obie Woods, who has received services through the [place] [facility] since 2/13/2020. Obie Woods is a 26-year-old nonbinary person who initially sought services to explore their gender identity and make decisions about medical interventions to address their gender dysphoria. Their most recent appointment for individual psychotherapy occurred on 4/14/2021. They have been seen for psychological services at this clinic from 2/13/2020 to present.    I have worked with Obie as their primary mental health provider since 9/28/2020 and feel qualified to write this letter of referral in my role as a Postdoctoral Fellow under the supervision of Dr. FARHAT Solis, PhD, LP. I feel confident in my diagnostic assessment and referral of this patient for medical intervention services.     Obie completed a diagnostic session as well as 12 individual psychotherapy appointments with me. Additionally, they completed a thorough psychological evaluation consisting of completion of a set of evaluative measures of gender dysphoria, psychosocial adjustment, sexual health, and overall psychological functioning [facility] Tal Reina.    Obie has been diagnosed with gender dysphoria. They have exhibited a persistent and longstanding gender and anatomic  dysphoria related to their birth assigned sex since 11 years old. They have undergone significant and permanent changes such as socially transitioning to live fulltime in their affirmed gender since July 2019.     Additionally, Obie has been diagnosed with Generalized Anxiety Disorder, and Major Depressive Disorder, Recurrent, Moderate. Their symptoms have been well managed with psychotherapy and have improved with social transition. Obie's mental health concerns are stable and do not pose any barrier to their ability to consent and undergo gender affirming surgery at this time.     Obie currently meets the [employer] Association for Transgender Health (WPATH) Standards of Care for surgical gender affirmation for gender dysphoric persons. Surgery and affiliated medical interventions to reduce gender dysphoria are considered medically necessary treatments by the WPATH Standards of Care for transgender persons. I support their decision to pursue medically necessary surgery at this time and attest to their ability to make an informed consensual decision about their care. They are over the age of consent in their state of residence. Gender affirmation surgery is expected to decrease their gender dysphoria and psychological distress and improve their overall psychological functioning.     Obie has educated themself about the surgery and is aware of its risks and benefits. Given their concerns and the availability of this type of surgery, I support their choice of surgeon. We have thoroughly discussed their expectations and the surgical process including pre-operative planning and preparation. We have identified an aftercare and support plan, which includes continuation of supportive psychotherapy throughout recovery. They are well adjusted and thoughtful, and have approached their decision making for surgery with intention and care.    This letter of referral is based on the psychological indication for surgery and  did not include a physical examination to assess medical contraindications for the surgical procedure. This letter of referral is valid for one year from the date of approval.      If you have any questions, or are in need of additional information, please do not hesitate to contact me.    Sincerely,    Nabil Bahena, PhD  Postdoctoral Fellow  Transgender Health Services   [facility]   [place] [facility]   [facility] [employer]       FARHAT Solis, PhD LP   Supervisor and    Transgender Health Services   [facility]   [place] [facility]   [facility]

## 2021-06-22 NOTE — Clinical Note
A referral is being made to Dr. Mackey for HRT information session. Client was informed that this is an information session only and an exam or labs will not happen at this visit. Client is especially interested in information regarding estrogen blockers and options in additional to testosterone in order to assist with a decision. Client would like advice from Dr. Mackey to consider next steps.

## 2021-06-23 NOTE — PROGRESS NOTES
[facility] -  Case Progress Note    Date of Service: 21   Name: Ramila Barnes)  Gender Identity: Non-binary/Agender  Pronouns: They/them  : 1995  Medical Record Number: [id number]  Treating Provider: Nabil Bahena, PhD  Type of Session: Individual  Present in Session: Nabil Ragland  Number of Minutes: 45  Video start time: 2:10pm  Video end time: 2:55pm    Telemedicine Visit: The patient's condition can be safely assessed and treated via synchronous audio and visual telemedicine encounter.       Reason for Telemedicine Visit: Services only offered telehealth     Originating Site (Patient Location): Patient's home     Distant Site (Provider Location): Provider Remote Setting     Consent:  The patient/guardian has verbally consented to: the potential risks and benefits of telemedicine (video visit) versus in person care; bill my insurance or make self-payment for services provided; and responsibility for payment of non-covered services.      Mode of Communication:  Video Conference via Doxy due to technical difficulties on Drillster.     As the provider I attest to compliance with applicable laws and regulations related to telemedicine.    Health Maintenance Summary - Mental Health Treatment Plan       Status Date      MENTAL HEALTH TX PLAN Next Due 3/14/2022      Done 2021 [facility] MENTAL HEALTH TX PLAN SCAN     Done 3/5/2020 [facility] MENTAL HEALTH TX PLAN SCAN        Current Symptoms/Status:  Client has an established history of experiencing gender dysphoria, psychological distress stemming from incongruence with gender identity and sex assigned at birth exacerbated by primary and secondary sex characteristics. Client reported experiencing the above-mentioned symptoms as well as several depressive symptoms (sadness, fatigue, low energy and interest in pleasurable activities, low motivation, sleep disturbance: difficulty stay asleep and waking up several times in the middle of the night).  Client reports ongoing concerns related to gender dysphoria and navigating interpersonal relationships. Client reported a history of family trauma related to substance use and addiction, difficulties being accepted as agender/non-binary by family, and navigating societal expectations of relationships.     Progress Toward Treatment Goals:   Client reported making progress in their interpersonal relationships but struggling with symptoms of depression, anxiety, and gender dysphoria. Client expressed commitment to working toward their treatment goals: (1) emotionally work through transition-related issues (prepare for top surgery, letters, etc.), (2) improve communication in relationship (open discussion of boundaries, etc.), and (3) learn new skills to cope with feelings of gender dysphoria.    Intervention: Modality and Description:   Client arrived to session a late due to tech issues on [email or web address]. Client reported experiencing worsening symptoms of depression (sadness, fatigue, low energy and interest in pleasurable activities, low motivation, sleep disturbance: difficulty staying asleep and waking up several times in the middle of the night) and anxiety, which they attributed to ongoing stressors, sociopolitical issues, and complicated feelings about being an active citizen. They reported feeling stressed and overwhelmed about wanting to be more actively involved in advocacy and activism in their community. They also reported experiencing gender dysphoria and struggling to find ways to be more visibly nonbinary. Therapist provided support and validation and helped client think through their relationship to their work and self-worth. Therapist used emotion-focused strategies and CBT to help client process concerns about their expectations of themself, and gender and bodily dysphoria.     Response to Intervention:  Client was open and receptive to strategies and interventions used in session. Client stated they would  "like to continue working toward noticing their symptoms and  communicating them to their psychiatrist. Client was open to developing more self-care strategies to promote their own wellness.     Assignment:  - Client will find self-care activities to engage in with partner to reduce stress and improve interpersonal coping skills.    Interactive Complexity:  Not applicable.    DSM-5 Diagnoses:  296.32 (F33.1) Major Depressive Disorder, Recurrent, Moderate  302.85 (F64.0) Gender Dysphoria in adolescent and adult  300.02 (F41.1) Generalized Anxiety Disorder    Plan/Need for Future Services:  Return for therapy in 2 weeks to treat diagnosed problems.        Nabil Bahena, PhD  Postdoctoral Fellow       _______________________________         TREATMENT PLAN  Date of Treatment Plan: 2021  Name: Ramila Woods \"Obie\"  : 1995  Medical Record Number: [id number]  Treating Provider: Nabil Bahena,  Type of Session: Individual  Present in Session: Nabil Ragland    Current Status:  Depression/Mood:  Sadness  Increased crying  Decreased interest  Decreased appetite  Sleep irregularities  Low energy  Low self-esteem/guilt  Irritability  Decreased concentration/indecision  Rumination  Dysphoria     Anxiety/Panic:  Worry/Anxiety  GI Distress  Social Fear  Dizziness  Physiological reactivity     Thought:   Distractible  Racing Thoughts     Sensorium:  Paranoia     Behavior/Health:  Increase in goal directed activities  Occupational problems     Chemical Use:  None     Sexual Problems: (Note: client identifies as asexual)  Low Desire  Sexual Aversion     Gender concerns:  Gender dysphoria  Body dysphoria  Social dysphoria     Suicide Risk Assessment:  Assessed Level of Immediate Risk:  None  Ideation:  NO  Plan:  NO   Means:  NO  Intent:  NO     Homicide Risk Assessment:  Assessed Level of Immediate Risk:  None  Ideation:  NO  Plan:  NO  Means:  NO  Intent:  NO     Impact of Symptoms on " Function:  Physical/Health  Social/Family  Work     DSM-5 Diagnoses:   302.6 (F64.0) Gender Dysphoria in adolescent and adult  300.02 (F41.1) Generalized Anxiety Disorder  296.32 (F33.1) Major Depressive Disorder, Recurrent, Moderate     Screening Questionnaires:  Completed the following screening questionnaires:  PHQ-9: Score = 11, indicating moderate depression.  LOVE-7: Score = 12, indicating moderate anxiety     Problem(s):  1. Persistent gender dysphoria   2. Symptoms of anxiety and depression  3. Relationship difficulties  4. Decreased desire in doing things     Short-Long Term Goals  (1) emotionally work through transition-related issues (prepare for top surgery, letters, etc.)  (2) improve communication in relationship (open discussion of boundaries, etc.)  (3) learn new skills to cope with feelings of gender dysphoria     Interventions  Emotion-Focused Techniques  Cognitive-Behavior Therapy     Expected Outcomes and Prognosis  Return to normal functioning     Frequency of Sessions  Weekly sessions for now, biweekly in June     Current Psychoactive Medications:  None  Discharge & Aftercare Goals: TBD  Care Coordination: No     Consent to Treatment:   Patient participated in this treatment planning process and indicated verbal agreement with the above treatment plan.  If patient doesn't sign, indicate why: Telehealth visit due to COVID19 pandemic

## 2021-07-15 ENCOUNTER — VIRTUAL VISIT (OUTPATIENT)
Dept: FAMILY MEDICINE | Facility: CLINIC | Age: 26
End: 2021-07-15
Payer: COMMERCIAL

## 2021-07-15 DIAGNOSIS — F64.9 GENDER DYSPHORIA: Primary | ICD-10-CM

## 2021-07-15 PROCEDURE — 99202 OFFICE O/P NEW SF 15 MIN: CPT | Mod: 95 | Performed by: FAMILY MEDICINE

## 2021-07-15 NOTE — PROGRESS NOTES
The patient has been notified of following:       Patient has given verbal consent for Video visit? Yes    Patient would like the video invitation sent by: Send to e-mail at: [employer]De[employer]    Video Start Time: 3:25 PM         Olya Ragland is a 26 year old individual who presents today for the following   health issues:    This is a transgender information session at the request of Dr. Bahena.    IDENTIFICATION:  A 26-year-old sex-assigned-female-at-birth patient for an information session on hormone therapy.  The patient identifies as agender and uses they/them pronouns.  Their goals for hormone therapy are to develop a body that is as androgynous as possible and would like to also pursue top surgery.  In further reviewing specific body characteristics that would be desired with hormone therapy, they would like fat displacement, particularly reducing the size of hips and thighs, to have a slightly deeper voice, but not necessarily in the traditional male range, and to get rid of menses.  They do not want clitoral growth, facial hair or balding, but are indifferent to fertility preservation or body hair.      ASSESSMENT AND PLAN:  Gender dysphoria  Counseled patient regarding options for hormone therapy  The options for hormone therapy approaches to achieving these outcomes for them were reviewed with the understanding that hormone therapy cannot give specific outcomes in which all things in one category are achieved and where all the nondesired outcomes are avoided.  Discussed with the patient that low-dose testosterone therapy can be used in conjunction with other medications for menstrual suppression to achieve many outcomes. If they are interested in pursuing testosterone therapy, I recommend they schedule a full medical evaluation and that a general medical exam would be needed, either with myself or with their primary care provider, as one of the steps for hormone therapy.   counseling as provided above.     Follow up for medical evaluation as desired.        25 minutes spent on the date of the encounter doing chart review, patient visit and documentation       Video-Visit Details    Type of service:  Video Visit    Video End Time (time video stopped): 347    Originating Location (pt. Location): Home    Distant Location (provider location):  [facility]     Mode of Communication:  Video Conference via video platform: Verena Mackey MD        Results by [facility]  Questions were elicited and answered.

## 2021-07-16 ENCOUNTER — TELEPHONE (OUTPATIENT)
Dept: PLASTIC SURGERY | Facility: CLINIC | Age: 26
End: 2021-07-16

## 2021-07-16 NOTE — TELEPHONE ENCOUNTER
Writer called pt regarding interest in surgery. Discussed possibility of seeing Britney for surgery. Pt was interested depending on wait time. Writer to check in with Britney's team to discuss possibility of scheduling earlier.     Nick Castañeda

## 2021-07-16 NOTE — TELEPHONE ENCOUNTER
"[facility] Call Center    Phone Message    May a detailed message be left on voicemail: yes     Reason for Call: Other: Patient submitted online request to be seen - \"FTM top surgery. I was scheduled with Dr. Levine but I heard that he's moving. I have everything I need to begin top surgery including a recent mammogram, a letter from a therapist, and the measurements Dr. Levine took. \"     Action Taken: Message routed to:  [facility] ([facility] Gender Care    Travel Screening: Not Applicable                                                                        "

## 2021-07-20 DIAGNOSIS — F64.0 GENDER DYSPHORIA IN ADOLESCENT AND ADULT: Primary | ICD-10-CM

## 2021-07-20 RX ORDER — CEFAZOLIN SODIUM 2 G/50ML
2 SOLUTION INTRAVENOUS
Status: CANCELLED | OUTPATIENT
Start: 2021-07-20

## 2021-07-20 RX ORDER — CEFAZOLIN SODIUM 2 G/50ML
2 SOLUTION INTRAVENOUS SEE ADMIN INSTRUCTIONS
Status: CANCELLED | OUTPATIENT
Start: 2021-07-20

## 2021-07-21 DIAGNOSIS — Z11.59 ENCOUNTER FOR SCREENING FOR OTHER VIRAL DISEASES: ICD-10-CM

## 2021-07-26 ENCOUNTER — DOCUMENTATION ONLY (OUTPATIENT)
Dept: SURGERY | Facility: CLINIC | Age: 26
End: 2021-07-26

## 2021-07-26 NOTE — PROGRESS NOTES
I contacted the patient via evly and spoke with the patient to confirm the scheduled dates and provide the following information:     Surgeon/surgery date/location:  Dr. Tan on 9/17 at [facility].  Arrival:   6:45 AM   Pre-op consult:   Dr. Tan on 8/31.   Pre-op physical with:   PCP. Patient is aware this needs to be completed within 30 days of surgery.  COVID-19 test:   Central scheduling to schedule.  Post-op:   9/24 and 10/26.    The surgery packet was provided via evly.

## 2021-07-27 ENCOUNTER — VIRTUAL VISIT (OUTPATIENT)
Dept: PSYCHOLOGY | Facility: CLINIC | Age: 26
End: 2021-07-27
Payer: COMMERCIAL

## 2021-07-27 DIAGNOSIS — F41.1 GENERALIZED ANXIETY DISORDER: ICD-10-CM

## 2021-07-27 DIAGNOSIS — F33.1 MAJOR DEPRESSIVE DISORDER, RECURRENT EPISODE, MODERATE (H): Primary | ICD-10-CM

## 2021-07-27 DIAGNOSIS — F64.0 GENDER DYSPHORIA IN ADOLESCENT AND ADULT: ICD-10-CM

## 2021-07-27 PROCEDURE — 90834 PSYTX W PT 45 MINUTES: CPT | Mod: 95 | Performed by: STUDENT IN AN ORGANIZED HEALTH CARE EDUCATION/TRAINING PROGRAM

## 2021-07-27 PROCEDURE — 99207 PR NO BILLABLE SERVICE THIS VISIT: CPT | Performed by: STUDENT IN AN ORGANIZED HEALTH CARE EDUCATION/TRAINING PROGRAM

## 2021-07-27 NOTE — PROGRESS NOTES
[facility] -  Case Progress Note    Date of Service: 21   Name: Ramila Barnes)  Gender Identity: Non-binary/Agender  Pronouns: They/them  : 1995  Medical Record Number: [id number]  Treating Provider: Nabil Bahena, PhD  Type of Session: Individual  Present in Session: Nabil Ragland  Number of Minutes: 42  Video start time: 4:02pm  Video end time: 4:44pm    Telemedicine Visit: The patient's condition can be safely assessed and treated via synchronous audio and visual telemedicine encounter.       Reason for Telemedicine Visit: Services only offered telehealth     Originating Site (Patient Location): Patient's home     Distant Site (Provider Location): Provider Remote Setting     Consent:  The patient/guardian has verbally consented to: the potential risks and benefits of telemedicine (video visit) versus in person care; bill my insurance or make self-payment for services provided; and responsibility for payment of non-covered services.      Mode of Communication:  Video Conference via [employer].     As the provider I attest to compliance with applicable laws and regulations related to telemedicine.    Health Maintenance Summary - Mental Health Treatment Plan       Status Date      MENTAL HEALTH TX PLAN Next Due 3/14/2022      Done 2021 [facility] MENTAL HEALTH TX PLAN SCAN     Done 3/5/2020 [facility] MENTAL HEALTH TX PLAN SCAN        Current Symptoms/Status:  Client has an established history of experiencing gender dysphoria, psychological distress stemming from incongruence with gender identity and sex assigned at birth exacerbated by primary and secondary sex characteristics. Client reported experiencing the above-mentioned symptoms as well as several depressive symptoms (sadness, fatigue, low energy and interest in pleasurable activities, low motivation, sleep disturbance: difficulty stay asleep and waking up several times in the middle of the night). Client reports ongoing concerns  related to gender dysphoria and navigating interpersonal relationships. Client reported a history of family trauma related to substance use and addiction, difficulties being accepted as agender/non-binary by family, and navigating societal expectations of relationships.     Progress Toward Treatment Goals:   Client reported making progress in their interpersonal relationships but struggling with symptoms of depression, anxiety, and gender dysphoria. Client expressed commitment to working toward their treatment goals: (1) emotionally work through transition-related issues (prepare for top surgery, letters, etc.), (2) improve communication in relationship (open discussion of boundaries, etc.), and (3) learn new skills to cope with feelings of gender dysphoria. Client will have top surgery on 9/17/2021.    Intervention: Modality and Description:   Client arrived to session on time. Client reported experiencing a reduction in symptom severity, particularly depression (sadness, fatigue, low energy and interest in pleasurable activities, low motivation, sleep disturbance: difficulty staying asleep and waking up several times in the middle of the night) and anxiety. They reported experiencing gender dysphoria and struggling to find ways to be more visibly nonbinary, but that this has lessened due to euphoria from getting their top surgery scheduled for 9/17/2021 with Dr. Tan. The session focused on planning for their upcoming surgery and discussing their concerns about recovery post-op. Therapist provided support and validation and helped client think through preparation for surgery and recovery plans. Therapist used emotion-focused strategies and CBT to help client process concerns about gender and bodily dysphoria. Next session will focus on completing the diagnostic assessment update.    Response to Intervention:  Client was open and receptive to strategies and interventions used in session. Client stated they would  "like to continue working toward noticing their symptoms and communicating them to their psychiatrist. Client was open to developing more self-care strategies to promote their own wellness.     Assignment:  - Client will continue to work toward their preparation for surgery and post-op recovery plans.    Interactive Complexity:  Not applicable.    DSM-5 Diagnoses:  296.32 (F33.1) Major Depressive Disorder, Recurrent, Moderate  302.85 (F64.0) Gender Dysphoria in adolescent and adult  300.02 (F41.1) Generalized Anxiety Disorder    Plan/Need for Future Services:  Return for therapy in 2 weeks to treat diagnosed problems.        Nabil Bahena, PhD  Postdoctoral Fellow       _______________________________         TREATMENT PLAN  Date of Treatment Plan: 2021  Name: Ramila Woods \"Obie\"  : 1995  Medical Record Number: [id number]  Treating Provider: Nabil Bahena,  Type of Session: Individual  Present in Session: Nabil Ragland    Current Status:  Depression/Mood:  Sadness  Increased crying  Decreased interest  Decreased appetite  Sleep irregularities  Low energy  Low self-esteem/guilt  Irritability  Decreased concentration/indecision  Rumination  Dysphoria     Anxiety/Panic:  Worry/Anxiety  GI Distress  Social Fear  Dizziness  Physiological reactivity     Thought:   Distractible  Racing Thoughts     Sensorium:  Paranoia     Behavior/Health:  Increase in goal directed activities  Occupational problems     Chemical Use:  None     Sexual Problems: (Note: client identifies as asexual)  Low Desire  Sexual Aversion     Gender concerns:  Gender dysphoria  Body dysphoria  Social dysphoria     Suicide Risk Assessment:  Assessed Level of Immediate Risk:  None  Ideation:  NO  Plan:  NO   Means:  NO  Intent:  NO     Homicide Risk Assessment:  Assessed Level of Immediate Risk:  None  Ideation:  NO  Plan:  NO  Means:  NO  Intent:  NO     Impact of Symptoms on Function:  Physical/Health  Social/Family  Work     DSM-5 " Diagnoses:   302.6 (F64.0) Gender Dysphoria in adolescent and adult  300.02 (F41.1) Generalized Anxiety Disorder  296.32 (F33.1) Major Depressive Disorder, Recurrent, Moderate     Screening Questionnaires:  Completed the following screening questionnaires:  PHQ-9: Score = 11, indicating moderate depression.  LOVE-7: Score = 12, indicating moderate anxiety     Problem(s):  1. Persistent gender dysphoria   2. Symptoms of anxiety and depression  3. Relationship difficulties  4. Decreased desire in doing things     Short-Long Term Goals  (1) emotionally work through transition-related issues (prepare for top surgery, letters, etc.)  (2) improve communication in relationship (open discussion of boundaries, etc.)  (3) learn new skills to cope with feelings of gender dysphoria     Interventions  Emotion-Focused Techniques  Cognitive-Behavior Therapy     Expected Outcomes and Prognosis  Return to normal functioning     Frequency of Sessions  Weekly sessions for now, biweekly in June     Current Psychoactive Medications:  None  Discharge & Aftercare Goals: TBD  Care Coordination: No     Consent to Treatment:   Patient participated in this treatment planning process and indicated verbal agreement with the above treatment plan.  If patient doesn't sign, indicate why: Telehealth visit due to COVID19 pandemic

## 2021-08-10 NOTE — PROGRESS NOTES
"[facility]  Gynecology Visit    Reason for Visit: menstrual suppression    HPI:    Obie Woods (they/them) is a 26 year old G0 non-binary person here for discussion of menstrual suppression. They are currently followed by Dr. Kraft in gender clinic and are planning to start testosterone for masculinization and undergo top surgery soon. They report that their periods are normally average, lasting 3-4 days, medium to heavy, every 28 days or so. On heavier bleeding days changes products 2-3 times per day. Uses a heavy pad, doesn't ever get soaked but does pass clots. Does sometimes have painful periods, considers them more mentally taxing. Periods do cause some dysphoria (\"gross\") and notices mood symptoms including anxiety and sadness. Would love to not have periods in the future. They are not interested in future fertility or having biologic children at this time.     They deny any history of blood clot, hypertension. Does have some headaches about 3 times per month, thinks they're more like tension. No history of aura with their headaches. Does sometimes get lightheaded with position changes, did have a syncopal episode a few years ago. Does have some SOB, thinks that's related to vocal cord dysfunction. Denies any chest pains or palpitations.    GYN History  Age at menarche: 11  Periods: see above   LMP: 7/30/21  Sexually active: no, has never been sexually active   History of STIs: no   Pap Smears: has not had a pap smear in the past   History of abnormal paps: no h/o pap smear   Contraception: no     OBHx  G0    PMH  Vocal cord dysfunction   Anxiety/depression   PTSD     PSH  No prior surgical history      Medications    Current Outpatient Medications:      glycopyrrolate (GLYCATE) 2 MG tablet, , Disp: , Rfl:      traZODone (DESYREL) 100 MG tablet, Take 100 mg by mouth At Bedtime, Disp: , Rfl:      venlafaxine (EFFEXOR-ER) 225 MG 24 hr tablet, Take 225 mg by mouth daily, Disp: , Rfl:     Current " "Facility-Administered Medications:      ropivacaine 0.2% (NAROPIN) 550 mL in ON-Q  4 mL/hr (-S holds 400-500 mL) 10\" dual cath disposable pump, , Irrigation, Continuous, Radha Tan MD    Allergies  Allergies   Allergen Reactions     Dust Mites      Seasonal Allergies        Family Hx  Maternal aunt with h/o breast cancer, around age 50. Otherwise, no known family history of breast, ovarian, uterine, or cervical cancer. No known family hx of bleeding disorder, clotting disorder.   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 Lives with partner. Feels safe at home.     ROS: 10-Point ROS negative except as noted in HPI    Physical Exam  /56 (BP Location: Left arm, Patient Position: Chair)   Pulse 71   Ht 1.676 m (5' 6\")   Wt 66.6 kg (146 lb 14.4 oz)   BMI 23.71 kg/m    Gen: Well-appearing, NAD  HEENT: Normocephalic, atraumatic  CV:  Well perfused   Pulm: Breathing comfortably on room air   Ext: No LE edema    Pelvic:  Deferred       Assessment/Plan:  Obie Woods is a 26 year old G0 non-binary person here for discussion of menstrual suppression. They are currently working with Dr. Mackey in gender clinic and planning to start testosterone for masculinization and undergo top surgery with Dr. Tan on 9/17/2021. Options for progesterone only menstrual suppression reviewed today including Depo Provera, Mirena IUD, and Nexplanon implant. We reviewed the risks and benefits of each of these options as well as expected side effect profile. Not discussed today, but could also consider definitive management with hysterectomy in the future if they do not have sufficient relief " from medical management.    # Menstrual suppression  - Information/print outs for IUD, Nexplanon, and Depo Provera given today. Pt is most interested in Mirena IUD placement, if possible at time of top surgery with Dr. Tan  - Message sent to Hernandez Espino for help with scheduling this - currently scheduled for 8:45 at the [facility] [place]   - Unfortunately no Ob/Gyn staff available at that location on 9/17   - Message sent to Dr. Tan to help with coordination of co-procedure  - Will send MyChart with plan when I hear back from Hernandez regarding available providers    # Routine health maintenance  - Due for pap smear, would prefer to have done at the same time as top surgery given difficulty with pelvic exams     Return to clinic prn pap smear findings and symptoms    Staffed with Dr. Colón.       Graciela Dalton MD  OB/GYN Resident PGY-2  2:28 PM August 11, 2021      The patient was seen in resident continuity clinic by Dr. Dalton.  I have reviewed the history and exam, the assessment and plan were jointly made.     Graciela Colón MD, FACOG

## 2021-08-11 ENCOUNTER — OFFICE VISIT (OUTPATIENT)
Dept: OBGYN | Facility: CLINIC | Age: 26
End: 2021-08-11
Payer: COMMERCIAL

## 2021-08-11 VITALS
HEIGHT: 66 IN | HEART RATE: 71 BPM | DIASTOLIC BLOOD PRESSURE: 56 MMHG | SYSTOLIC BLOOD PRESSURE: 100 MMHG | BODY MASS INDEX: 23.61 KG/M2 | WEIGHT: 146.9 LBS

## 2021-08-11 DIAGNOSIS — N94.89 MENSTRUAL SUPPRESSION: Primary | ICD-10-CM

## 2021-08-11 DIAGNOSIS — F64.9 GENDER DYSPHORIA: ICD-10-CM

## 2021-08-11 PROCEDURE — G0463 HOSPITAL OUTPT CLINIC VISIT: HCPCS

## 2021-08-11 PROCEDURE — 99203 OFFICE O/P NEW LOW 30 MIN: CPT | Mod: GE | Performed by: OBSTETRICS & GYNECOLOGY

## 2021-08-11 ASSESSMENT — ANXIETY QUESTIONNAIRES
6. BECOMING EASILY ANNOYED OR IRRITABLE: NOT AT ALL
2. NOT BEING ABLE TO STOP OR CONTROL WORRYING: MORE THAN HALF THE DAYS
1. FEELING NERVOUS, ANXIOUS, OR ON EDGE: MORE THAN HALF THE DAYS
5. BEING SO RESTLESS THAT IT IS HARD TO SIT STILL: NOT AT ALL
3. WORRYING TOO MUCH ABOUT DIFFERENT THINGS: NEARLY EVERY DAY
7. FEELING AFRAID AS IF SOMETHING AWFUL MIGHT HAPPEN: NEARLY EVERY DAY
GAD7 TOTAL SCORE: 11

## 2021-08-11 ASSESSMENT — PATIENT HEALTH QUESTIONNAIRE - PHQ9: 5. POOR APPETITE OR OVEREATING: SEVERAL DAYS

## 2021-08-11 ASSESSMENT — MIFFLIN-ST. JEOR: SCORE: 1418.08

## 2021-08-11 NOTE — PATIENT INSTRUCTIONS
Patient Education        Patient Education     Birth Control: IUD (Intrauterine Device)    The IUD (intrauterine device) is small, flexible, and T-shaped. A trained healthcare provider places it in the uterus. The IUD is one of the most effective birth control methods. It's also reversible. This means it can be removed at any time by a trained healthcare provider. New IUDs are safe and don't have the risks of older types of IUDs.   Pregnancy rates  Talk to your healthcare provider about the effectiveness of this birth control method.  Types of IUDs  IUD insertion is done in the healthcare provider s office. Two types of IUDs are available:    The copper IUD releases a small amount of copper into the uterus. The copper makes it harder for sperm to reach the egg. The device works for at least 10 years.    The progestin IUD releases a hormone called progestin. It causes changes in the uterus to help prevent pregnancy. The device works for 3 to 5 years, depending on which device is chosen. It may be recommended for women who have anemia or heavy and painful periods.  IUDs have thin strings that hang from the opening of the uterus into the vagina. This lets you check that the IUD stays in place.   Things to know about IUDs    IUDs can be used by women who have never been pregnant or by women with a history of sexually transmitted infections (STIs) or tubal pregnancy.    It won't move from the uterus to any other part of the body.    There is a slight risk of the device coming out of the vagina (expulsion).    It may not work in women who have an abnormally shaped uterus.    A copper IUD may cause heavier periods and cramping.    Progestin IUD may cause light periods or no periods at all (irregular bleeding or spotting is possible and normal during first 3 to 6 months).    If you get a sexually transmitted infection with an IUD in place, symptoms may be more severe.    What to report to your healthcare provider  Be sure  your healthcare provider knows if you have:    A sexually transmitted infection (STI) or possible STI    Liver problems    Blood clots (for progestin IUD only)    Breast cancer or a history of breast cancer (progestin IUD only)  [employer] last reviewed this educational content on 5/1/2020 2000-2021 [employer]. All rights reserved. This information is not intended as a substitute for professional medical care. Always follow your healthcare professional's instructions.           Patient Education     Nexplanon Drug Implant 68 mg  Uses  For birth control.  Instructions  Two bandages will cover the area where the roxie is placed. Leave the larger, outer bandage on for 24 hours. Leave the smaller bandage in place for 3-5 days, as instructed by your doctor.  Keep the bandages clean and dry.  This medicine is normally placed under the skin of the upper arm, usually in the arm you do not write with.  A negative pregnancy test may be needed before receiving this medicine.  A second form of birth control may be needed for the first week after the roxie is placed. Ask your doctor or pharmacist about the need for back-up birth control.  Keep the card that includes the date and place the roxie was inserted.  The roxie must be removed after 3 years.  This medicine may cause dark patches to appear on your face. Avoid sunlight and use sunscreen lotion to minimize further darkening of these skin patches.  Avoid prolonged or excessive sunlight exposure. Use sunscreen lotion with SPF 15 or higher.  Please tell your doctor and pharmacist about all the medicines you take. Include both prescription and over-the-counter medicines. Also tell them about any vitamins, herbal medicines, or anything else you take for your health.  It is very important that you follow your doctor's instructions for all blood tests.  It is very important that you keep all appointments for medical exams and tests while on this medicine.  Cautions  Some  patients taking this medicine have experienced serious side effects. Please speak with your doctor to understand the risks and benefits associated with this medicine.  This medicine is associated with an increased risk for serious blood clots. Speak with your doctor about the benefits and risks from using this medicine.  This medicine is associated with an increased risk of serious heart problems, heart attack, and stroke. Please speak with your doctor about the risks and benefits of using this medicine. Contact your doctor immediately if you experience chest pain or difficulty breathing.  Tell your doctor and pharmacist if you ever had an allergic reaction to a medicine. Symptoms of an allergic reaction can include trouble breathing, skin rash, itching, swelling, or severe dizziness.  This medicine may not work as well in women who are very overweight. Speak to your doctor about any need to reduce weight.  Your ability to stay alert or to react quickly may be impaired by this medicine. Do not drive or operate machinery until you know how this medicine will affect you.  Please check with your doctor before drinking alcohol while on this medicine.  Avoid smoking while on this medicine. Smoking may increase your risk for stroke, heart attack, blood clots, high blood pressure, and other diseases of the heart and blood vessels.  Ask your doctor to show you how to perform a self breast examination. You should check your breasts once a month and report any changes to your doctor.  Talk to your doctor about getting a complete physical exam every year while on this medicine.  Check regularly to make sure you can feel the roxie under the skin. Contact your doctor right away if you can not feel it, or if it feels bent or broken.  Call the doctor if there are any signs of confusion or unusual changes in behavior.  Tell the doctor or pharmacist if you are pregnant, planning to be pregnant, or breastfeeding.  Do not use this  medicine if you are pregnant. If you become pregnant while on this medicine, contact your doctor immediately.  This medicine does not protect you or your partner against sexually transmitted diseases.  Do not take [place]'s wort while on this medicine.  Ask your pharmacist if this medicine can interact with any of your other medicines. Be sure to tell them about all the medicines you take.  Please tell all your doctors and dentists that you are on this medicine before they provide care.  Do not start or stop any other medicines without first speaking to your doctor or pharmacist.  Seek medical attention if you see any signs of a serious infection. These signs include pain, increasing redness or pus where this medicine is being used.  Side Effects  The following is a list of some common side effects from this medicine. Please speak with your doctor about what you should do if you experience these or other side effects.    acne    bloating    breast pain or swelling    dizziness    hair loss    headaches    high blood pressure    brown colored patches on the face    nausea    stomach upset or abdominal pain    vaginal bleeding or spotting between periods    vaginal itching or discharge    weight gain  Call your doctor or get medical help right away if you notice any of these more serious side effects:    increased risk of a blood clot    chest pain    confusion    coughing up blood or vomit that looks like coffee grounds    depression or feeling sad    fainting    severe or persistent headache    fast or irregular heart beats    jaw pain    sudden leg pain, swelling, warmth or redness    mood changes    shortness of breath    stomach pain    symptoms of stroke (such as one-sided weakness, slurred speech, confusion)    sweating    unusual or unexplained tiredness or weakness    dark urine    cramping of the uterus or bleeding from the vagina    blurring or changes of vision    pain, heat, swelling or redness at the  incision site    yellowing of the eyes    yellowing of the skin  A few people may have an allergic reactions to this medicine. Symptoms can include difficulty breathing, skin rash, itching, swelling, or severe dizziness. If you notice any of these symptoms, seek medical help quickly.  Extra  Please speak with your doctor, nurse, or pharmacist if you have any questions about this medicine.  [email or web address]  IMPORTANT NOTE: This document tells you briefly how to take your medicine, but it does not tell you all there is to know about it.Your doctor or pharmacist may give you other documents about your medicine. Please talk to them if you have any questions.Always follow their advice. There is a more complete description of this medicine available in English.Scan this code on your smartphone or tablet or use the web address below. You can also ask your pharmacist for a printout. If you have any questions, please ask your pharmacist.     2021 [employer].           Patient Education     Depo-Provera Injectable Suspension 400 mg/mL  Uses  This medicine is used for the following purposes:    endometriosis    hormone imbalance    menstrual problem    cancer  Instructions  This medicine will be given to you at the doctor's office.  This medicine may cause you to become more sensitive to the sun. Use sunscreen or wear protective clothing when you are exposed to the sun.  It may take several weeks for this medicine to fully work.  It is important that you keep taking each dose of this medicine on time even if you are feeling well.  Please tell your doctor and pharmacist about all the medicines you take. Include both prescription and over-the-counter medicines. Also tell them about any vitamins, herbal medicines, or anything else you take for your health.  If you have diabetes and use urine glucose tests, this medicine may cause incorrect results. Please check with your doctor before making  any changes to your diabetes treatment plan.  This medicine may affect the strength of your bones. If you have or are at increased risk for developing osteoporosis (weakening of the bones), your doctor may recommend adding foods containing calcium and vitamin D while on this medicine. Please talk to your doctor for more information.  You may need vitamin and mineral supplements while on this medicine. Please speak with your doctor or pharmacist.  It is very important that you follow your doctor's instructions for all blood tests.  This medicine may interfere with some lab test results. Be sure to tell all your healthcare providers that you are taking this medicine.  It is very important that you keep all appointments for medical exams and tests while on this medicine.  Cautions  Tell your doctor and pharmacist if you ever had an allergic reaction to a medicine. Symptoms of an allergic reaction can include trouble breathing, skin rash, itching, swelling, or severe dizziness.  Some patients with weak hearts may have worsening of symptoms. If you notice difficulty breathing, weight gain, or swelling of your legs or ankles, let your doctor know right away.  This medicine is associated with a rare but very serious medical condition. Please speak with your doctor about symptoms you should look out for while on this medicine. Notify your doctor immediately if you develop those symptoms.  Some patients taking this medicine have experienced serious side effects. Please speak with your doctor to understand the risks and benefits associated with this medicine.  This medicine is associated with an increased risk of serious heart problems, heart attack, and stroke. Please speak with your doctor about the risks and benefits of using this medicine. Contact your doctor immediately if you experience chest pain or difficulty breathing.  This medicine is associated with an increased risk for serious blood clots. Speak with your doctor  about the benefits and risks from using this medicine.  Please check with your doctor before drinking alcohol while on this medicine.  Avoid smoking while on this medicine. Smoking may increase your risk for stroke, heart attack, blood clots, high blood pressure, and other diseases of the heart and blood vessels.  Call the doctor if there are any signs of confusion or unusual changes in behavior.  Tell the doctor or pharmacist if you are pregnant, planning to be pregnant, or breastfeeding.  Do not use this medicine if you are pregnant. If you become pregnant while on this medicine, contact your doctor immediately.  Do not take [place]'s wort while on this medicine.  Ask your pharmacist if this medicine can interact with any of your other medicines. Be sure to tell them about all the medicines you take.  Please tell all your doctors and dentists that you are on this medicine before they provide care.  Do not start or stop any other medicines without first speaking to your doctor or pharmacist.  If you have had a heart attack or a stroke within the past 6 months, talk to your doctor before using this medicine.  Side Effects  The following is a list of some common side effects from this medicine. Please speak with your doctor about what you should do if you experience these or other side effects.    acne    bloating    breast pain or swelling    dizziness    feeling of heat or flushing    headaches    reaction at the area of the injection (pain, redness, swelling)    nausea    problems with sexual functions or desire    stomach pain    vaginal bleeding or spotting between periods    weight gain  Call your doctor or get medical help right away if you notice any of these more serious side effects:    increased risk of a blood clot    bone pain    breast lump or discharge    chest pain    changes in memory, mood, or thinking    depression or feeling sad    swelling of the legs, feet, and hands    fainting    severe or  persistent headache    sudden leg pain, swelling, warmth or redness    symptoms of liver damage (such as yellowing of skin or eyes, dark urine, unusual tiredness or weakness; severe stomach or back pain)    dark patches on skin or face    mood changes    seizures    shortness of breath    symptoms of stroke (such as one-sided weakness, slurred speech, confusion)    cramping of the uterus or bleeding from the vagina    blurring or changes of vision    severe or persistent vomiting    sudden or unexplained weight gain  A few people may have an allergic reactions to this medicine. Symptoms can include difficulty breathing, skin rash, itching, swelling, or severe dizziness. If you notice any of these symptoms, seek medical help quickly.  Extra  Please speak with your doctor, nurse, or pharmacist if you have any questions about this medicine.  [email or web address]  IMPORTANT NOTE: This document tells you briefly how to take your medicine, but it does not tell you all there is to know about it.Your doctor or pharmacist may give you other documents about your medicine. Please talk to them if you have any questions.Always follow their advice. There is a more complete description of this medicine available in English.Scan this code on your smartphone or tablet or use the web address below. You can also ask your pharmacist for a printout. If you have any questions, please ask your pharmacist.     2021 [employer].

## 2021-08-12 ASSESSMENT — ANXIETY QUESTIONNAIRES: GAD7 TOTAL SCORE: 11

## 2021-08-17 ENCOUNTER — VIRTUAL VISIT (OUTPATIENT)
Dept: PSYCHOLOGY | Facility: CLINIC | Age: 26
End: 2021-08-17
Payer: COMMERCIAL

## 2021-08-17 DIAGNOSIS — F64.0 GENDER DYSPHORIA IN ADOLESCENT AND ADULT: ICD-10-CM

## 2021-08-17 DIAGNOSIS — F41.1 GENERALIZED ANXIETY DISORDER: ICD-10-CM

## 2021-08-17 DIAGNOSIS — F33.1 MAJOR DEPRESSIVE DISORDER, RECURRENT EPISODE, MODERATE (H): Primary | ICD-10-CM

## 2021-08-17 PROCEDURE — 90791 PSYCH DIAGNOSTIC EVALUATION: CPT | Mod: 95 | Performed by: STUDENT IN AN ORGANIZED HEALTH CARE EDUCATION/TRAINING PROGRAM

## 2021-08-17 PROCEDURE — 99207 PR NO BILLABLE SERVICE THIS VISIT: CPT | Performed by: STUDENT IN AN ORGANIZED HEALTH CARE EDUCATION/TRAINING PROGRAM

## 2021-08-17 NOTE — PROGRESS NOTES
[facility]  [address] [place] [address] [address]  [place] [address]                                                                                Phone: [phone]                                                                                  Fax: [phone]                                                                    [email or web address]    ANNUAL UPDATE: Diagnostic Assessment Interview    Date of Service: 8/17/21  Client Name: Ramila Woods  YOB: 1995  26 year old  MRN:  [id number]  Gender/Gender Identity: Nonbinary  Treating Provider: Nabil Bahena, Ph.D.  Program:  TG  Type of Session: Assessment  Present in Session: Therapist, Client  Number of Minutes: 60    Video start time: 4:00pm  Video end time: 5:00pm    Telemedicine Visit: The patient's condition can be safely assessed and treated via synchronous audio and visual telemedicine encounter.      Reason for Telemedicine Visit: Services only offered telehealth    Originating Site (Patient Location): Patient's home    Distant Site (Provider Location): Provider Remote Setting- Home Office    Consent:  The patient/guardian has verbally consented to: the potential risks and benefits of telemedicine (video visit) versus in person care; bill my insurance or make self-payment for services provided; and responsibility for payment of non-covered services.     Mode of Communication:  Video Conference via [employer]    As the provider I attest to compliance with applicable laws and regulations related to telemedicine.     Updated Presenting Problem and Goals:  Client reported continuing therapy to work toward the following treatment goals: (1) emotionally work through transition-related issues (prepare for top surgery, letters, etc.), (2) improve communication in relationship (open discussion of boundaries, etc.), and (3) learn new skills to cope with feelings of gender dysphoria. They have been making  progress in their interpersonal relationships but struggling with symptoms of depression, anxiety, and gender dysphoria. Client will have top surgery on 9/17/2021 with Dr. Tan. Goals remain consistent with most recent treatment plan.    Updated Mental Health History:   Client reported symptom reduction associated with depression and anxiety. Client has been making steady progress toward improving coping skills. Client denied any current suicidal thoughts, noting that medication has helped alleviate past feelings of suicidality. Client also said that therapy has helped as well (having someone to talk to, some coping strategies, understand self a little more). Client noted that they last had active suicide ideation when they were 15, stopped having thoughts about not waking up when they were 22.    Updated Substance Use:   Client reported drinking alcohol occasionally, but denied use within the past year. Client denied using drugs.    Updated Medical History:   No significant medical concerns.     Updated Family History:   Client currently lives with their partner in an apartment on their own. Client reported that they lived with their mom and sister in [place], [place] until 2019. Mother and father have diagnoses of C-PTSD. Client has strained relationships with their father. Their little sister continues to have mental health concerns (diagnosis of BPD) and substance use. Client reported that their younger and older brother live in [place]. Client is out to their siblings and mother, but not to their father. All but older sister have struggled with substance use. Dad still lives in [place].    Updated Current Significant Relationship/Partner:  Client is in a committed relationship with their partner, Megan, for over a year and they live together. They report no major concerns with their relationship.    Updated Educational History:  Client earned an undergraduate degree in fine arts ([employer]) from  [employer].    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    Updated Legal Issues:  Client denied any legal issues.  ________________________________________________________________  CONCLUSIONS    Updated Strengths and Liabilities:   Client continues to get overwhelmed emotionally by stressors and triggers, but had developed strong coping strategies and DBT skills for emotion regulation (tapping, breathing deeply, and recognizing when strong emotions become more present, and verbally communicating their needs, engaging in physical activities). Client is able to follow through on life goals and plans even with significant mental health concerns which will continue to be helpful for them in attaining their goals related to therapy here (e.g., as related to gender).    Updated Symptoms:  Client experiences significant depressive symptomatology, such as low mood, loss of interest, feeling depressed or hopeless; anxious symptomatology, such as feeling anxiety or worry, fear or dread, restless physically. Client also experiences significant dysphoria as related to their chest.    Updated Mental Status:      Mental Status:   Appearance:  Casually dressed, Adequately groomed and Dressed appropriately for weather  Behavior/relationship to examiner/demeanor:  Cooperative  Orientation: Oriented to person, place, time and situation  Speech Rate:  Normal  Speech Spontaneity:  Normal  Mood:  friendly, comfortable and distraught  Affect:  Appropriate/mood-congruent  Thought Process (Associations):  Logical, Linear and Goal directed  Thought Content:  no evidence of suicidal or homicidal ideation  Abnormal Perception:  None  Attention/Concentration:  Normal  Language:  Intact  Insight:  Good  Judgment:  Good        Interactive Complexity:  Not applicable.    Updated DSM-5 Diagnoses:  296.32 (F33.1)  "Major Depressive Disorder, Recurrent, Moderate  302.85 (F64.0) Gender Dysphoria in adolescent and adult  300.02 (F41.1) Generalized Anxiety Disorder    Conclusions/Recommendations/Initial Treatment Goals:   The client is a 26 year old  person assigned female at birth who identifies as agender/non-binary. Based on the client's current report of symptoms, client continues to meet criteria for 296.32 (F33.1) Major Depressive Disorder, Recurrent, Moderate  302.85 (F64.0) Gender Dysphoria in adolescent and adult, and  300.02 (F41.1) Generalized Anxiety Disorder. The client's mental health concerns continue to affect client's ability to function and have been causing clinically significant distress. Client denies safety concerns. Based on the client's reported symptoms and impact on functioning, the plan for the patient is:  1. Continue supportive individual therapy with a provider specialized in transgender health and gender. Therapy should focus on supporting their goals around transitioning and helping them improve coping skills.  2. Continue care with a psychiatrist for medication management.   3. Coordinate care as needed with medical providers.       Nabil Bahena, PhD  Postdoctoral Fellow        _______________________________         TREATMENT PLAN  Date of Treatment Plan: 2021  Name: Ramila Woods \"Obie\"  : 1995  Medical Record Number: [id number]  Treating Provider: Nabil Bahena,  Type of Session: Individual  Present in Session: Nabil Ragland    Current Status:  Depression/Mood:  Sadness  Increased crying  Decreased interest  Decreased appetite  Sleep irregularities  Low energy  Low self-esteem/guilt  Irritability  Decreased concentration/indecision  Rumination  Dysphoria     Anxiety/Panic:  Worry/Anxiety  GI Distress  Social Fear  Dizziness  Physiological reactivity     Thought:   Distractible  Racing Thoughts     Sensorium:  Paranoia     Behavior/Health:  Increase in goal directed " activities  Occupational problems     Chemical Use:  None     Sexual Problems: (Note: client identifies as asexual)  Low Desire  Sexual Aversion     Gender concerns:  Gender dysphoria  Body dysphoria  Social dysphoria     Suicide Risk Assessment:  Assessed Level of Immediate Risk:  None  Ideation:  NO  Plan:  NO   Means:  NO  Intent:  NO     Homicide Risk Assessment:  Assessed Level of Immediate Risk:  None  Ideation:  NO  Plan:  NO  Means:  NO  Intent:  NO     Impact of Symptoms on Function:  Physical/Health  Social/Family  Work     DSM-5 Diagnoses:   302.6 (F64.0) Gender Dysphoria in adolescent and adult  300.02 (F41.1) Generalized Anxiety Disorder  296.32 (F33.1) Major Depressive Disorder, Recurrent, Moderate     Screening Questionnaires:  Completed the following screening questionnaires:  PHQ-9: Score = 11, indicating moderate depression.  LOVE-7: Score = 12, indicating moderate anxiety     Problem(s):  1. Persistent gender dysphoria   2. Symptoms of anxiety and depression  3. Relationship difficulties  4. Decreased desire in doing things     Short-Long Term Goals  (1) emotionally work through transition-related issues (prepare for top surgery, letters, etc.)  (2) improve communication in relationship (open discussion of boundaries, etc.)  (3) learn new skills to cope with feelings of gender dysphoria     Interventions  Emotion-Focused Techniques  Cognitive-Behavior Therapy     Expected Outcomes and Prognosis  Return to normal functioning     Frequency of Sessions  Weekly sessions for now, biweekly in June     Current Psychoactive Medications:  None  Discharge & Aftercare Goals: TBD  Care Coordination: No     Consent to Treatment:   Patient participated in this treatment planning process and indicated verbal agreement with the above treatment plan.  If patient doesn't sign, indicate why: Telehealth visit due to COVID19 pandemic

## 2021-08-31 ENCOUNTER — VIRTUAL VISIT (OUTPATIENT)
Dept: PSYCHOLOGY | Facility: CLINIC | Age: 26
End: 2021-08-31
Payer: COMMERCIAL

## 2021-08-31 ENCOUNTER — OFFICE VISIT (OUTPATIENT)
Dept: PLASTIC SURGERY | Facility: CLINIC | Age: 26
End: 2021-08-31
Payer: COMMERCIAL

## 2021-08-31 VITALS
WEIGHT: 144 LBS | HEART RATE: 62 BPM | BODY MASS INDEX: 23.14 KG/M2 | TEMPERATURE: 97.7 F | HEIGHT: 66 IN | SYSTOLIC BLOOD PRESSURE: 105 MMHG | DIASTOLIC BLOOD PRESSURE: 67 MMHG | OXYGEN SATURATION: 98 %

## 2021-08-31 DIAGNOSIS — F33.1 MAJOR DEPRESSIVE DISORDER, RECURRENT EPISODE, MODERATE (H): ICD-10-CM

## 2021-08-31 DIAGNOSIS — F64.0 GENDER DYSPHORIA IN ADULT: Primary | ICD-10-CM

## 2021-08-31 DIAGNOSIS — F41.1 GENERALIZED ANXIETY DISORDER: ICD-10-CM

## 2021-08-31 DIAGNOSIS — F64.0 GENDER DYSPHORIA IN ADOLESCENT AND ADULT: Primary | ICD-10-CM

## 2021-08-31 PROCEDURE — 90834 PSYTX W PT 45 MINUTES: CPT | Mod: 95 | Performed by: STUDENT IN AN ORGANIZED HEALTH CARE EDUCATION/TRAINING PROGRAM

## 2021-08-31 PROCEDURE — 99207 PR NO BILLABLE SERVICE THIS VISIT: CPT | Performed by: STUDENT IN AN ORGANIZED HEALTH CARE EDUCATION/TRAINING PROGRAM

## 2021-08-31 PROCEDURE — 99214 OFFICE O/P EST MOD 30 MIN: CPT | Performed by: SURGERY

## 2021-08-31 ASSESSMENT — PAIN SCALES - GENERAL: PAINLEVEL: MILD PAIN (2)

## 2021-08-31 ASSESSMENT — MIFFLIN-ST. JEOR: SCORE: 1404.93

## 2021-08-31 NOTE — PROGRESS NOTES
[facility] -  Case Progress Note    Date of Service: 21   Name: Ramila Barnes)  Gender Identity: Non-binary/Agender  Pronouns: They/them  : 1995  Medical Record Number: [id number]  Treating Provider: Nabil Bahena, PhD  Type of Session: Individual  Present in Session: Nabil Ragland  Number of Minutes: 43  Video start time: 5:00pm  Video end time: 5:43pm    Telemedicine Visit: The patient's condition can be safely assessed and treated via synchronous audio and visual telemedicine encounter.       Reason for Telemedicine Visit: Services only offered telehealth     Originating Site (Patient Location): Patient's home     Distant Site (Provider Location): Provider Remote Setting     Consent:  The patient/guardian has verbally consented to: the potential risks and benefits of telemedicine (video visit) versus in person care; bill my insurance or make self-payment for services provided; and responsibility for payment of non-covered services.      Mode of Communication:  Video Conference via [employer].     As the provider I attest to compliance with applicable laws and regulations related to telemedicine.    Health Maintenance Summary - Mental Health Treatment Plan       Status Date      MENTAL HEALTH TX PLAN Next Due 3/14/2022      Done 2021 [facility] MENTAL HEALTH TX PLAN SCAN     Done 3/5/2020 [facility] MENTAL HE[facility] TX PLAN SCAN        Current Symptoms/Status:  Client has an established history of experiencing gender dysphoria, psychological distress stemming from incongruence with gender identity and sex assigned at birth exacerbated by primary and secondary sex characteristics. Client reported experiencing the above-mentioned symptoms as well as several depressive symptoms (sadness, fatigue, low energy and interest in pleasurable activities, low motivation, sleep disturbance: difficulty stay asleep and waking up several times in the middle of the night). Client reports ongoing concerns  related to gender dysphoria and navigating interpersonal relationships. Client reported a history of family trauma related to substance use and addiction, difficulties being accepted as agender/non-binary by family, and navigating societal expectations of relationships.     Progress Toward Treatment Goals:   Client reported making progress in their interpersonal relationships but struggling with symptoms of depression, anxiety, and gender dysphoria. Client expressed commitment to working toward their treatment goals: (1) emotionally work through transition-related issues (prepare for top surgery, letters, etc.), (2) improve communication in relationship (open discussion of boundaries, etc.), and (3) learn new skills to cope with feelings of gender dysphoria. Client will have top surgery on 9/17/2021.    Intervention: Modality and Description:   Client arrived to session on time. They reported having their top surgery consult with Dr. Tan this morning. They reported experiencing gender dysphoria and struggling to find ways to be more visibly nonbinary, but that this has lessened due to euphoria from getting their upcoming surgery. They have been experiencing a reduction in symptom severity, particularly depression (sadness, fatigue, low energy and interest in pleasurable activities, low motivation, sleep disturbance: difficulty staying asleep and waking up several times in the middle of the night) and anxiety. The session focused on planning for their upcoming surgery and discussing their concerns about recovery post-op. We also focused on their relationship and working through trauma responses they have developed over time. Therapist provided support and validation and helped client think through preparation for surgery and recovery plans. Therapist used emotion-focused strategies and CBT to help client process concerns about gender and bodily dysphoria.     Response to Intervention:  Client was open and  "receptive to strategies and interventions used in session. Client stated they would like to continue working toward noticing their symptoms and communicating them to their psychiatrist. Client was open to developing more self-care strategies to promote their own wellness.     Assignment:  - Client will continue to work toward their preparation for surgery and post-op recovery plans.    Interactive Complexity:  Not applicable.    DSM-5 Diagnoses:  302.85 (F64.0) Gender Dysphoria in adolescent and adult  296.32 (F33.1) Major Depressive Disorder, Recurrent, Moderate  300.02 (F41.1) Generalized Anxiety Disorder    Plan/Need for Future Services:  Return for therapy in 2 weeks to treat diagnosed problems.        Nabil Bahena, PhD  Postdoctoral Fellow       _______________________________         TREATMENT PLAN  Date of Treatment Plan: 2021  Name: Ramila Woods \"Obie\"  : 1995  Medical Record Number: [id number]  Treating Provider: Nabil Bahena,  Type of Session: Individual  Present in Session: Nabil Ragland    Current Status:  Depression/Mood:  Sadness  Increased crying  Decreased interest  Decreased appetite  Sleep irregularities  Low energy  Low self-esteem/guilt  Irritability  Decreased concentration/indecision  Rumination  Dysphoria     Anxiety/Panic:  Worry/Anxiety  GI Distress  Social Fear  Dizziness  Physiological reactivity     Thought:   Distractible  Racing Thoughts     Sensorium:  Paranoia     Behavior/Health:  Increase in goal directed activities  Occupational problems     Chemical Use:  None     Sexual Problems: (Note: client identifies as asexual)  Low Desire  Sexual Aversion     Gender concerns:  Gender dysphoria  Body dysphoria  Social dysphoria     Suicide Risk Assessment:  Assessed Level of Immediate Risk:  None  Ideation:  NO  Plan:  NO   Means:  NO  Intent:  NO     Homicide Risk Assessment:  Assessed Level of Immediate Risk:  None  Ideation:  NO  Plan:  NO  Means:  NO  Intent:  NO     Impact " of Symptoms on Function:  Physical/Health  Social/Family  Work     DSM-5 Diagnoses:   302.6 (F64.0) Gender Dysphoria in adolescent and adult  300.02 (F41.1) Generalized Anxiety Disorder  296.32 (F33.1) Major Depressive Disorder, Recurrent, Moderate     Screening Questionnaires:  Completed the following screening questionnaires:  PHQ-9: Score = 11, indicating moderate depression.  LOVE-7: Score = 12, indicating moderate anxiety     Problem(s):  1. Persistent gender dysphoria   2. Symptoms of anxiety and depression  3. Relationship difficulties  4. Decreased desire in doing things     Short-Long Term Goals  (1) emotionally work through transition-related issues (prepare for top surgery, letters, etc.)  (2) improve communication in relationship (open discussion of boundaries, etc.)  (3) learn new skills to cope with feelings of gender dysphoria     Interventions  Emotion-Focused Techniques  Cognitive-Behavior Therapy     Expected Outcomes and Prognosis  Return to normal functioning     Frequency of Sessions  Weekly sessions for now, biweekly in June     Current Psychoactive Medications:  None  Discharge & Aftercare Goals: TBD  Care Coordination: No     Consent to Treatment:   Patient participated in this treatment planning process and indicated verbal agreement with the above treatment plan.  If patient doesn't sign, indicate why: Telehealth visit due to COVID19 pandemic

## 2021-08-31 NOTE — PATIENT INSTRUCTIONS
Bilateral Mastectomy   Pre and Post op Surgery Instructions    You are scheduled for Top Surgery with  nipple grafts on 9/17 at 8:25 AM    You will need to arrive at 6:50AM [place] [address]. You can park in the lot  Or [facility].    - Please make sure you have someone to drive you home after your surgery and you will need someone to stay with you at home 24 hours after your surgery.    The surgery will be about 3-4 hours long. You will be in recovery afterwards for about 1-2 hours.     Before Surgery:  - 8 hours prior to surgery stop eating solid food. You can continue to drink clear liquids (water, apple juice, Gatorade) up to 2 hours before surgery. If you have any medications that need to be taken in this timeframe, you can take them with a small sip of water    - No Ibuprofen, Advil, Aleve, Naproxen, Motrin, Aspirin for 7 days prior to surgery. If you are having pain in the week before surgery Tylenol is okay to take.    - Wear or bring a button up or zip up shirt with you to the hospital.    - Wash with surgical soap:    Showering with an antiseptic soap prior to an invasive procedure will decrease the  bacteria that is normally found on the skin.   Using 1/2 of the bottle for each application.  Be sure to use the whole bottle before coming to surgery.  The evening before surgery, shower or bathe as you normally would, using your preferred soap.  Give special attention to areas where the incisions will be made. Rinse thoroughly.  You may wash your hair with your regular shampoo.   Next wash your entire body, from the chin down, with the special antiseptic soap (full strength) using a freshly laundered clean washcloth, again giving special attention to areas where incisions will be made.  Rinse thoroughly and dry off using a freshly laundered clean towel.   Wear clean clothes/pajamas and sleep on clean sheets  Repeat steps 2 and 3 in the morning before coming to surgery  (using the rest of the bottle of soap).     **If you have a reaction to the surgical soap, let the nurse know.     Events of day:     -Check in on the 3rd floor of the hospital for your surgery.    Pre-op     - After you check in, you will meet with a pre-op nurse. They will go over your medications, review your H&P (pre-op physical), have you change into a paper gown, and your chest will be wiped again with soap.    - They will place compression boots on both legs to help decrease risk of blood clots.     - You may be asked to complete a pregnancy test. If you have had no exposure to sperm, you can decline.    - You will meet with the anesthesiologists and nurse anesthetist who will be keeping you asleep during surgery, they will be placing an IV, and will be monitoring you throughout the surgery.    - You will meet with the RN as you are being moved into operating room, they will be helping to position you and keep you comfortable during the surgery.     - Dr. Tan will meet you in the pre-op area to discuss any questions about surgery, make markings on your chest for planning, and to sign your consent.     Intra-op (OR)    - A catheter will be placed in your bladder after you are sleeping and is typically removed before you wake up. The longest this would typically be in is in the post-op recovery room if needed.     - There will be straps and pillows to help position you and keep you in place during the surgery.    - The tissue that is removed will be sent to pathology to be analyzed and then discarded.     - ORALIA drains will be placed (one in each armpit) and a pain catheter will be placed in the center of your chest.     - Closure is done with dissolvable sutures under the skin and is then sealed with glue, and tape.    Post-op/Recovery    - You can usually go home the same day so long as you are eating, drinking, voiding, and pain is well controlled.  You will be sent home with all needed supplies.     -  "Post-Op medications you will be given in addition to the anesthesia include: Zofran (nausea), Oxycodone (pain), Z-mecca (infection), and antipruritic (itching).     - You will leave the hospitals with an ace bandage wrapped around your chest for compression. You may keep the ace bandage on until you come back for your one week post op visit or you may adjust the wrap as needed if it is either too tight or too loose.     Post-Op Cares:     - No lifting over 5 lbs for 3 weeks after surgery    - No stretching arms out or above the head (\"modified T-mre\")    - Walk around frequently to help prevent blood clots    - Do deep breathing exercises to prevent pneumonia    - No sleeping on stomach x 3 weeks after surgery, if it is difficult not to roll over onto your stomach you can sleep in a recliner or use additional pillows    - Do not use any ice packs or heat packs    - Preventing constipation will be your responsibility. You can use whatever method works best for you such as; aloe, prune juice, Sennokot or Miralax.  No showering in the first week after your surgery.     What to expect at your 1st post op visit:    When you come in for your 1st post op visit, we will assess the output of your drains and remove the pain catheter (On-Q) that was placed on your chest.     -Please remember to bring the documentations of your output with you to your appointment so we can review how much your drains have been putting out since your surgery.     -We will remove the bolsters that was placed on your nipples and teach you how to perform nipple graft dressings.     -You will be given supplies to take home and will need to continue wearing the ace wrap compression for another week after the drains are removed.       Nipple Care:   Change nipple dressings daily.  Take dressings off prior to showering and reapply after showering.  No direct shower spray on nipple grafts for 4 weeks.     Cut vaseline gauze to nipple size and place over " nipple.  Place folded white gauze over vaseline gauze and cover with plastic dressing.  Do dressing changes for 1 more week.  If you continue to have drainage, continue the dressings until drainage stops.       Drain Care:  You will be discharged with Dandre-Bowman (ORALIA) drainage tubes.  These tubes drain fluid from your incision, helping to prevent swelling and reducing the risk for infection.  The tube is held in place by a few stitches. Pin your ORALIA drain to your clothing by using a safety pin through the plastic loop on the top of the bulb. If the drain is not attached to your clothing, it may pull out from under your skin. Drains are uncomfortable!    Emptying the drain bulb: The measuring cup provided by the hospital or a measuring cup that is used only for the ORALIA drain.  Wash your hands with soap and water.  Hold the bulb securely.  Remove the drainage plug from the emptying port.  Carefully turn the bulb upside down over the measuring cup, and gently squeeze all of the drainage into the measuring cup.  Squeeze the middle of the bulb firmly so that the bulb is as flat as possible.                                                                                                                                                    While still squeezing the bulb, replace the drainage plug. This step is important to keep the drain suction  Measure how much fluid you removed from the bulb.  Write down the amount and color of the fluid you removed from the bulb. If  you have more than one drain, keep a separate record for each one.  Empty the fluid into the toilet and flush.  Rinse the measuring cup, and wash your hands with soap and water.  You should empty the drain at least two times each day, in the morning and at bedtime.  Drainage tubes are removed when the output is less than 20ml in a 24 hour period for 2 consecutive days.  Output will be somewhere between 30 to 50 mLs per day, but don't be alarmed if it is more  or less. Output may not be equal between sides. Amounts will probably decrease over time.  The color coming from the drains may vary from deep red, light pink, or clear yellow.    Stripping the tube:  To prevent clots from blocking the drain, you will need to  strip  it. Stripping means that you use your fingers to squeeze along the length of the drain to help maintain the flow of drainage.  Wash your hands.  Using one hand, firmly hold the tubing near the insertion site (as close to your skin as the ace wrap and gauze dressing will allow). This will prevent the drain from being pulled out while you are stripping it and from pulling on skin and sutures.  [place] upper/top fingers and milk with the bottom or lower fingers.  Using your index finger and thumb of the other hand, squeeze the tubing below the  first hand. You should squeeze it firmly enough so the tubing becomes flat.   Maintain pressure on the tube, as you are squeezing, slide your index finger and thumb down the tube about 4-6 inches toward the bulb. (use alcohol wipes or lotion to help).  Then, release the hand closest to where the tube is attached to the body (making sure to keep pressure with the other hand), and move upper/anchor hand down. Squeeze, Repeat in segments.  Do not release the pressure you are creating in the tubing until you reach the bulb.  The whole tube will be flat.   If at any time it feels like the tube is pulling away from the skin or starts to hurt STOP! Then start over again more gently.   Strip the drain each time you empty it.

## 2021-08-31 NOTE — NURSING NOTE
"Chief Complaint   Patient presents with     RECHECK     Obie, is being seen today for a Pre-op DOS 9/17.       Vitals:    08/31/21 1000   BP: 105/67   BP Location: Left arm   Patient Position: Chair   Cuff Size: Adult Regular   Pulse: 62   Temp: 97.7  F (36.5  C)   TempSrc: Oral   SpO2: 98%   Weight: 65.3 kg (144 lb)   Height: 1.676 m (5' 6\")       Body mass index is 23.24 kg/m .      Jennifer Watson LPN    "

## 2021-08-31 NOTE — PROGRESS NOTES
"PLASTICS PRE-OP/ patient transfer  This patient is a previous patient of Dr. Gaines. They were then a patient of Dr. Levine, but they are now seeing me to actually do their surgery.     Patient is not currently on testosterone but considering low dose gel. Their therapist is Nabil Bahena. They have been seeing this therapist for over a year and they have written the patient a letter of support. They occasionally bind with GC2B. State that they had a \"water cyst\" in their right breast as a teenager. They have been out for 2-3 years.     Medical Hx  Chronic pain in neck and shoulders. Repetitive motion as an artist.  Hyperactive vocal chord. Will plan to discuss this with anesthesia.   Depression, anxiety and PTSD, managed by medication and therapy.     Surgical Hx:  3rd molar extraction - no complications with sedation.     Family hx:  Maternal aunt had breast CA.     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 Lives with girlfriend who will be taking care of the patient postop. Girlfriend took time off 1 week postop. Has a cat. Rare alcohol. Nonsmoker. Diet: Omnivore, no restrictions. Occasional tea. No soda. Exercise: calisthenics, Stretching. Archery. Sleep: Averages 6-9hrs Qnight with Trazadone.     PE  Height: 5'6\" Weight: 144 lbs  Nice upper chest contour.   Grade 1.5 nipple ptosis.   Left chest more prominent than left. Attributed to archery.   Right breast is slightly larger than the left. Both breasts about 200-300g. Left nipple higher than right by 1 cm  IMFs are about 3-4cm below the pec muscle.   R IMF is about 1cm lower than the R.   No lateral thoracic rolls or anterior axillary folds.  Fibrous breast tissue.   No lymphadenopathy or masses.   Photos taken with consent.     Discussed how they stretch to help alleviate their neck pain but they probably will not be able to in the first weeks postop. Advised to ask their girlfriend for some manual stretching/massage.     This is a 26 year old " biological female who identifies as non-binary who presents for their pre-op visit prior to bilateral simple mastectomy with nipple graft reconstruction scheduled for 9/17/21. They are here today with by themselves. The patient has had a negative mammogram (12/30/20), and their letter of support from Nabil Bahena has been received. History and physical scheduled for 9/14. Covid test scheduled for 9/14.     My LPN, Jennifer , discussed periop instructions with the patient including: not eating anything 8 hours prior to surgery, drinking clear liquids up to 2 hours before surgery except for morning medications with a sip of water, the preop shower with surgical soap which was given, and wearing a button- or zip-up shirt on the day of surgery. She also instructed the patient to avoid NSAIDs x 1 week both before AND after surgery, but they may take Tylenol post-op for pain as needed. She also gave the patient a folder with information on amparo-op topics, including where the surgery will be.     I discussed the following with the patient; preop, intraop and postop phases of care on the day of surgery, the placement of a bladder catheter during surgery that will likely be removed in recovery, postop cares and limitations with relation to home and work settings, 5-lb weight restriction for the first 3 weeks postop, and how long to maintain limited activities. We also discussed Zofran, oxycodone, Z-mecca, and antipruritics which will be prescribed. We discussed that preventing constipation will be their responsibility, and we discussed methods such as aloe, prune juice or Miralax.     In addition, we went over the possible risks and complications involved with this elective procedure. These include but are not limited to: infection, bleeding, hematoma/seroma formation, and poor healing (including dehiscence, nipple graft loss, or hypertrophic scarring). We also discussed the possibility of altered chest sensation (either hypo or  hypersensitive), residual deformities and asymmetries, possible further surgical revisions, and possible injury to surrounding neurovascular and musculoskeletal structures, including intra-axillary or intra-thoracic. We lastly discussed anesthetic risks including DVT/PE or cardiopulmonary events.     Total time = 30 minutes, spent on the date of encounter doing chart review, history and physical, dressing changes, documentation, patient education, and any further activity as noted above.     This note was prepared on behalf of Radha Tan MD by Komal Paez, a trained medical scribe, based on my observations and the provider's statements to me.

## 2021-08-31 NOTE — LETTER
"8/31/2021       RE: Ramila Woods  [address]  [address]  [facility] [address]     Dear Colleague,    Thank you for referring your patient, Ramila Woods, to the [facility] [place] at [facility]. Please see a copy of my visit note below.    PLASTICS PRE-OP/ patient transfer  This patient is a previous patient of Dr. Gaines. They were then a patient of Dr. Levine, but they are now seeing me to actually do their surgery.     Patient is not currently on testosterone but considering low dose gel. Their therapist is Nabil Bahena. They have been seeing this therapist for over a year and they have written the patient a letter of support. They occasionally bind with GC2B. State that they had a \"water cyst\" in their right breast as a teenager. They have been out for 2-3 years.     Medical Hx  Chronic pain in neck and shoulders. Repetitive motion as an artist.  Hyperactive vocal chord. Will plan to discuss this with anesthesia.   Depression, anxiety and PTSD, managed by medication and therapy.     Surgical Hx:  3rd molar extraction - no complications with sedation.     Family hx:  Maternal aunt had breast CA.     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 Girlfriend took time off 1 week postop. Has a cat. Rare alcohol. Nonsmoker. Diet: Omnivore, no restrictions. Occasional tea. No soda. Exercise: calisthenics, Stretching. Archery. Sleep: Averages 6-9hrs Qnight with Trazadone.     PE  Height: 5'6\" Weight: 144 lbs  Nice upper chest contour.   Grade 1.5 nipple ptosis.   Left chest more prominent than left. Attributed to archery.   Right breast is slightly larger than the left. Both breasts about 200-300g. Left nipple higher than right by 1 cm  IMFs are about 3-4cm below the pec muscle.   R IMF is about 1cm lower than the R.   No lateral thoracic " rolls or anterior axillary folds.  Fibrous breast tissue.   No lymphadenopathy or masses.   Photos taken with consent.     Discussed how they stretch to help alleviate their neck pain but they probably will not be able to in the first weeks postop. Advised to ask their girlfriend for some manual stretching/massage.     This is a 26 year old biological female who identifies as non-binary who presents for their pre-op visit prior to bilateral simple mastectomy with nipple graft reconstruction scheduled for 9/17/21. They are here today with by themselves. The patient has had a negative mammogram (12/30/20), and their letter of support from Nabil Bahena has been received. History and physical scheduled for 9/14. Covid test scheduled for 9/14.     My LPN, Jennifer , discussed periop instructions with the patient including: not eating anything 8 hours prior to surgery, drinking clear liquids up to 2 hours before surgery except for morning medications with a sip of water, the preop shower with surgical soap which was given, and wearing a button- or zip-up shirt on the day of surgery. She also instructed the patient to avoid NSAIDs x 1 week both before AND after surgery, but they may take Tylenol post-op for pain as needed. She also gave the patient a folder with information on amparo-op topics, including where the surgery will be.     I discussed the following with the patient; preop, intraop and postop phases of care on the day of surgery, the placement of a bladder catheter during surgery that will likely be removed in recovery, postop cares and limitations with relation to home and work settings, 5-lb weight restriction for the first 3 weeks postop, and how long to maintain limited activities. We also discussed Zofran, oxycodone, Z-mecca, and antipruritics which will be prescribed. We discussed that preventing constipation will be their responsibility, and we discussed methods such as aloe, prune juice or Miralax.     In  addition, we went over the possible risks and complications involved with this elective procedure. These include but are not limited to: infection, bleeding, hematoma/seroma formation, and poor healing (including dehiscence, nipple graft loss, or hypertrophic scarring). We also discussed the possibility of altered chest sensation (either hypo or hypersensitive), residual deformities and asymmetries, possible further surgical revisions, and possible injury to surrounding neurovascular and musculoskeletal structures, including intra-axillary or intra-thoracic. We lastly discussed anesthetic risks including DVT/PE or cardiopulmonary events.     Total time = 30 minutes, spent on the date of encounter doing chart review, history and physical, dressing changes, documentation, patient education, and any further activity as noted above.     This note was prepared on behalf of Radha Tan MD by Komal Paez, a trained medical scribe, based on my observations and the provider's statements to me.       Again, thank you for allowing me to participate in the care of your patient.      Sincerely,    Radha Tan MD

## 2021-09-04 ENCOUNTER — HEALTH MAINTENANCE LETTER (OUTPATIENT)
Age: 26
End: 2021-09-04

## 2021-09-14 ENCOUNTER — TRANSFERRED RECORDS (OUTPATIENT)
Dept: HEALTH INFORMATION MANAGEMENT | Facility: CLINIC | Age: 26
End: 2021-09-14

## 2021-09-16 ENCOUNTER — ANESTHESIA EVENT (OUTPATIENT)
Dept: SURGERY | Facility: AMBULATORY SURGERY CENTER | Age: 26
End: 2021-09-16
Payer: COMMERCIAL

## 2021-09-17 ENCOUNTER — ANESTHESIA (OUTPATIENT)
Dept: SURGERY | Facility: AMBULATORY SURGERY CENTER | Age: 26
End: 2021-09-17
Payer: COMMERCIAL

## 2021-09-17 ENCOUNTER — HOSPITAL ENCOUNTER (OUTPATIENT)
Facility: AMBULATORY SURGERY CENTER | Age: 26
End: 2021-09-17
Attending: SURGERY | Admitting: SURGERY
Payer: COMMERCIAL

## 2021-09-17 VITALS
HEART RATE: 80 BPM | TEMPERATURE: 98 F | BODY MASS INDEX: 23.14 KG/M2 | DIASTOLIC BLOOD PRESSURE: 66 MMHG | RESPIRATION RATE: 16 BRPM | OXYGEN SATURATION: 97 % | HEIGHT: 66 IN | SYSTOLIC BLOOD PRESSURE: 114 MMHG | WEIGHT: 144 LBS

## 2021-09-17 DIAGNOSIS — F64.0 GENDER DYSPHORIA IN ADOLESCENT AND ADULT: ICD-10-CM

## 2021-09-17 LAB
HCG UR QL: NEGATIVE
INTERNAL QC OK POCT: NORMAL

## 2021-09-17 PROCEDURE — 19350 NIPPLE/AREOLA RECONSTRUCTION: CPT | Mod: 50

## 2021-09-17 PROCEDURE — 19303 MAST SIMPLE COMPLETE: CPT | Mod: 50

## 2021-09-17 PROCEDURE — 88304 TISSUE EXAM BY PATHOLOGIST: CPT | Mod: TC | Performed by: SURGERY

## 2021-09-17 PROCEDURE — 81025 URINE PREGNANCY TEST: CPT | Performed by: PATHOLOGY

## 2021-09-17 PROCEDURE — 19303 MAST SIMPLE COMPLETE: CPT | Mod: 50 | Performed by: SURGERY

## 2021-09-17 PROCEDURE — 19350 NIPPLE/AREOLA RECONSTRUCTION: CPT | Mod: 50 | Performed by: SURGERY

## 2021-09-17 RX ORDER — FENTANYL CITRATE 50 UG/ML
INJECTION, SOLUTION INTRAMUSCULAR; INTRAVENOUS PRN
Status: DISCONTINUED | OUTPATIENT
Start: 2021-09-17 | End: 2021-09-17

## 2021-09-17 RX ORDER — LIDOCAINE 40 MG/G
CREAM TOPICAL
Status: DISCONTINUED | OUTPATIENT
Start: 2021-09-17 | End: 2021-09-18 | Stop reason: HOSPADM

## 2021-09-17 RX ORDER — SODIUM CHLORIDE, SODIUM LACTATE, POTASSIUM CHLORIDE, CALCIUM CHLORIDE 600; 310; 30; 20 MG/100ML; MG/100ML; MG/100ML; MG/100ML
INJECTION, SOLUTION INTRAVENOUS CONTINUOUS
Status: DISCONTINUED | OUTPATIENT
Start: 2021-09-17 | End: 2021-09-18 | Stop reason: HOSPADM

## 2021-09-17 RX ORDER — ACETAMINOPHEN 325 MG/1
975 TABLET ORAL ONCE
Status: COMPLETED | OUTPATIENT
Start: 2021-09-17 | End: 2021-09-17

## 2021-09-17 RX ORDER — ONDANSETRON 2 MG/ML
4 INJECTION INTRAMUSCULAR; INTRAVENOUS EVERY 30 MIN PRN
Status: DISCONTINUED | OUTPATIENT
Start: 2021-09-17 | End: 2021-09-18 | Stop reason: HOSPADM

## 2021-09-17 RX ORDER — KETOROLAC TROMETHAMINE 30 MG/ML
INJECTION, SOLUTION INTRAMUSCULAR; INTRAVENOUS PRN
Status: DISCONTINUED | OUTPATIENT
Start: 2021-09-17 | End: 2021-09-17

## 2021-09-17 RX ORDER — OXYCODONE HYDROCHLORIDE 5 MG/1
5 TABLET ORAL EVERY 6 HOURS PRN
Qty: 13 TABLET | Refills: 0 | Status: SHIPPED | OUTPATIENT
Start: 2021-09-17 | End: 2021-09-20

## 2021-09-17 RX ORDER — KETOROLAC TROMETHAMINE 30 MG/ML
15 INJECTION, SOLUTION INTRAMUSCULAR; INTRAVENOUS EVERY 6 HOURS PRN
Status: DISCONTINUED | OUTPATIENT
Start: 2021-09-17 | End: 2021-09-18 | Stop reason: HOSPADM

## 2021-09-17 RX ORDER — CEFAZOLIN SODIUM 2 G/50ML
2 SOLUTION INTRAVENOUS
Status: COMPLETED | OUTPATIENT
Start: 2021-09-17 | End: 2021-09-17

## 2021-09-17 RX ORDER — GABAPENTIN 300 MG/1
300 CAPSULE ORAL
Status: COMPLETED | OUTPATIENT
Start: 2021-09-17 | End: 2021-09-17

## 2021-09-17 RX ORDER — GINSENG 100 MG
CAPSULE ORAL PRN
Status: DISCONTINUED | OUTPATIENT
Start: 2021-09-17 | End: 2021-09-17 | Stop reason: HOSPADM

## 2021-09-17 RX ORDER — MEPERIDINE HYDROCHLORIDE 25 MG/ML
12.5 INJECTION INTRAMUSCULAR; INTRAVENOUS; SUBCUTANEOUS
Status: DISCONTINUED | OUTPATIENT
Start: 2021-09-17 | End: 2021-09-18 | Stop reason: HOSPADM

## 2021-09-17 RX ORDER — OXYCODONE HYDROCHLORIDE 5 MG/1
5 TABLET ORAL EVERY 4 HOURS PRN
Status: DISCONTINUED | OUTPATIENT
Start: 2021-09-17 | End: 2021-09-18 | Stop reason: HOSPADM

## 2021-09-17 RX ORDER — FENTANYL CITRATE 50 UG/ML
25 INJECTION, SOLUTION INTRAMUSCULAR; INTRAVENOUS EVERY 5 MIN PRN
Status: DISCONTINUED | OUTPATIENT
Start: 2021-09-17 | End: 2021-09-18 | Stop reason: HOSPADM

## 2021-09-17 RX ORDER — LIDOCAINE HYDROCHLORIDE 20 MG/ML
INJECTION, SOLUTION INFILTRATION; PERINEURAL PRN
Status: DISCONTINUED | OUTPATIENT
Start: 2021-09-17 | End: 2021-09-17

## 2021-09-17 RX ORDER — ONDANSETRON 2 MG/ML
INJECTION INTRAMUSCULAR; INTRAVENOUS PRN
Status: DISCONTINUED | OUTPATIENT
Start: 2021-09-17 | End: 2021-09-17

## 2021-09-17 RX ORDER — GLYCOPYRROLATE 0.2 MG/ML
INJECTION, SOLUTION INTRAMUSCULAR; INTRAVENOUS PRN
Status: DISCONTINUED | OUTPATIENT
Start: 2021-09-17 | End: 2021-09-17

## 2021-09-17 RX ORDER — ONDANSETRON 4 MG/1
4 TABLET, ORALLY DISINTEGRATING ORAL EVERY 30 MIN PRN
Status: DISCONTINUED | OUTPATIENT
Start: 2021-09-17 | End: 2021-09-18 | Stop reason: HOSPADM

## 2021-09-17 RX ORDER — CEFAZOLIN SODIUM 2 G/50ML
2 SOLUTION INTRAVENOUS SEE ADMIN INSTRUCTIONS
Status: DISCONTINUED | OUTPATIENT
Start: 2021-09-17 | End: 2021-09-18 | Stop reason: HOSPADM

## 2021-09-17 RX ORDER — HYDROXYZINE HYDROCHLORIDE 25 MG/1
25 TABLET, FILM COATED ORAL 3 TIMES DAILY PRN
Qty: 12 TABLET | Refills: 0 | Status: SHIPPED | OUTPATIENT
Start: 2021-09-17 | End: 2022-05-02

## 2021-09-17 RX ORDER — AZITHROMYCIN 250 MG/1
TABLET, FILM COATED ORAL
Qty: 6 TABLET | Refills: 0 | Status: SHIPPED | OUTPATIENT
Start: 2021-09-17 | End: 2021-09-22

## 2021-09-17 RX ORDER — KETAMINE HYDROCHLORIDE 10 MG/ML
INJECTION, SOLUTION INTRAMUSCULAR; INTRAVENOUS PRN
Status: DISCONTINUED | OUTPATIENT
Start: 2021-09-17 | End: 2021-09-17

## 2021-09-17 RX ORDER — ONDANSETRON 4 MG/1
4 TABLET, ORALLY DISINTEGRATING ORAL EVERY 8 HOURS PRN
Qty: 12 TABLET | Refills: 0 | Status: SHIPPED | OUTPATIENT
Start: 2021-09-17 | End: 2022-05-02

## 2021-09-17 RX ORDER — DEXAMETHASONE SODIUM PHOSPHATE 4 MG/ML
INJECTION, SOLUTION INTRA-ARTICULAR; INTRALESIONAL; INTRAMUSCULAR; INTRAVENOUS; SOFT TISSUE PRN
Status: DISCONTINUED | OUTPATIENT
Start: 2021-09-17 | End: 2021-09-17

## 2021-09-17 RX ORDER — PROPOFOL 10 MG/ML
INJECTION, EMULSION INTRAVENOUS PRN
Status: DISCONTINUED | OUTPATIENT
Start: 2021-09-17 | End: 2021-09-17

## 2021-09-17 RX ORDER — PROPOFOL 10 MG/ML
INJECTION, EMULSION INTRAVENOUS CONTINUOUS PRN
Status: DISCONTINUED | OUTPATIENT
Start: 2021-09-17 | End: 2021-09-17

## 2021-09-17 RX ADMIN — FENTANYL CITRATE 25 MCG: 50 INJECTION, SOLUTION INTRAMUSCULAR; INTRAVENOUS at 12:22

## 2021-09-17 RX ADMIN — FENTANYL CITRATE 25 MCG: 50 INJECTION, SOLUTION INTRAMUSCULAR; INTRAVENOUS at 12:15

## 2021-09-17 RX ADMIN — PROPOFOL 125 MCG/KG/MIN: 10 INJECTION, EMULSION INTRAVENOUS at 09:21

## 2021-09-17 RX ADMIN — GABAPENTIN 300 MG: 300 CAPSULE ORAL at 07:42

## 2021-09-17 RX ADMIN — KETAMINE HYDROCHLORIDE 20 MG: 10 INJECTION, SOLUTION INTRAMUSCULAR; INTRAVENOUS at 09:21

## 2021-09-17 RX ADMIN — FENTANYL CITRATE 25 MCG: 50 INJECTION, SOLUTION INTRAMUSCULAR; INTRAVENOUS at 12:07

## 2021-09-17 RX ADMIN — Medication 100 MCG: at 08:39

## 2021-09-17 RX ADMIN — DEXAMETHASONE SODIUM PHOSPHATE 4 MG: 4 INJECTION, SOLUTION INTRA-ARTICULAR; INTRALESIONAL; INTRAMUSCULAR; INTRAVENOUS; SOFT TISSUE at 08:30

## 2021-09-17 RX ADMIN — CEFAZOLIN SODIUM 2 G: 2 SOLUTION INTRAVENOUS at 08:35

## 2021-09-17 RX ADMIN — ONDANSETRON 4 MG: 2 INJECTION INTRAMUSCULAR; INTRAVENOUS at 10:30

## 2021-09-17 RX ADMIN — PROPOFOL 200 MCG/KG/MIN: 10 INJECTION, EMULSION INTRAVENOUS at 08:30

## 2021-09-17 RX ADMIN — Medication 50 MCG: at 08:53

## 2021-09-17 RX ADMIN — LIDOCAINE HYDROCHLORIDE 60 MG: 20 INJECTION, SOLUTION INFILTRATION; PERINEURAL at 08:30

## 2021-09-17 RX ADMIN — GLYCOPYRROLATE 0.2 MG: 0.2 INJECTION, SOLUTION INTRAMUSCULAR; INTRAVENOUS at 09:33

## 2021-09-17 RX ADMIN — PROPOFOL 125 MCG/KG/MIN: 10 INJECTION, EMULSION INTRAVENOUS at 10:28

## 2021-09-17 RX ADMIN — OXYCODONE HYDROCHLORIDE 5 MG: 5 TABLET ORAL at 12:07

## 2021-09-17 RX ADMIN — SODIUM CHLORIDE, SODIUM LACTATE, POTASSIUM CHLORIDE, CALCIUM CHLORIDE: 600; 310; 30; 20 INJECTION, SOLUTION INTRAVENOUS at 08:23

## 2021-09-17 RX ADMIN — ONDANSETRON 4 MG: 2 INJECTION INTRAMUSCULAR; INTRAVENOUS at 13:07

## 2021-09-17 RX ADMIN — PROPOFOL 70 MG: 10 INJECTION, EMULSION INTRAVENOUS at 11:31

## 2021-09-17 RX ADMIN — PROPOFOL 200 MG: 10 INJECTION, EMULSION INTRAVENOUS at 08:30

## 2021-09-17 RX ADMIN — KETOROLAC TROMETHAMINE 15 MG: 30 INJECTION, SOLUTION INTRAMUSCULAR; INTRAVENOUS at 11:07

## 2021-09-17 RX ADMIN — FENTANYL CITRATE 50 MCG: 50 INJECTION, SOLUTION INTRAMUSCULAR; INTRAVENOUS at 08:30

## 2021-09-17 RX ADMIN — Medication 0.5 MG: at 09:49

## 2021-09-17 RX ADMIN — Medication 0.5 MG: at 09:06

## 2021-09-17 RX ADMIN — FENTANYL CITRATE 50 MCG: 50 INJECTION, SOLUTION INTRAMUSCULAR; INTRAVENOUS at 09:02

## 2021-09-17 RX ADMIN — Medication 50 MCG: at 08:47

## 2021-09-17 RX ADMIN — ACETAMINOPHEN 975 MG: 325 TABLET ORAL at 07:42

## 2021-09-17 ASSESSMENT — MIFFLIN-ST. JEOR: SCORE: 1404.93

## 2021-09-17 NOTE — BRIEF OP NOTE
[facility]    Brief Operative Note    Pre-operative diagnosis: Gender dysphoria in adolescent and adult [F64.0]  Post-operative diagnosis Same as pre-operative diagnosis    Procedure: Procedure(s):  MASTECTOMY, BILATERAL, SIMPLE, nipple grafts. OnQ.  Surgeon: Surgeon(s) and Role:     * Radha Tan MD - Primary     * Candace Fields PA-C - Assisting  Anesthesia: General   Estimated blood loss: 25ml  Drains: Dandre-Bowman and On-Q pump  Specimens:   ID Type Source Tests Collected by Time Destination   1 : right chest wall  Tissue Chest SURGICAL PATHOLOGY EXAM Radha Tan MD 9/17/2021  9:04 AM    2 : left breast tissue  Tissue Breast, Left SURGICAL PATHOLOGY EXAM Radha Tan MD 9/17/2021  9:13 AM    3 : right breast tissue  Tissue Breast, Right SURGICAL PATHOLOGY EXAM Radha Tan MD 9/17/2021  9:14 AM      Findings:   None.  Complications: None.  Implants: * No implants in log *

## 2021-09-17 NOTE — ANESTHESIA POSTPROCEDURE EVALUATION
Patient: Ramila Woods    Procedure(s):  MASTECTOMY, BILATERAL, SIMPLE, nipple grafts. OnQ.    Diagnosis:Gender dysphoria in adolescent and adult [F64.0]  Diagnosis Additional Information: No value filed.    Anesthesia Type:  General    Note:  Disposition: Outpatient   Postop Pain Control: Uneventful            Sign Out: Well controlled pain   PONV: No   Neuro/Psych: Uneventful            Sign Out: Acceptable/Baseline neuro status   Airway/Respiratory: Uneventful            Sign Out: Acceptable/Baseline resp. status   CV/Hemodynamics: Uneventful            Sign Out: Acceptable CV status; No obvious hypovolemia; No obvious fluid overload   Other NRE: NONE   DID A NON-ROUTINE EVENT OCCUR? No           Last vitals:  Vitals Value Taken Time   /84 09/17/21 1230   Temp 36.4  C (97.6  F) 09/17/21 1230   Pulse 80 09/17/21 1230   Resp 17 09/17/21 1230   SpO2 100 % 09/17/21 1230       Electronically Signed By: Luis Finch MD  September 17, 2021  2:01 PM

## 2021-09-17 NOTE — DISCHARGE INSTRUCTIONS
ON-Q  C-bloc Continuous Nerve Block Discharge Instructions    The Nerve Block    Your anesthesiologist performed a nerve block (a procedure that blocks pain to only a specific area) by inserting a small catheter (tube) in your body.  This catheter is connected to tubing and to a pump that will help control your pain.  The Medicine/Rate______________________________________________    The pump is shaped like a balloon and is filled with     medicine that causes numbness or loss of sensation to help control your pain.  The pain pump DOES NOT contain narcotics.      The medicine in the pump may alter your ability to feel changes in temperature or pressure.  Depending on where the catheter was placed, it may affect your ability to control movement.  After the first few hours you may get some soreness as well as movement back; this is normal.  The Pump      DO NOT SQUEEZE THE PUMP.     The pump delivers medicine at a very slow rate.    You will NOT see the medicine moving through the tubing.    As the medicine is delivered, the pump ball will slowly become smaller.    It may take a day or so before you notice a change in the size and look of the pump.    Depending on the size of your pump, it may take 2 - 5 days to give all the medicine.    The middle part of the pump may look like an apple core when empty.      Photo used with permission from [employer].    Managing Your Pain    The continuous nerve block infusion may not block all of the pain from your surgery (and the benefits of the pump can vary from patient to patient).  It is important that you take the pain medicines prescribed by your surgeon if you need them.      If you continue to have difficulty with your pain control, please page or call the anesthesiologist.  Caring for Your Pump at Home    Wear the pump on the outside of clothing - away from your skin and cold therapy (ice packs).  The delivery rate is accurate only at room temperature.    Make sure  the white clamp on the tubing remains open (moves freely on the tubing).    Make sure there are no kinks in the tubing.    DO NOT tape or cover up the filter.    Protect the pump from sunlight and heat.    When sleeping:   o DO NOT place the pump underneath the bed covers where the pump may become too warm.  o DO NOT place the pump on the floor or hang the pump on a bed post as these situations may cause the tubing to get tangled and get pulled out.    Bathing/Showering:    o We recommend taking sponge baths until the pump is removed.  o Avoid getting the area where the catheter enters your body wet.  o DO NOT let water get in the filter.  o DO NOT submerge the pump in water.  The Infusion    The infusion will be started by the Surgical Center.  DO NOT turn the infusion off unless your anesthesiologist has told you to do so.    DO NOT change the flow rate of the infusion unless your anesthesiologist told you to do so.  Changing the flow rate without your doctor s instruction may result in the wrong dose of medicine, which could cause serious injury.    If your anesthesiologist told you to change the rate of your infusion:  o Flip open the clear cover on the select-a-flow device.  o Turn the white key clockwise on the select-a-flow dial to the instructed rate.  (The rate of the infusion should line up with the black arrow at the top of the controller.)    o Listen for the click when you move the dial.  o The key may be removed from the select-a-flow dial, or the plastic cover on the device may be zip tied shut to ensure safety.      Photo used with permission from [employer].    Activity     DO NOT drive or operate heavy machinery if the nerve block affects an extremity    DO NOT bear weight on the affected extremity until sensation and motion return and directed by your physician    Elevate affected extremity; pillows work well for elevation.    Take care to avoid objects that may put pressure on or cause trauma  to the limb.  Be careful when placing hot or cold objects on the numb area.    For Upper Extremity Nerve Blocks, using the non-operative extremity, you may do range of motion exercises hourly while awake unless directed otherwise.    For Knee Surgery patients with a Femoral or Adductor Canal catheter you must have an Immobilizer on at all times when up until the catheter is removed and full sensation and strength have returned.    For Lower Extremity Nerve Blocks make sure someone is with you the first time you attempt to place full weight on your operative side.  Removing the Catheter    When the pump is empty or if you have been told to stop the infusion you can remove the catheter.  Follow these steps::  o Wash your hands.  o Clamp the tubing (squeeze the white clamp until you hear or feel a click).  o Remove the clear dressing that covers the tubing.  o Grasp the catheter close to the skin and gently pull.  If you meet resistance, STOP pulling and page or call your anesthesiologist.  o DO NOT cut or forcefully remove the catheter.  o After removal, check the end of the catheter for a dark tip.  If you DO NOT see a dark tip, page or call your anesthesiologist.  o Apply firm pressure over the site until oozing stops.  Wash the area with soap and water, dry with a clean towel and then cover with a bandage.   o The pump is not reusable or refillable.  Dispose of it in the trash and wash your hands.  Troubleshooting    Tubing Comes Out From Skin:  If the tube accidentally comes out, check the end of the tubing for a dark tip.    If you see a dark tip simply discard it and use the pain pills prescribed to you by your surgeon.    If you DON T see a dark tip, page or call the anesthesiologist.    Tubing Disconnection:  If the tubing accidentally becomes disconnected from the pump, DO NOT reconnect the pump to the tubing.  It may have been contaminated with germs.  Close the tubing clamp and immediately page or call your  anesthesiologist.    Fluid Leaking:  If fluid is leaking from the site catheter insertion site, close the white clamp on the tubing and page or call your anesthesiologist.    Immediately report the following to your anesthesiologist:    Redness, warmth, swelling, or tenderness at the site the tubing was inserted    Increase in pain    Fever, chills, sweats    Bowel or bladder changes    Difficulty breathing    Dizziness, lightheadedness    Blurred vision    Ringing or buzzing in your ears    Metal taste in your mouth    Numbness and/or tingling around your mouth, fingers or toes    Drowsiness    Confusion    Trouble removing the tubing    Dark tip is not present when tubing is removed      Notifying your Anesthesiologist  o Page:  Dial [phone], then enter [phone].  You will be prompted to enter your phone number and then the # sign.  The anesthesiologist will call you back.  o Call:  Dial [phone].  Ask to speak to the anesthesiologist on call for the [facility].       Caring for Your Dandre-Bowman Drain    You have been discharged with a Dandre-Bowman drainage tube. This tube drains fluid from your incision, helping prevent swelling and reducing the risk for infection. The tube is held in place by a few stitches. The drain will be removed when your doctor determines you no longer need it and when the amount of drainage decreases. The color and amount of fluid varies. Right after surgery, the fluid may be bright red and may become clearer over time.   Dressing:    Keep the skin around the tube dry.    If you have a dressing, change it every day.   o Wash your hands.  o Remove the old bandage. Do not use scissors-you may accidentally cut the tube.  o Check for any redness, swelling, drainage, or broken stitches. (Call your doctor with any of these findings).  o Wash your hands again.  o Wet a cotton swab (Q-tip) and clean around the incision and the tube site. Use normal saline solution  (salt and water) or soap and water. Start at the tube site and move outward in a circular motion.   o Pat dry.  o Put a new bandage on the incision and tube site. Make the bandage large enough to cover the whole incision area.   o Tape the bandage in place.  o Throw out old materials and wash your hands.   Home Care:    Tape the tube to the skin below the bandage. Make sure to keep some slack in the tube to keep it from pulling out.     DO NOT sleep on the same side as the tube.    Secure the tube and bulb inside your clothing with a safety pin. This helps keep the tube from being pulled out.     Keep the bulb compressed at all times, except when you empty it.    Empty your drain at least twice a day. Empty it more often if the drain is full.   o Lift the opening of the drain.  o Drain the fluid into a measuring cup.  o Record the amount of fluid each time you empty. Share the information with your doctor at your follow-up visit.   o Squeeze the bulb with your hands until you hear air coming out of the bulb.  o Close the opening.     Tape plastic wrap over the bandage and tube site when you shower.      Stripping  the tube helps keep blood clots from blocking the tube.                         ONLY DO THIS IF YOUR DOCTOR INSTRUCTED YOU TO DO SO!  o Hold the tubing where it leaves the skin with one hand. This keeps it from pulling on the skin.  o Pinch the tubing with the thumb and first finger of your other hand.   o Slowly and firmly pull your thumb and first finger down the tube (squeezing the tube between your fingers). Keep squeezing the tube as you run your fingers towards the bulb. If the pulling hurts or feels like it is coming out of the skin, STOP. Begin again more gently.  o Let go of the tubing with both hands. If the tube is still blocked, repeat these steps three or four times. Make sure that the bulb is compressed so it creates suction.  When to call your doctor:    New or increased pain around the  tube    Redness, warmth, or swelling around the incision or tube    Drainage that is foul smelling    Vomiting    Fever over 101 F degrees    Fluid leaking around the tube    Incision seems not to be healing    Stitches become loose    The tube falls out    Drainage that changes from light pick to dark red    A sudden increase or decrease in the amount of drainage (over 30 ml).  Your drainage record:  Date Time Bulb 1: Amount of drainage (ml or cc) Bulb 2: Amount of drainage (ml or cc) [facility]  Home Care Following Anesthesia  For 24 hours after surgery:  1. Get plenty of rest.  A responsible adult must stay with you for at least 24 hours after you leave the surgery center.  2. Do not drive or use heavy equipment.  If you have weakness or tingling, don't drive or use heavy equipment until this feeling goes away.   3. Do not drink alcohol.   4. Avoid strenuous or risky activities.  Ask for help when climbing stairs.  5. You may feel lightheaded.  IF so, sit for a few minutes before standing.  Have someone help you get up.   6. If you have nausea (feel sick to your stomach): Drink only clear liquids such as apple juice, ginger ale, broth or 7-Up.  Rest may also help.  Be sure to drink enough fluids.  Move to a regular diet as you feel able.   7. You may have a slight fever.  Call the doctor if your fever is over 100 F (37.7 C) (taken under the tongue) or lasts longer than 24 hours.  8. You may have a dry mouth, a sore throat, muscle aches or trouble sleeping. These should go away after 24 hours.  9. Do not make important or legal decisions.   10. It is recommended to avoid smoking.               Tips for taking pain medications  To get the best pain relief possible, remember these points:    Take pain medications as directed, before pain becomes severe.    Pain medication can  upset your stomach: taking it with food may help.    Constipation is a common side effect of pain medication. Drink plenty of  fluids.    Eat foods high in fiber. Take a stool softener if recommended by your doctor or pharmacist.    Do not drink alcohol, drive or operate machinery while taking pain medications.    Ask about other ways to control pain, such as with heat, ice or relaxation.    Tylenol/Acetaminophen Consumption  To help encourage the safe use of acetaminophen, the makers of TYLENOL  have lowered the maximum daily dose for single-ingredient Extra Strength TYLENOL  (acetaminophen) products sold in the [place] from 8 pills per day (4,000 mg) to 6 pills per day (3,000 mg). The dosing interval has also changed from 2 pills every 4-6 hours to 2 pills every 6 hours.    If you feel your pain relief is insufficient, you may take Tylenol/Acetaminophen in addition to your narcotic pain medication.     Be careful not to exceed 3,000 mg of Tylenol/Acetaminophen in a 24 hour period from all sources.    If you are taking extra strength Tylenol/acetaminophen (500 mg), the maximum dose is 6 tablets in 24 hours.    If you are taking regular strength acetaminophen (325 mg), the maximum dose is 9 tablets in 24 hours.    ***975mg of Tylenol received at 07:42 am***    Call a doctor for any of the followin. Signs of infection (fever, growing tenderness at the surgery site, a large amount of drainage or bleeding, severe pain, foul-smelling drainage, redness, swelling).  2. It has been over 8 to 10 hours since surgery and you are still not able to urinate (pass water).  3. Headache for over 24 hours.  4. Numbness, tingling or weakness the day after surgery (if you had spinal anesthesia).  5. Signs of Covid-19 infection (temperature over 100 degrees, shortness of breath, cough, loss of taste/smell, generalized body aches, persistent headache, chills, sore throat, nausea/vomiting/diarrhea)  Your doctor is:    Dr. Radha Rudd  [place], Plastic Surgery: [phone]  After Hours and Weekends dial: [phone] and ask for the resident on call for:  Plastics  For emergency care, call the:  [facility]:  [phone] (TTY for hearing impaired: [phone])

## 2021-09-17 NOTE — ANESTHESIA PREPROCEDURE EVALUATION
Anesthesia Pre-Procedure Evaluation    Patient: Ramila Woods   MRN: [id number] : 1995        Preoperative Diagnosis: Gender dysphoria in adolescent and adult [F64.0]   Procedure : Procedure(s):  MASTECTOMY, BILATERAL, SIMPLE, possible nipple grafts. OnQ (Bilateral)     No past medical history on file.   History reviewed. No pertinent surgical history.   Allergies   Allergen Reactions     Dust Mites      Seasonal Allergies       Social History     Tobacco Use     Smoking status: Passive Smoke Exposure - Never Smoker     Smokeless tobacco: Never Used     Tobacco comment: 2nd hand smoke exposure years ago   Substance Use Topics     Alcohol use: Never      Wt Readings from Last 1 Encounters:   21 65.3 kg (144 lb)        Anesthesia Evaluation   Pt has had prior anesthetic.     No history of anesthetic complications       ROS/MED HX  ENT/Pulmonary: Comment: Vocal cord anomaly, TMJ    (+) asthma     Neurologic:       Cardiovascular:       METS/Exercise Tolerance:     Hematologic:       Musculoskeletal:       GI/Hepatic:    (-) GERD   Renal/Genitourinary:       Endo:       Psychiatric/Substance Use:     (+) psychiatric history depression and anxiety (Gender dysphoria, PTSD)     Infectious Disease:       Malignancy:       Other:            Physical Exam    Airway        Mallampati: I   TM distance: > 3 FB   Neck ROM: full   Mouth opening: > 3 cm    Respiratory Devices and Support         Dental  no notable dental history         Cardiovascular             Pulmonary                   OUTSIDE LABS:  CBC: No results found for: WBC, HGB, HCT, PLT  BMP: No results found for: NA, POTASSIUM, CHLORIDE, CO2, BUN, CR, GLC  COAGS: No results found for: PTT, INR, FIBR  POC:   Lab Results   Component Value Date    HCG Negative 2021     HEPATIC: No results found for: ALBUMIN, PROTTOTAL, ALT, AST, GGT, ALKPHOS, BILITOTAL, BILIDIRECT, FLOR  OTHER: No results found for: PH, LACT, A1C, DARLINE, PHOS, MAG, LIPASE, AMYLASE,  TSH, T4, T3, CRP, SED    Anesthesia Plan    ASA Status:  2   NPO Status:  NPO Appropriate    Anesthesia Type: General.     - Airway: LMA   Induction: Intravenous, Propofol.   Maintenance: Balanced.        Consents    Anesthesia Plan(s) and associated risks, benefits, and realistic alternatives discussed. Questions answered and patient/representative(s) expressed understanding.     - Discussed with:  Patient      - Extended Intubation/Ventilatory Support Discussed: No.      - Patient is DNR/DNI Status: No    Use of blood products discussed: No .     Postoperative Care    Pain management: IV analgesics, Oral pain medications, Multi-modal analgesia.   PONV prophylaxis: Ondansetron (or other 5HT-3), Dexamethasone or Solumedrol, Background Propofol Infusion     Comments:         H&P reviewed: Unable to attach H&P to encounter due to EHR limitations. H&P Update: appropriate H&P reviewed, patient examined. No interval changes since H&P (within 30 days).         Luis Finch MD

## 2021-09-17 NOTE — ANESTHESIA CARE TRANSFER NOTE
Patient: Ramila Woods    Procedure(s):  MASTECTOMY, BILATERAL, SIMPLE, nipple grafts. OnQ.    Diagnosis: Gender dysphoria in adolescent and adult [F64.0]  Diagnosis Additional Information: No value filed.    Anesthesia Type:   General     Note:    Oropharynx: oropharynx clear of all foreign objects  Level of Consciousness: drowsy  Oxygen Supplementation: room air    Independent Airway: airway patency satisfactory and stable  Dentition: dentition unchanged  Vital Signs Stable: post-procedure vital signs reviewed and stable  Report to RN Given: handoff report given  Patient transferred to: PACU    Handoff Report: Identifed the Patient, Identified the Reponsible Provider, Reviewed the pertinent medical history, Discussed the surgical course, Reviewed Intra-OP anesthesia mangement and issues during anesthesia, Set expectations for post-procedure period and Allowed opportunity for questions and acknowledgement of understanding      Vitals:  Vitals Value Taken Time   /74    Temp     Pulse 58 09/17/21 1145   Resp 13 09/17/21 1145   SpO2 98 % 09/17/21 1145   Vitals shown include unvalidated device data.    Electronically Signed By: WIL Carvalho CRNA  September 17, 2021  11:46 AM

## 2021-09-18 NOTE — OP NOTE
Procedure Date: 09/17/2021    ATTENDING SURGEON:  Radha Tan MD    ASSISTANT:  Candace Fields PA-C (no resident available, MARCUS did 50% of case).    PREOPERATIVE DIAGNOSIS:  Gender dysphoria.    POSTOPERATIVE DIAGNOSIS:  Gender dysphoria.    PROCEDURE PERFORMED:  Bilateral simple mastectomies with nipple graft reconstruction with On-Q catheter placement.    ANESTHESIA:  G-LMA.    ESTIMATED BLOOD LOSS:  25 mL    INTRAVENOUS FLUIDS:  800 mL    URINE OUTPUT:  200 mL    COUNTS:  Correct.    COMPLICATIONS:  None.    DRAINS:  ORALIA x 2.    SPECIMENS:  Left breast 298 grams, right breast 258 grams.    INDICATIONS FOR PROCEDURE:  This is a 26-year-old biological female transitioning, who identifies as agender.  They have a diagnosis of gender dysphoria and met with [facility] and insurance criteria for gender affirming top surgery.  Due to the patient's breast volume and ptosis, they would require double incision approach to their mastectomy and desired nipple graft reconstruction.    DESCRIPTION OF PROCEDURE:  The patient was seen in the preoperative waiting area.  The operative sites were marked.  This included sternal notch, sternal midline, inframammary folds, vertical line through the nipple areolar complex as well as vertical lines from the medial and lateral aspects of the breast footplate.  We also marked the inferior border of the pectoralis major origin on flexion and transposed transverse line from the mid humerus.  Informed consent was obtained after reviewing possible risks and complications, including but not limited to the following:  Infection, bleeding, hematoma, seroma formation, poor healing, possible dehiscence, possible spitting sutures, possible hypertrophic scarring, possible partial or complete loss of nipple graft, possible altered sensation of the chest wall either hypo or hypersensitivity, possible injury to surrounding neurovascular musculoskeletal structures as well as intrathoracic and  interaxillary structures, possible asymmetries and residual deformities, possible need for further surgery, possible anesthetic risks such as DVT, PE and cardiopulmonary arrest.  The patient was then brought to the operating room and placed supine on the OR table.  After general anesthesia was administered, and the patient had an LMA placed, a Loyola was placed.  The thighs and forelegs were supported with pillows and secured with padded safety straps.  The patient already had sequential compression devices on the lower extremities prior to induction.  Lower Jesse Hugger was placed.  Arms were secured to arm boards with Ace wraps.  The patient was put into a sitting position to make adjustments for symmetry.  Then, the chest, breast area was prepped and draped in the usual sterile fashion using ChloraPrep.  A timeout was taken and the proper patient and procedure were identified.  Inframammary fold incisions were made after using a scalpel into the dermal layer.  [facility] cautery was used on the right side.  Approximately 2 cm of subcutaneous fat was left along the IMF border before beveling down to the pectoralis fascia.  Breast mound was elevated off the pectoral fascia superiorly towards the clavicle, medially towards the parasternal border and laterally past the lateral border of the pectoralis major muscle.  This then allowed us to displace the breast mound inferiorly and wilber where it overlapped with the IMF incision.  This essentially elevated the incision up to the level of the pectoralis major origin.  The nipple areolar complex was then harvested sharply with a #10 blade.  This was kept wrapped in normal saline moistened gauze on the back table, marked per side.  Breast mounds were then resected, they were amputated.  Additional thinning of the superior skin flaps was done to achieve a flat contour and symmetry.  Of note, the patient had fairly prominent ribs, particularly near the costochondral junction.   There was a bit of tapering inferolaterally.  There is a moderate length of the thorax.  All tissues were ultimately passed off the back table and weighed before sending to Pathology for definitive histopathologic exam.     After initial resection, the incisions were temporarily skin stapled and the patient was put into a sitting position.  This allowed us to make further adjustments with regards to the level and shape of our IMF incisions.  There is a slight extension laterally to eliminate any dog ear deformities.  The patient did not really have much in the way of lateral thoracic rolls.  There was a bit of a soft tissue fullness.  We did not have to connect our incisions in the midline, although they were quite close.  When these additional tailorings were done and the tissue added to the path specimen, we then irrigated the dissection pockets with Ancef solution with saline.  Hemostasis was achieved with cautery.  A #15 round ORALIA drains were introduced through a separate stab wound incision laterally and secured with 3-0 nylon suture.  This was draped along the inferior pocket.  Dual On-Q 10-inch catheters were percutaneously introduced from the epigastric region and draped along the superior pocket.  These were secured with benzoin and Tegaderm.  Definitive closure was then achieved with 3-0 Vicryl deep dermal buried sutures followed by 4-0 Vicryl running subcuticular suture.  Just a few 5-0 fast absorbing guts were used for tidying up the skin here and there.  We then turned our attention to nipple graft reconstruction.  With the patient in sitting position, a template of approximately 1.5 cm in diameter was created and placed on the chest wall until we had identified the most aesthetic appealing positioning for that.  These were then deepithelialized sharply with a 15-blade.  Hemostasis was achieved with direct pressure and light cautery.  The nipple areolar complexes were then aggressively thinned on the  back table to facilitate graft take, as well as resecting excess nipple and areola.  The graft was then secured with 5-0 fast absorbing gut interrupted and running cuticular sutures as well as centrally through the nipple and scoring them off.  The grafts were pie crusted with an 18-gauge needle.  A bolster dressing consisting of Xeroform sheet with antibiotic-soaked cotton balls were held in place with 2-0 silk and skin staples.  ORALIA drains were then trimmed and put to bulb suction.  Exofin Dermabond was applied to the incisions.  ABD pads were used for drain sponges and a couple Kerlix rolls were unfurled across the anterior chest for padding beneath circumferentially wrapped double long 6-inch Ace wrap for compression.  The patient was extubated.  Loyola was discontinued.  The patient was transferred to a stretcher.  The On-Q catheters were attached to the reservoir containing 550 mL of 0.2% ropivacaine to be delivered at 2 mL per hour per catheter for the next 5 days.  The heat sensor valves were secured to the abdomen with Tegaderm.  The patient was then taken to the recovery room in satisfactory condition having tolerated the procedure without difficulty or complication.  Weights from specimens before sending to Pathology were as follows:  Left breast 298 grams, right breast 258 grams.  Of note, on the right side when we were mobilizing the breast tissue off the pectoral fascia, we encountered what appeared to be a soft, very discrete, almost hematomatous  or adenofibromatous mass about the size of a grape.  This was excised and sent to pathology as an additional path specimen for permanent in formalin.    Radha Tan MD        D: 2021   T: 2021   MT: JOSE    Name:     MATTHEW WINN  MRN:      [id number]        Account:        [id number]   :      1995           Procedure Date: 2021     Document: [id number]

## 2021-09-21 NOTE — PROGRESS NOTES
Plastic Surgery Outpatient Visit    ID: Ramila Woods is a 26 year old other s/p bilateral mastectomy with nipple grafts 9/17/2021 with Dr. Tan.     S: healing well. Felt good pain relief with on Q. Took only 1 oxycodone. Drain output 10cc daily.     O:  /72   Pulse 69   Temp 97.9  F (36.6  C) (Oral)   SpO2 97%    General: NAD  Chest: bilateral chest incisions with tape c/d/i. Nipple grafts intact, well adhered. Some oozing from edges of L graft.     PATH: pending    A/P:  -healing well  -drains removed today  -nipple dressings x1 week  -wrap x1 week  - tape off in 2 weeks.  -Trex/5lb lifting restrictions x2 more weeks. Ok to increase movement and lifting up to 10lbs after this until 6 weeks post op, when restrictions will likely be cleared.  -RTC 6 weeks with Dr. Britney Fields PA-C  Plastic and Reconstructive Surgery    20 minutes spent on the date of the encounter doing chart review, history and physical, dressing changes, documentation and further activity as noted above.

## 2021-09-22 ENCOUNTER — VIRTUAL VISIT (OUTPATIENT)
Dept: PSYCHOLOGY | Facility: CLINIC | Age: 26
End: 2021-09-22
Payer: COMMERCIAL

## 2021-09-22 DIAGNOSIS — F41.1 GENERALIZED ANXIETY DISORDER: ICD-10-CM

## 2021-09-22 DIAGNOSIS — F33.1 MAJOR DEPRESSIVE DISORDER, RECURRENT EPISODE, MODERATE (H): ICD-10-CM

## 2021-09-22 DIAGNOSIS — F64.0 GENDER DYSPHORIA IN ADOLESCENT AND ADULT: Primary | ICD-10-CM

## 2021-09-22 PROCEDURE — 99207 PR NO BILLABLE SERVICE THIS VISIT: CPT | Performed by: STUDENT IN AN ORGANIZED HEALTH CARE EDUCATION/TRAINING PROGRAM

## 2021-09-22 PROCEDURE — 90832 PSYTX W PT 30 MINUTES: CPT | Mod: 95 | Performed by: STUDENT IN AN ORGANIZED HEALTH CARE EDUCATION/TRAINING PROGRAM

## 2021-09-22 NOTE — PROGRESS NOTES
[facility] -  Case Progress Note    Date of Service: 21   Name: Ramila Barnes)  Gender Identity: Non-binary/Agender  Pronouns: They/them  : 1995  Medical Record Number: [id number]  Treating Provider: Nabil Bahena, PhD  Type of Session: Individual  Present in Session: Nabil Ragland  Number of Minutes: 34  Video start time: 3:04pm  Video end time: 3:38pm    Telemedicine Visit: The patient's condition can be safely assessed and treated via synchronous audio and visual telemedicine encounter.       Reason for Telemedicine Visit: Services only offered telehealth     Originating Site (Patient Location): Patient's home     Distant Site (Provider Location): Provider Remote Setting     Consent:  The patient/guardian has verbally consented to: the potential risks and benefits of telemedicine (video visit) versus in person care; bill my insurance or make self-payment for services provided; and responsibility for payment of non-covered services.      Mode of Communication:  Video Conference via [employer].     As the provider I attest to compliance with applicable laws and regulations related to telemedicine.    Health Maintenance Summary - Mental Health Treatment Plan       Status Date      MENTAL HEALTH TX PLAN Next Due 3/14/2022      Done 2021 [facility] MENTAL HEALTH TX PLAN SCAN     Done 3/5/2020 [facility] MENTAL HEALTH TX PLAN SCAN        Current Symptoms/Status:  Client has an established history of experiencing gender dysphoria, psychological distress stemming from incongruence with gender identity and sex assigned at birth exacerbated by primary and secondary sex characteristics. Client reported experiencing the above-mentioned symptoms as well as several depressive symptoms (sadness, fatigue, low energy and interest in pleasurable activities, low motivation, sleep disturbance: difficulty stay asleep and waking up several times in the middle of the night). Client reports ongoing concerns  "related to gender dysphoria and navigating interpersonal relationships. Client reported a history of family trauma related to substance use and addiction, difficulties being accepted as agender/non-binary by family, and navigating societal expectations of relationships.     Progress Toward Treatment Goals:   Client reported making progress in their interpersonal relationships but struggling with symptoms of depression, anxiety, and gender dysphoria. Client expressed commitment to working toward their treatment goals: (1) emotionally work through transition-related issues (prepare for top surgery, letters, etc.), (2) improve communication in relationship (open discussion of boundaries, etc.), and (3) learn new skills to cope with feelings of gender dysphoria. Client will have top surgery on 9/17/2021.    Intervention: Modality and Description:   Client arrived to session on time and reported that their top surgery went well (9/17/2021 with Dr. Tan). They reported doing their best to take care of their body to ensure healthy post-op recovery. They reported feeling more depressed after surgery due to physical limitations and not being able to do routine tasks that they are used to. They also expressed feeling \"cabin fever\" and social isolation from having to stay inside and not being able to walk too far outside. The session focused on processing the client's feelings about their surgery and recovery. They reported that their gender and body dysphoria has lessened due to euphoria after surgery. They have been experiencing a reduction in symptom severity, particularly depression (sadness, fatigue, low energy and interest in pleasurable activities, low motivation, sleep disturbance: difficulty staying asleep and waking up several times in the middle of the night) and anxiety. Therapist provided support and validation and helped client think through preparation for surgery and recovery plans. Therapist used " "emotion-focused strategies and CBT to help client process concerns about gender and bodily dysphoria. Session ended early due to client feeling fatigued.    Response to Intervention:  Client was open and receptive to strategies and interventions used in session. Client stated they would like to continue working toward noticing their symptoms and communicating them to their psychiatrist. Client was open to developing more self-care strategies to promote their own wellness.     Assignment:  - Client will continue to work toward their preparation for surgery and post-op recovery plans.    Interactive Complexity:  Not applicable.    DSM-5 Diagnoses:  302.85 (F64.0) Gender Dysphoria in adolescent and adult  296.32 (F33.1) Major Depressive Disorder, Recurrent, Moderate  300.02 (F41.1) Generalized Anxiety Disorder    Plan/Need for Future Services:  Return for therapy in 1 week to treat diagnosed problems.        Nabil Bahena, PhD  Postdoctoral Fellow       _______________________________         TREATMENT PLAN  Date of Treatment Plan: 2021  Name: Ramila Woods \"Obie\"  : 1995  Medical Record Number: [id number]  Treating Provider: Nabil Bahena,  Type of Session: Individual  Present in Session: Nabil Ragland    Current Status:  Depression/Mood:  Sadness  Increased crying  Decreased interest  Decreased appetite  Sleep irregularities  Low energy  Low self-esteem/guilt  Irritability  Decreased concentration/indecision  Rumination  Dysphoria     Anxiety/Panic:  Worry/Anxiety  GI Distress  Social Fear  Dizziness  Physiological reactivity     Thought:   Distractible  Racing Thoughts     Sensorium:  Paranoia     Behavior/Health:  Increase in goal directed activities  Occupational problems     Chemical Use:  None     Sexual Problems: (Note: client identifies as asexual)  Low Desire  Sexual Aversion     Gender concerns:  Gender dysphoria  Body dysphoria  Social dysphoria     Suicide Risk Assessment:  Assessed Level of " Immediate Risk:  None  Ideation:  NO  Plan:  NO   Means:  NO  Intent:  NO     Homicide Risk Assessment:  Assessed Level of Immediate Risk:  None  Ideation:  NO  Plan:  NO  Means:  NO  Intent:  NO     Impact of Symptoms on Function:  Physical/Health  Social/Family  Work     DSM-5 Diagnoses:   302.6 (F64.0) Gender Dysphoria in adolescent and adult  300.02 (F41.1) Generalized Anxiety Disorder  296.32 (F33.1) Major Depressive Disorder, Recurrent, Moderate     Screening Questionnaires:  Completed the following screening questionnaires:  PHQ-9: Score = 11, indicating moderate depression.  LOVE-7: Score = 12, indicating moderate anxiety     Problem(s):  1. Persistent gender dysphoria   2. Symptoms of anxiety and depression  3. Relationship difficulties  4. Decreased desire in doing things     Short-Long Term Goals  (1) emotionally work through transition-related issues (prepare for top surgery, letters, etc.)  (2) improve communication in relationship (open discussion of boundaries, etc.)  (3) learn new skills to cope with feelings of gender dysphoria     Interventions  Emotion-Focused Techniques  Cognitive-Behavior Therapy     Expected Outcomes and Prognosis  Return to normal functioning     Frequency of Sessions  Weekly sessions for now, biweekly in June     Current Psychoactive Medications:  None  Discharge & Aftercare Goals: TBD  Care Coordination: No     Consent to Treatment:   Patient participated in this treatment planning process and indicated verbal agreement with the above treatment plan.  If patient doesn't sign, indicate why: Telehealth visit due to COVID19 pandemic

## 2021-09-24 ENCOUNTER — OFFICE VISIT (OUTPATIENT)
Dept: PLASTIC SURGERY | Facility: CLINIC | Age: 26
End: 2021-09-24
Payer: COMMERCIAL

## 2021-09-24 VITALS
SYSTOLIC BLOOD PRESSURE: 117 MMHG | HEART RATE: 69 BPM | TEMPERATURE: 97.9 F | DIASTOLIC BLOOD PRESSURE: 72 MMHG | OXYGEN SATURATION: 97 %

## 2021-09-24 DIAGNOSIS — F64.0 GENDER DYSPHORIA IN ADULT: Primary | ICD-10-CM

## 2021-09-24 LAB
PATH REPORT.COMMENTS IMP SPEC: NORMAL
PATH REPORT.COMMENTS IMP SPEC: NORMAL
PATH REPORT.FINAL DX SPEC: NORMAL
PATH REPORT.GROSS SPEC: NORMAL
PATH REPORT.MICROSCOPIC SPEC OTHER STN: NORMAL
PATH REPORT.RELEVANT HX SPEC: NORMAL
PHOTO IMAGE: NORMAL

## 2021-09-24 PROCEDURE — 88304 TISSUE EXAM BY PATHOLOGIST: CPT | Mod: 26 | Performed by: PATHOLOGY

## 2021-09-24 PROCEDURE — 99024 POSTOP FOLLOW-UP VISIT: CPT | Performed by: PHYSICIAN ASSISTANT

## 2021-09-24 RX ORDER — HYDROXYZINE HYDROCHLORIDE 25 MG/1
25 TABLET, FILM COATED ORAL 3 TIMES DAILY PRN
Qty: 12 TABLET | Refills: 0 | Status: SHIPPED | OUTPATIENT
Start: 2021-09-24 | End: 2022-05-02

## 2021-09-24 ASSESSMENT — PAIN SCALES - GENERAL: PAINLEVEL: MODERATE PAIN (4)

## 2021-09-24 NOTE — LETTER
9/24/2021       RE: Ramila Woods  [address]  [address]  [place] [place] [facility] [address]     Dear Colleague,    Thank you for referring your patient, Ramila Woods, to the [facility] [place] at [facility]. Please see a copy of my visit note below.    Plastic Surgery Outpatient Visit    ID: Ramila Woods is a 26 year old other s/p bilateral mastectomy with nipple grafts 9/17/2021 with Dr. aTn.     S: healing well. Felt good pain relief with on Q. Took only 1 oxycodone. Drain output 10cc daily.     O:  /72   Pulse 69   Temp 97.9  F (36.6  C) (Oral)   SpO2 97%    General: NAD  Chest: bilateral chest incisions with tape c/d/i. Nipple grafts intact, well adhered. Some oozing from edges of L graft.     PATH: pending    A/P:  -healing well  -drains removed today  -nipple dressings x1 week  -wrap x1 week  - tape off in 2 weeks.  -Trex/5lb lifting restrictions x2 more weeks. Ok to increase movement and lifting up to 10lbs after this until 6 weeks post op, when restrictions will likely be cleared.  -RTC 6 weeks with Dr. Britney Fields PA-C  Plastic and Reconstructive Surgery    20 minutes spent on the date of the encounter doing chart review, history and physical, dressing changes, documentation and further activity as noted above.          Again, thank you for allowing me to participate in the care of your patient.      Sincerely,    Candace Fields PA-C

## 2021-09-24 NOTE — NURSING NOTE
Chief Complaint   Patient presents with     Surgical Followup     1 week post-op -- DOS 9/17 -- Dr Tan       Vitals:    09/24/21 1009   BP: 117/72   Pulse: 69   Temp: 97.9  F (36.6  C)   TempSrc: Oral   SpO2: 97%       There is no height or weight on file to calculate BMI.          CHUYITA RUSSELL EMT

## 2021-09-24 NOTE — PATIENT INSTRUCTIONS
Care of Nipples and Chest Incisions    Change nipple dressings daily.  Take dressings off before or after showering.  No direct shower spray on nipple grafts for 4 weeks from your surgery date.     Cut vaseline gauze to nipple size and place over nipple.  Place folded white gauze over vaseline gauze and cover with plastic dressing.  Do dressing changes for 1 more week from today.  If you continue to have drainage, continue the dressings until drainage stops.     Keep compression on for 1 more week from today. No lifting greater than 5 lbs for 4 weeks from your surgery date. Begin moisturizing your incisions with any non-scented, non-glittered lotion 3 weeks from your surgery date. Once this becomes loose, you may peel off the incisional tape. Then apply silicone gel sheets to incisions.  These can be purchased on [employer] and at [employer] and [employer].     At one month post op, baseline shoulder motion should return. You may start to exercise lightly at this time, gradually moving up to arm movement. Full shoulder motion should return by 8 weeks.      When to call:  Sudden increase of swelling or pain on one side  Uncontrolled pain despite pain medication  Worsening of chest swelling   Separation of nipple from chest skin  Redness and warmth in chest area  Fever > 101     Contact the RN between 8-3:30 Mon-Fri with questions or concerns through my chart message via your doctor's name (most efficient) or call at [phone].   For urgent medical issues that cannot wait, call [phone] Mon-Fri 8:-4:30.     After hours, weekends or holidays, call [phone] and ask to speak to the on call plastic surgeon fellow.

## 2021-09-27 NOTE — PROGRESS NOTES
I did not personally see the patient but I have reviewed and agree with the assessment and plan as documented in this note.  Shell Deras, PhD -- Supervisor   Licensed Psychologist

## 2021-09-28 ENCOUNTER — VIRTUAL VISIT (OUTPATIENT)
Dept: PSYCHOLOGY | Facility: CLINIC | Age: 26
End: 2021-09-28
Payer: COMMERCIAL

## 2021-09-28 DIAGNOSIS — F41.1 GENERALIZED ANXIETY DISORDER: ICD-10-CM

## 2021-09-28 DIAGNOSIS — F33.1 MAJOR DEPRESSIVE DISORDER, RECURRENT EPISODE, MODERATE (H): ICD-10-CM

## 2021-09-28 DIAGNOSIS — F64.0 GENDER DYSPHORIA IN ADOLESCENT AND ADULT: Primary | ICD-10-CM

## 2021-09-28 PROCEDURE — 90832 PSYTX W PT 30 MINUTES: CPT | Mod: 95 | Performed by: STUDENT IN AN ORGANIZED HEALTH CARE EDUCATION/TRAINING PROGRAM

## 2021-09-28 PROCEDURE — 99207 PR NO BILLABLE SERVICE THIS VISIT: CPT | Performed by: STUDENT IN AN ORGANIZED HEALTH CARE EDUCATION/TRAINING PROGRAM

## 2021-09-28 NOTE — PROGRESS NOTES
[facility] -  Case Progress Note    Date of Service: 21   Name: Ramila Barnes)  Gender Identity: Non-binary/Agender  Pronouns: They/them  : 1995  Medical Record Number: [id number]  Treating Provider: Nabil Bahena, PhD  Type of Session: Individual  Present in Session: Nabil Ragland  Number of Minutes: 35  Video start time: 2:00pm  Video end time: 2:35pm    Telemedicine Visit: The patient's condition can be safely assessed and treated via synchronous audio and visual telemedicine encounter.       Reason for Telemedicine Visit: Services only offered telehealth     Originating Site (Patient Location): Patient's home     Distant Site (Provider Location): Provider Remote Setting     Consent:  The patient/guardian has verbally consented to: the potential risks and benefits of telemedicine (video visit) versus in person care; bill my insurance or make self-payment for services provided; and responsibility for payment of non-covered services.      Mode of Communication:  Video Conference via Doxy due to tech issues.     As the provider I attest to compliance with applicable laws and regulations related to telemedicine.    Health Maintenance Summary - Mental Health Treatment Plan       Status Date      MENTAL HEALTH TX PLAN Next Due 3/14/2022      Done 2021 [facility] MENTAL HEALTH TX PLAN SCAN     Done 3/5/2020 [facility] M[facility]AL HE[facility] TX PLAN SCAN        Current Symptoms/Status:  Client has an established history of experiencing gender dysphoria, psychological distress stemming from incongruence with gender identity and sex assigned at birth exacerbated by primary and secondary sex characteristics. Client reported experiencing the above-mentioned symptoms as well as several depressive symptoms (sadness, fatigue, low energy and interest in pleasurable activities, low motivation, sleep disturbance: difficulty stay asleep and waking up several times in the middle of the night). Client reports ongoing  concerns related to gender dysphoria and navigating interpersonal relationships. Client reported a history of family trauma related to substance use and addiction, difficulties being accepted as agender/non-binary by family, and navigating societal expectations of relationships.     Progress Toward Treatment Goals:   Client reported making progress in their interpersonal relationships but struggling with symptoms of depression, anxiety, and gender dysphoria. Client expressed commitment to working toward their treatment goals: (1) emotionally work through transition-related issues (prepare for top surgery, letters, etc.), (2) improve communication in relationship (open discussion of boundaries, etc.), and (3) learn new skills to cope with feelings of gender dysphoria. Client had top surgery on 9/17/2021. In this session, client focused on improving their awareness of their emotional and physical needs, while also identifying coping skills and strategies.    Intervention: Modality and Description:   Client reported continuing to feel feeling depressed after surgery due to physical limitations and not being able to do routine tasks that they are used to, but these symptoms have lessened. They endorsed symptoms of gender dysphoria, depression (sadness, fatigue, low energy and interest in pleasurable activities, low motivation, sleep disturbance: difficulty staying asleep and waking up several times in the middle of the night), and anxiety.   They also expressed doing their best to take care of their body to ensure healthy post-op recovery, but they have been experienced more headaches and muscle stiffness. They also expressed feeling less socially isolated as they have been trying to spend time with friends and go on short walks. This session focused on processing the client's feelings about their surgery and recovery, as how this process has impacted their relationship with their partner and coping skills they can use  "to respond to stressors. Therapist provided support and validation and helped client think through preparation for surgery and recovery plans. Therapist used emotion-focused strategies and CBT to help client process concerns about gender and bodily dysphoria. Session ended early due to client feeling fatigued.    Response to Intervention:  Client was open and receptive to strategies and interventions used in session. Client stated they would like to continue working toward noticing their symptoms and communicating them to their psychiatrist. Client was open to developing more self-care strategies to promote their own wellness.     Assignment:  - Client will continue to work toward a healthy post-op recovery.    Interactive Complexity:  Not applicable.    DSM-5 Diagnoses:  302.85 (F64.0) Gender Dysphoria in adolescent and adult  296.32 (F33.1) Major Depressive Disorder, Recurrent, Moderate  300.02 (F41.1) Generalized Anxiety Disorder    Plan/Need for Future Services:  Return for therapy in 2 weeks to treat diagnosed problems.        Nabil Bahena, PhD  Postdoctoral Fellow       _______________________________         TREATMENT PLAN  Date of Treatment Plan: 2021  Name: Ramila Woods \"Obie\"  : 1995  Medical Record Number: [id number]  Treating Provider: Nabil Bahena,  Type of Session: Individual  Present in Session: Nabil Ragland    Current Status:  Depression/Mood:  Sadness  Increased crying  Decreased interest  Decreased appetite  Sleep irregularities  Low energy  Low self-esteem/guilt  Irritability  Decreased concentration/indecision  Rumination  Dysphoria     Anxiety/Panic:  Worry/Anxiety  GI Distress  Social Fear  Dizziness  Physiological reactivity     Thought:   Distractible  Racing Thoughts     Sensorium:  Paranoia     Behavior/Health:  Increase in goal directed activities  Occupational problems     Chemical Use:  None     Sexual Problems: (Note: client identifies as asexual)  Low Desire  Sexual " Aversion     Gender concerns:  Gender dysphoria  Body dysphoria  Social dysphoria     Suicide Risk Assessment:  Assessed Level of Immediate Risk:  None  Ideation:  NO  Plan:  NO   Means:  NO  Intent:  NO     Homicide Risk Assessment:  Assessed Level of Immediate Risk:  None  Ideation:  NO  Plan:  NO  Means:  NO  Intent:  NO     Impact of Symptoms on Function:  Physical/Health  Social/Family  Work     DSM-5 Diagnoses:   302.6 (F64.0) Gender Dysphoria in adolescent and adult  300.02 (F41.1) Generalized Anxiety Disorder  296.32 (F33.1) Major Depressive Disorder, Recurrent, Moderate     Screening Questionnaires:  Completed the following screening questionnaires:  PHQ-9: Score = 11, indicating moderate depression.  LOVE-7: Score = 12, indicating moderate anxiety     Problem(s):  1. Persistent gender dysphoria   2. Symptoms of anxiety and depression  3. Relationship difficulties  4. Decreased desire in doing things     Short-Long Term Goals  (1) emotionally work through transition-related issues (prepare for top surgery, letters, etc.)  (2) improve communication in relationship (open discussion of boundaries, etc.)  (3) learn new skills to cope with feelings of gender dysphoria     Interventions  Emotion-Focused Techniques  Cognitive-Behavior Therapy     Expected Outcomes and Prognosis  Return to normal functioning     Frequency of Sessions  Weekly sessions for now, biweekly in June     Current Psychoactive Medications:  None  Discharge & Aftercare Goals: TBD  Care Coordination: No     Consent to Treatment:   Patient participated in this treatment planning process and indicated verbal agreement with the above treatment plan.  If patient doesn't sign, indicate why: Telehealth visit due to COVID19 pandemic

## 2021-10-01 PROBLEM — Z91.52 HISTORY OF NON-SUICIDAL SELF-HARM: Status: ACTIVE | Noted: 2020-10-14

## 2021-10-26 ENCOUNTER — OFFICE VISIT (OUTPATIENT)
Dept: PLASTIC SURGERY | Facility: CLINIC | Age: 26
End: 2021-10-26
Payer: COMMERCIAL

## 2021-10-26 VITALS
OXYGEN SATURATION: 99 % | TEMPERATURE: 97.7 F | HEIGHT: 66 IN | DIASTOLIC BLOOD PRESSURE: 63 MMHG | BODY MASS INDEX: 23.14 KG/M2 | WEIGHT: 144 LBS | HEART RATE: 68 BPM | SYSTOLIC BLOOD PRESSURE: 108 MMHG

## 2021-10-26 DIAGNOSIS — Z90.13 S/P BILATERAL MASTECTOMY: Primary | ICD-10-CM

## 2021-10-26 PROCEDURE — 99024 POSTOP FOLLOW-UP VISIT: CPT | Performed by: SURGERY

## 2021-10-26 RX ORDER — METHOCARBAMOL 500 MG/1
TABLET, FILM COATED ORAL
COMMUNITY
Start: 2021-10-14 | End: 2022-05-02

## 2021-10-26 ASSESSMENT — MIFFLIN-ST. JEOR: SCORE: 1404.93

## 2021-10-26 ASSESSMENT — PAIN SCALES - GENERAL: PAINLEVEL: NO PAIN (0)

## 2021-10-26 NOTE — PROGRESS NOTES
"PLASTICS POST-OP  This is a 26 year old nonbinary person with a history of gender dysphoria who presents 6 weeks post-op after a bilateral simple mastectomy with nipple graft reconstruction on 09/17/2021. They are here today with by themselves. They stated having pain and severe itchiness. They stated that the OnQ was helpful along with Tylenol. They only took one tablet of oxycodone. Patient reported nausea and vomiting the first two days. ROM is 90-95%. They report being cautious when it comes to raising arms. Patient is OK to try sleeping in the prone position. Sensation is greater centrally vs laterally. They report using Vaseline, and questions when they can try using scar treatments. They were informed that they are OK to begin scar treatments. Patient concerned if they can start deep stretching and cleaning. They were given OK to begin both activities starting slow and increasing as tolerated.  They report walking around shirtless, and that they are very happy with their results.     PE: Chest: Nice upper chest contour.   Good symmetry.   Minimally thick incisions.   No bilateral dog ears.  Minimal hypopigmentation on nipples  Minimal \"dip\" on R lateral incision   Spitting sutures x2 on L lateral incision.  Incisions do not touch at midline.  Photos taken with verbal consent.     A&P: 26 year old nonbinary person who presents 6 weeks post-op after a bilateral simple mastectomy with nipple graft conservation on 09/17/2021.     Overall Obie is healing well. They can gradually increase activity as tolerated, including deep cleaning around the house.    We discussed scar reducing techniques, such as Mederma, silicone strips, vitamin E oil, emu oil, massage and when he may begin using these.     They will follow up 6-12 months post-op. Causes for returning sooner were discussed.     Total time = 15 minutes, spent on the date of encounter doing chart review, history and physical, dressing changes, documentation, " patient education, and any further activity as noted above.     This note was prepared on behalf of Radha Tan MD by Do Brownlee, a trained medical scribe, based on my observations and the provider's statements to me.

## 2021-10-26 NOTE — LETTER
"10/26/2021       RE: Ramila Woods  [address]  [address]  [facility] [address]     Dear Colleague,    Thank you for referring your patient, Ramila Woods, to the [facility] [place] at [facility]. Please see a copy of my visit note below.    PLASTICS POST-OP  This is a 26 year old nonbinary person with a history of gender dysphoria who presents 6 weeks post-op after a bilateral simple mastectomy with nipple graft reconstruction on 09/17/2021. They are here today with by themselves. They stated having pain and severe itchiness. They stated that the OnQ was helpful along with Tylenol. They only took one tablet of oxycodone. Patient reported nausea and vomiting the first two days. ROM is 90-95%. They report being cautious when it comes to raising arms. Patient is OK to try sleeping in the prone position. Sensation is greater centrally vs laterally. They report using Vaseline, and questions when they can try using scar treatments. They were informed that they are OK to begin scar treatments. Patient concerned if they can start deep stretching and cleaning. They were given OK to begin both activities starting slow and increasing as tolerated.  They report walking around shirtless, and that they are very happy with their results.     PE: Chest: Nice upper chest contour.   Good symmetry.   Minimally thick incisions.   No bilateral dog ears.  Minimal hypopigmentation on nipples  Minimal \"dip\" on R lateral incision   Spitting sutures x2 on L lateral incision.  Incisions do not touch at midline.  Photos taken with verbal consent.     A&P: 26 year old nonbinary person who presents 6 weeks post-op after a bilateral simple mastectomy with nipple graft conservation on 09/17/2021.     Overall Obie is healing well. They can gradually increase activity as tolerated, including deep cleaning around the house.    We discussed scar " reducing techniques, such as Mederma, silicone strips, vitamin E oil, emu oil, massage and when he may begin using these.     They will follow up 6-12 months post-op. Causes for returning sooner were discussed.     Total time = 15 minutes, spent on the date of encounter doing chart review, history and physical, dressing changes, documentation, patient education, and any further activity as noted above.     This note was prepared on behalf of Radha Tan MD by Do Brownlee, a trained medical scribe, based on my observations and the provider's statements to me.         Again, thank you for allowing me to participate in the care of your patient.      Sincerely,    Radha Tan MD

## 2021-10-26 NOTE — NURSING NOTE
"Chief Complaint   Patient presents with     RECHECK     Obie, is being seen today for a 6 week post op DOS 9/17, questions regarding scar area.       Vitals:    10/26/21 1235   BP: 108/63   BP Location: Left arm   Patient Position: Chair   Cuff Size: Adult Regular   Pulse: 68   Temp: 97.7  F (36.5  C)   TempSrc: Oral   SpO2: 99%   Weight: 65.3 kg (144 lb)   Height: 1.676 m (5' 6\")       Body mass index is 23.24 kg/m .      Jennifer Watson LPN    "

## 2021-11-10 ENCOUNTER — VIRTUAL VISIT (OUTPATIENT)
Dept: PSYCHOLOGY | Facility: CLINIC | Age: 26
End: 2021-11-10
Payer: COMMERCIAL

## 2021-11-10 DIAGNOSIS — F64.0 GENDER DYSPHORIA IN ADOLESCENT AND ADULT: Primary | ICD-10-CM

## 2021-11-10 DIAGNOSIS — F33.1 MAJOR DEPRESSIVE DISORDER, RECURRENT EPISODE, MODERATE (H): ICD-10-CM

## 2021-11-10 DIAGNOSIS — F41.1 GENERALIZED ANXIETY DISORDER: ICD-10-CM

## 2021-11-10 PROCEDURE — 90832 PSYTX W PT 30 MINUTES: CPT | Mod: 95 | Performed by: STUDENT IN AN ORGANIZED HEALTH CARE EDUCATION/TRAINING PROGRAM

## 2021-11-10 PROCEDURE — 99207 PR NO BILLABLE SERVICE THIS VISIT: CPT | Performed by: STUDENT IN AN ORGANIZED HEALTH CARE EDUCATION/TRAINING PROGRAM

## 2021-11-10 NOTE — PROGRESS NOTES
[facility] -  Case Progress Note    Date of Service: 11/10/21   Name: Ramila Barnes)  Gender Identity: Non-binary/Agender  Pronouns: They/them  : 1995  Medical Record Number: [id number]  Treating Provider: Nabil Bahena, PhD  Type of Session: Individual  Present in Session: Nabil Ragland  Number of Minutes: 21  Video start time: 4:01pm  Video end time: 4:22pm    Telemedicine Visit: The patient's condition can be safely assessed and treated via synchronous audio and visual telemedicine encounter.       Reason for Telemedicine Visit: Services only offered telehealth     Originating Site (Patient Location): Patient's home     Distant Site (Provider Location): Provider Remote Setting     Consent:  The patient/guardian has verbally consented to: the potential risks and benefits of telemedicine (video visit) versus in person care; bill my insurance or make self-payment for services provided; and responsibility for payment of non-covered services.      Mode of Communication:  Video Conference via Doxy due to tech issues.     As the provider I attest to compliance with applicable laws and regulations related to telemedicine.    Health Maintenance   Topic Date Due     MENTAL HEALTH TX PLAN  2022     Current Symptoms/Status:  Client has an established history of experiencing gender dysphoria, psychological distress stemming from incongruence with gender identity and sex assigned at birth exacerbated by primary and secondary sex characteristics. Client reported experiencing the above-mentioned symptoms as well as several depressive symptoms (sadness, fatigue, low energy and interest in pleasurable activities, low motivation, sleep disturbance: difficulty stay asleep and waking up several times in the middle of the night). Client reports ongoing concerns related to gender dysphoria and navigating interpersonal relationships. Client reported a history of family trauma related to substance use and  addiction, difficulties being accepted as agender/non-binary by family, and navigating societal expectations of relationships.     Progress Toward Treatment Goals:   Client reported making progress in their interpersonal relationships but struggling with symptoms of depression, anxiety, and gender dysphoria. Client expressed commitment to working toward their treatment goals: (1) emotionally work through transition-related issues (prepare for top surgery, letters, etc.), (2) improve communication in relationship (open discussion of boundaries, etc.), and (3) learn new skills to cope with feelings of gender dysphoria. Client had top surgery on 9/17/2021. In this session, client focused on improving their awareness of their emotional and physical needs, while also identifying coping skills and strategies.    Intervention: Modality and Description:   Client reported continuing to feel depressed and anxious, which has been impacting their sleep. They reported getting only about 3 hours of sleep per night and wanting to resume medications that help with their sleep. They reported recovering well from top surgery and feeling less pain and gaining confidence in the way clothes fits now, which has lessened gender dysphoria. However, they reported still having physical l limitations and not being able to do routine tasks that they are used to, but these symptoms have lessened. Therapist provided support and validation and helped client think through recovery plans. Therapist used emotion-focused strategies and CBT to help client process concerns about gender and bodily dysphoria. Session ended early due to client feeling tired.    Response to Intervention:  Client was open and receptive to strategies and interventions used in session. Client stated they would like to continue working toward noticing their symptoms and communicating them to their psychiatrist. Client was open to developing more self-care strategies to promote  "their own wellness.     Assignment:  - Client will continue to work toward a healthy post-op recovery.    Interactive Complexity:  Not applicable.    DSM-5 Diagnoses:  302.85 (F64.0) Gender Dysphoria in adolescent and adult  296.32 (F33.1) Major Depressive Disorder, Recurrent, Moderate  300.02 (F41.1) Generalized Anxiety Disorder    Plan/Need for Future Services:  Return for therapy in 2 weeks to treat diagnosed problems.        Nabil Bahena, PhD  Postdoctoral Fellow       _______________________________         TREATMENT PLAN  Date of Treatment Plan: 2021  Name: Ramila Woods \"Obie\"  : 1995  Medical Record Number: [id number]  Treating Provider: Nabil Bahena,  Type of Session: Individual  Present in Session: Nabil Ragland    Current Status:  Depression/Mood:  Sadness  Increased crying  Decreased interest  Decreased appetite  Sleep irregularities  Low energy  Low self-esteem/guilt  Irritability  Decreased concentration/indecision  Rumination  Dysphoria     Anxiety/Panic:  Worry/Anxiety  GI Distress  Social Fear  Dizziness  Physiological reactivity     Thought:   Distractible  Racing Thoughts     Sensorium:  Paranoia     Behavior/Health:  Increase in goal directed activities  Occupational problems     Chemical Use:  None     Sexual Problems: (Note: client identifies as asexual)  Low Desire  Sexual Aversion     Gender concerns:  Gender dysphoria  Body dysphoria  Social dysphoria     Suicide Risk Assessment:  Assessed Level of Immediate Risk:  None  Ideation:  NO  Plan:  NO   Means:  NO  Intent:  NO     Homicide Risk Assessment:  Assessed Level of Immediate Risk:  None  Ideation:  NO  Plan:  NO  Means:  NO  Intent:  NO     Impact of Symptoms on Function:  Physical/Health  Social/Family  Work     DSM-5 Diagnoses:   302.6 (F64.0) Gender Dysphoria in adolescent and adult  300.02 (F41.1) Generalized Anxiety Disorder  296.32 (F33.1) Major Depressive Disorder, Recurrent, Moderate     Screening " Questionnaires:  Completed the following screening questionnaires:  PHQ-9: Score = 11, indicating moderate depression.  LOVE-7: Score = 12, indicating moderate anxiety     Problem(s):  1. Persistent gender dysphoria   2. Symptoms of anxiety and depression  3. Relationship difficulties  4. Decreased desire in doing things     Short-Long Term Goals  (1) emotionally work through transition-related issues (prepare for top surgery, letters, etc.)  (2) improve communication in relationship (open discussion of boundaries, etc.)  (3) learn new skills to cope with feelings of gender dysphoria     Interventions  Emotion-Focused Techniques  Cognitive-Behavior Therapy     Expected Outcomes and Prognosis  Return to normal functioning     Frequency of Sessions  Weekly sessions for now, biweekly in June     Current Psychoactive Medications:  None  Discharge & Aftercare Goals: TBD  Care Coordination: No     Consent to Treatment:   Patient participated in this treatment planning process and indicated verbal agreement with the above treatment plan.  If patient doesn't sign, indicate why: Telehealth visit due to COVID19 pandemic

## 2021-11-24 ENCOUNTER — TELEPHONE (OUTPATIENT)
Dept: OBGYN | Facility: CLINIC | Age: 26
End: 2021-11-24
Payer: COMMERCIAL

## 2021-11-24 NOTE — TELEPHONE ENCOUNTER
[facility] Call Center    Phone Message    May a detailed message be left on voicemail: yes     Reason for Call: Other: Pt has questions, and would like procedure scheduled for menstral suppression. Please call pt to discuss. Thanks     Action Taken: Other: whs    Travel Screening: Not Applicable

## 2021-11-26 NOTE — TELEPHONE ENCOUNTER
Spoke with patient, requesting an IUD to be placed (not actual surgery), scheduled for 1/3/22 with Dr. Fritz.    Yahaira Burgess  Clinical Services Assistant

## 2021-12-08 ENCOUNTER — VIRTUAL VISIT (OUTPATIENT)
Dept: PSYCHOLOGY | Facility: CLINIC | Age: 26
End: 2021-12-08
Payer: COMMERCIAL

## 2021-12-08 DIAGNOSIS — F41.1 GENERALIZED ANXIETY DISORDER: ICD-10-CM

## 2021-12-08 DIAGNOSIS — F33.1 MAJOR DEPRESSIVE DISORDER, RECURRENT EPISODE, MODERATE (H): ICD-10-CM

## 2021-12-08 DIAGNOSIS — F64.0 GENDER DYSPHORIA IN ADOLESCENT AND ADULT: Primary | ICD-10-CM

## 2021-12-08 PROCEDURE — 90834 PSYTX W PT 45 MINUTES: CPT | Mod: 95 | Performed by: STUDENT IN AN ORGANIZED HEALTH CARE EDUCATION/TRAINING PROGRAM

## 2021-12-08 PROCEDURE — 99207 PR NO BILLABLE SERVICE THIS VISIT: CPT | Performed by: STUDENT IN AN ORGANIZED HEALTH CARE EDUCATION/TRAINING PROGRAM

## 2021-12-08 NOTE — PROGRESS NOTES
[facility] -  Case Progress Note    Date of Service: 21   Name: Ramila Barnes)  Gender Identity: Non-binary/Agender  Pronouns: They/them  : 1995  Medical Record Number: [id number]  Treating Provider: Nabil Bahena, PhD  Type of Session: Individual  Present in Session: Nabil Ragland  Number of Minutes: 43  Video start time: 4:00pm  Video end time: 4:43pm    Telemedicine Visit: The patient's condition can be safely assessed and treated via synchronous audio and visual telemedicine encounter.       Reason for Telemedicine Visit: Services only offered telehealth     Originating Site (Patient Location): Patient's home     Distant Site (Provider Location): Provider Remote Setting     Consent:  The patient/guardian has verbally consented to: the potential risks and benefits of telemedicine (video visit) versus in person care; bill my insurance or make self-payment for services provided; and responsibility for payment of non-covered services.      Mode of Communication:  Video Conference via Doxy due to tech issues.     As the provider I attest to compliance with applicable laws and regulations related to telemedicine.    Health Maintenance   Topic Date Due     MENTAL HEALTH TX PLAN  2022     Current Symptoms/Status:  Client has an established history of experiencing gender dysphoria, psychological distress stemming from incongruence with gender identity and sex assigned at birth exacerbated by primary and secondary sex characteristics. Client reported experiencing the above-mentioned symptoms as well as several depressive symptoms (sadness, fatigue, low energy and interest in pleasurable activities, low motivation, sleep disturbance: difficulty stay asleep and waking up several times in the middle of the night). Client reports ongoing concerns related to gender dysphoria and navigating interpersonal relationships. Client reported a history of family trauma related to substance use and  addiction, difficulties being accepted as agender/non-binary by family, and navigating societal expectations of relationships.     Progress Toward Treatment Goals:   Client reported making progress in their interpersonal relationships but struggling with symptoms of depression, anxiety, and gender dysphoria. Client expressed commitment to working toward their treatment goals: (1) emotionally work through transition-related issues (prepare for top surgery, letters, etc.), (2) improve communication in relationship (open discussion of boundaries, etc.), and (3) learn new skills to cope with feelings of gender dysphoria. Client had top surgery on 9/17/2021. In this session, client focused on improving their awareness of their emotional and physical needs, while also identifying coping skills and strategies.    Intervention: Modality and Description:   Client reported experiencing worsening gender dysphoria, depression, and anxiety. They identify as asexual and reported recently trying to experiment with sexual touching and intimacy. Their partner is interested in having sex, but they feel repulsed by sex. Over the past month, they have been trying to explore sexual intimacy as a way to work through their partner's needs. They also shared that after engaging in sexual touching, they felt confused and upset, which was experienced as a rejection by their partner. This session focused on processing their feelings of sexual intimacy, gender dysphoria triggered by gender expectations, and relationships dynamics. We worked on thinking through clear and consistent communication to set and maintain boundaries so that they feel safe and respected in their relationship. Therapist provided support and validation and helped client think through recovery plans. Therapist used emotion-focused strategies and CBT to help client process concerns about gender and bodily dysphoria. Session ended early due to client feeling  "tired.    Response to Intervention:  Client was open and receptive to strategies and interventions used in session. Client stated they would like to continue working toward noticing their symptoms and communicating them to their psychiatrist. Client was open to developing more self-care strategies to promote their own wellness.     Assignment:  - None    Interactive Complexity:  Not applicable.    DSM-5 Diagnoses:  302.85 (F64.0) Gender Dysphoria in adolescent and adult  296.32 (F33.1) Major Depressive Disorder, Recurrent, Moderate  300.02 (F41.1) Generalized Anxiety Disorder    Plan/Need for Future Services:  Return for therapy in 2 weeks to treat diagnosed problems.        Nabil Bahena, PhD  Postdoctoral Fellow       _______________________________         TREATMENT PLAN  Date of Treatment Plan: 2021  Name: Ramila Woods \"Obie\"  : 1995  Medical Record Number: [id number]  Treating Provider: Nabil Bahena,  Type of Session: Individual  Present in Session: Nabil Ragland    Current Status:  Depression/Mood:  Sadness  Increased crying  Decreased interest  Decreased appetite  Sleep irregularities  Low energy  Low self-esteem/guilt  Irritability  Decreased concentration/indecision  Rumination  Dysphoria     Anxiety/Panic:  Worry/Anxiety  GI Distress  Social Fear  Dizziness  Physiological reactivity     Thought:   Distractible  Racing Thoughts     Sensorium:  Paranoia     Behavior/Health:  Increase in goal directed activities  Occupational problems     Chemical Use:  None     Sexual Problems: (Note: client identifies as asexual)  Low Desire  Sexual Aversion     Gender concerns:  Gender dysphoria  Body dysphoria  Social dysphoria     Suicide Risk Assessment:  Assessed Level of Immediate Risk:  None  Ideation:  NO  Plan:  NO   Means:  NO  Intent:  NO     Homicide Risk Assessment:  Assessed Level of Immediate Risk:  None  Ideation:  NO  Plan:  NO  Means:  NO  Intent:  NO     Impact of Symptoms on " Function:  Physical/Health  Social/Family  Work     DSM-5 Diagnoses:   302.6 (F64.0) Gender Dysphoria in adolescent and adult  300.02 (F41.1) Generalized Anxiety Disorder  296.32 (F33.1) Major Depressive Disorder, Recurrent, Moderate     Screening Questionnaires:  Completed the following screening questionnaires:  PHQ-9: Score = 11, indicating moderate depression.  LOVE-7: Score = 12, indicating moderate anxiety     Problem(s):  1. Persistent gender dysphoria   2. Symptoms of anxiety and depression  3. Relationship difficulties  4. Decreased desire in doing things     Short-Long Term Goals  (1) emotionally work through transition-related issues (prepare for top surgery, letters, etc.)  (2) improve communication in relationship (open discussion of boundaries, etc.)  (3) learn new skills to cope with feelings of gender dysphoria     Interventions  Emotion-Focused Techniques  Cognitive-Behavior Therapy     Expected Outcomes and Prognosis  Return to normal functioning     Frequency of Sessions  Weekly sessions for now, biweekly in June     Current Psychoactive Medications:  None  Discharge & Aftercare Goals: TBD  Care Coordination: No     Consent to Treatment:   Patient participated in this treatment planning process and indicated verbal agreement with the above treatment plan.  If patient doesn't sign, indicate why: Telehealth visit due to COVID19 pandemic

## 2021-12-30 ENCOUNTER — NURSE TRIAGE (OUTPATIENT)
Dept: NURSING | Facility: CLINIC | Age: 26
End: 2021-12-30
Payer: COMMERCIAL

## 2021-12-30 NOTE — TELEPHONE ENCOUNTER
"RN Triage:    Is scheduled for a IUD insertion for menstrual cycle suppression on 1/3/21 at 4:15 pm through [facility].    Another unrelated procedure on neck has been recommended by the [facility]\", and she is calling today to inquire whether she can have both procedures done on the same day.    She is a poor historian and does not have much information about either procedure.    The neck procedure has to do with \"inserting needles into neck joints to numb the area\".  She has been told that the numbness should wear off in about 3-4 hours.  Neck procedure has not been scheduled yet as she is waiting to hear if both can be done on the same day.    Primary care done through [facility], but [facility] has been recommended for the IUD insertion.    Request:  Please call Obie back at [phone]    Marleny Roach RN 12/30/21 2:38 PM  [facility] Nurse Advisor          "

## 2022-01-04 ENCOUNTER — TELEPHONE (OUTPATIENT)
Dept: OBGYN | Facility: CLINIC | Age: 27
End: 2022-01-04
Payer: COMMERCIAL

## 2022-01-04 DIAGNOSIS — F41.1 GAD (GENERALIZED ANXIETY DISORDER): Primary | ICD-10-CM

## 2022-01-04 RX ORDER — DIAZEPAM 5 MG
10 TABLET ORAL ONCE
Qty: 2 TABLET | Refills: 0 | Status: SHIPPED | OUTPATIENT
Start: 2022-01-04 | End: 2022-01-04

## 2022-01-04 NOTE — TELEPHONE ENCOUNTER
Message received regarding upcoming IUD placement and request for Valium pre procedure. Rx sent to preferred pharmacy by Dr. Wang.    Called and spoke with patient. Informed her that rx was sent. Instructed her to come to clinic at 1000 1/25 to sign consent prior to Valium administration. Pt confirmed she will have support person with her/ home. Instructed patient to  rx and bring with to clinic visit and not to take prior to signing consent.     Pt verbalized understanding and agreement, denied further questions/concerns.

## 2022-01-11 ENCOUNTER — VIRTUAL VISIT (OUTPATIENT)
Dept: PSYCHOLOGY | Facility: CLINIC | Age: 27
End: 2022-01-11
Payer: COMMERCIAL

## 2022-01-11 DIAGNOSIS — F33.1 MAJOR DEPRESSIVE DISORDER, RECURRENT EPISODE, MODERATE (H): ICD-10-CM

## 2022-01-11 DIAGNOSIS — F64.0 GENDER DYSPHORIA IN ADOLESCENT AND ADULT: Primary | ICD-10-CM

## 2022-01-11 DIAGNOSIS — F41.1 GENERALIZED ANXIETY DISORDER: ICD-10-CM

## 2022-01-11 PROCEDURE — 90834 PSYTX W PT 45 MINUTES: CPT | Mod: 95 | Performed by: STUDENT IN AN ORGANIZED HEALTH CARE EDUCATION/TRAINING PROGRAM

## 2022-01-11 PROCEDURE — 99207 PR NO BILLABLE SERVICE THIS VISIT: CPT | Performed by: STUDENT IN AN ORGANIZED HEALTH CARE EDUCATION/TRAINING PROGRAM

## 2022-01-11 NOTE — PROGRESS NOTES
[facility] -  Case Progress Note    Date of Service: 22   Name: Ramila Barnes)  Gender Identity: Non-binary/Agender  Pronouns: They/them  : 1995  Medical Record Number: [id number]  Treating Provider: Nabil Bahena, PhD  Type of Session: Individual  Present in Session: Nabil Ragland  Number of Minutes: 40  Video start time: 4:04pm  Video end time: 4:44pm    Telemedicine Visit: The patient's condition can be safely assessed and treated via synchronous audio and visual telemedicine encounter.       Reason for Telemedicine Visit: Services only offered telehealth     Originating Site (Patient Location): Patient's home     Distant Site (Provider Location): Provider Remote Setting     Consent:  The patient/guardian has verbally consented to: the potential risks and benefits of telemedicine (video visit) versus in person care; bill my insurance or make self-payment for services provided; and responsibility for payment of non-covered services.      Mode of Communication:  Video Conference via Doxy due to tech issues.     As the provider I attest to compliance with applicable laws and regulations related to telemedicine.    Health Maintenance   Topic Date Due     MENTAL HEALTH TX PLAN  2022     Current Symptoms/Status:  Client has an established history of experiencing gender dysphoria, psychological distress stemming from incongruence with gender identity and sex assigned at birth exacerbated by primary and secondary sex characteristics. Client reported experiencing the above-mentioned symptoms as well as several depressive symptoms (sadness, fatigue, low energy and interest in pleasurable activities, low motivation, sleep disturbance: difficulty stay asleep and waking up several times in the middle of the night). Client reports ongoing concerns related to gender dysphoria and navigating interpersonal relationships. Client reported a history of family trauma related to substance use and  "addiction, difficulties being accepted as agender/non-binary by family, and navigating societal expectations of relationships.     Progress Toward Treatment Goals:   Client reported making progress in their interpersonal relationships but struggling with symptoms of depression, anxiety, and gender dysphoria. Client expressed commitment to working toward their treatment goals: (1) emotionally work through transition-related issues (prepare for top surgery, letters, etc.), (2) improve communication in relationship (open discussion of boundaries, etc.), and (3) learn new skills to cope with feelings of gender dysphoria. Client had top surgery on 9/17/2021. In this session, client focused on improving their awareness of their emotional and physical needs, while also identifying coping skills and strategies.    Intervention: Modality and Description:   Client reported hugo covid and quarantining at home through recovery. They have been experiencing ongoing gender dysphoria, depression, and anxiety. They reported wanting to focus on being more assertive and learning to communicate their needs in their relationship with their partner, particularly around sexual intimacy, boundaries, and triggers for dysphoria. We explored their fears about their partner leaving them if they seem to be \"too needy\" or \"demanding,\" which seem to be informed by childhood experiences where their needs were invalidated and dismissed. This session focused on processing these feelings, challenging these maladaptive beliefs, and identifying ways to communicate their needs to their partner.  We worked on thinking through clear and consistent communication to set and maintain boundaries so that they feel safe and respected in their relationship. Therapist provided support and validation. Therapist used emotion-focused strategies and CBT to help client process concerns about gender and bodily dysphoria. Session ended early due to client feeling " "tired.    Response to Intervention:  Client was open and receptive to strategies and interventions used in session. Client stated they would like to continue working toward noticing their symptoms and communicating them to their psychiatrist. Client was open to developing more self-care strategies to promote their own wellness.     Assignment:  - None    Interactive Complexity:  Not applicable.    DSM-5 Diagnoses:  302.85 (F64.0) Gender Dysphoria in adolescent and adult  296.32 (F33.1) Major Depressive Disorder, Recurrent, Moderate  300.02 (F41.1) Generalized Anxiety Disorder    Plan/Need for Future Services:  Return for therapy in 1 month to treat diagnosed problems.        Nabil Bahena, PhD  Postdoctoral Fellow       _______________________________         TREATMENT PLAN  Date of Treatment Plan: 2021  Name: Ramila Woods \"Obie\"  : 1995  Medical Record Number: [id number]  Treating Provider: Nabil Bahena,  Type of Session: Individual  Present in Session: Nabil Ragland    Current Status:  Depression/Mood:  Sadness  Increased crying  Decreased interest  Decreased appetite  Sleep irregularities  Low energy  Low self-esteem/guilt  Irritability  Decreased concentration/indecision  Rumination  Dysphoria     Anxiety/Panic:  Worry/Anxiety  GI Distress  Social Fear  Dizziness  Physiological reactivity     Thought:   Distractible  Racing Thoughts     Sensorium:  Paranoia     Behavior/Health:  Increase in goal directed activities  Occupational problems     Chemical Use:  None     Sexual Problems: (Note: client identifies as asexual)  Low Desire  Sexual Aversion     Gender concerns:  Gender dysphoria  Body dysphoria  Social dysphoria     Suicide Risk Assessment:  Assessed Level of Immediate Risk:  None  Ideation:  NO  Plan:  NO   Means:  NO  Intent:  NO     Homicide Risk Assessment:  Assessed Level of Immediate Risk:  None  Ideation:  NO  Plan:  NO  Means:  NO  Intent:  NO     Impact of Symptoms on " Function:  Physical/Health  Social/Family  Work     DSM-5 Diagnoses:   302.6 (F64.0) Gender Dysphoria in adolescent and adult  300.02 (F41.1) Generalized Anxiety Disorder  296.32 (F33.1) Major Depressive Disorder, Recurrent, Moderate     Screening Questionnaires:  Completed the following screening questionnaires:  PHQ-9: Score = 11, indicating moderate depression.  LOVE-7: Score = 12, indicating moderate anxiety     Problem(s):  1. Persistent gender dysphoria   2. Symptoms of anxiety and depression  3. Relationship difficulties  4. Decreased desire in doing things     Short-Long Term Goals  (1) emotionally work through transition-related issues (prepare for top surgery, letters, etc.)  (2) improve communication in relationship (open discussion of boundaries, etc.)  (3) learn new skills to cope with feelings of gender dysphoria     Interventions  Emotion-Focused Techniques  Cognitive-Behavior Therapy     Expected Outcomes and Prognosis  Return to normal functioning     Frequency of Sessions  Weekly sessions for now, biweekly in June     Current Psychoactive Medications:  None  Discharge & Aftercare Goals: TBD  Care Coordination: No     Consent to Treatment:   Patient participated in this treatment planning process and indicated verbal agreement with the above treatment plan.  If patient doesn't sign, indicate why: Telehealth visit due to COVID19 pandemic

## 2022-02-19 ENCOUNTER — HEALTH MAINTENANCE LETTER (OUTPATIENT)
Age: 27
End: 2022-02-19

## 2022-02-21 ENCOUNTER — OFFICE VISIT (OUTPATIENT)
Dept: OBGYN | Facility: CLINIC | Age: 27
End: 2022-02-21
Attending: ADVANCED PRACTICE MIDWIFE
Payer: COMMERCIAL

## 2022-02-21 ENCOUNTER — VIRTUAL VISIT (OUTPATIENT)
Dept: PSYCHOLOGY | Facility: CLINIC | Age: 27
End: 2022-02-21
Payer: COMMERCIAL

## 2022-02-21 VITALS
SYSTOLIC BLOOD PRESSURE: 101 MMHG | DIASTOLIC BLOOD PRESSURE: 64 MMHG | HEIGHT: 66 IN | WEIGHT: 150.5 LBS | HEART RATE: 60 BPM | BODY MASS INDEX: 24.19 KG/M2

## 2022-02-21 DIAGNOSIS — F64.0 GENDER DYSPHORIA IN ADOLESCENT AND ADULT: ICD-10-CM

## 2022-02-21 DIAGNOSIS — Z53.8 UNSUCCESSFUL ATTEMPT TO INSERT INTRAUTERINE DEVICE (IUD): Primary | ICD-10-CM

## 2022-02-21 DIAGNOSIS — F33.1 MAJOR DEPRESSIVE DISORDER, RECURRENT EPISODE, MODERATE (H): Primary | ICD-10-CM

## 2022-02-21 DIAGNOSIS — F41.1 GENERALIZED ANXIETY DISORDER: ICD-10-CM

## 2022-02-21 DIAGNOSIS — F41.1 GAD (GENERALIZED ANXIETY DISORDER): ICD-10-CM

## 2022-02-21 DIAGNOSIS — F43.12 CHRONIC POST-TRAUMATIC STRESS DISORDER (PTSD): ICD-10-CM

## 2022-02-21 DIAGNOSIS — N94.89 SUPPRESSION OF MENSES: ICD-10-CM

## 2022-02-21 DIAGNOSIS — F33.2 SEVERE EPISODE OF RECURRENT MAJOR DEPRESSIVE DISORDER, WITHOUT PSYCHOTIC FEATURES (H): ICD-10-CM

## 2022-02-21 PROCEDURE — 99207 PR NO BILLABLE SERVICE THIS VISIT: CPT | Performed by: STUDENT IN AN ORGANIZED HEALTH CARE EDUCATION/TRAINING PROGRAM

## 2022-02-21 PROCEDURE — G0463 HOSPITAL OUTPT CLINIC VISIT: HCPCS

## 2022-02-21 PROCEDURE — 90834 PSYTX W PT 45 MINUTES: CPT | Mod: 95 | Performed by: STUDENT IN AN ORGANIZED HEALTH CARE EDUCATION/TRAINING PROGRAM

## 2022-02-21 PROCEDURE — 99214 OFFICE O/P EST MOD 30 MIN: CPT | Performed by: ADVANCED PRACTICE MIDWIFE

## 2022-02-21 ASSESSMENT — PATIENT HEALTH QUESTIONNAIRE - PHQ9
5. POOR APPETITE OR OVEREATING: NEARLY EVERY DAY
SUM OF ALL RESPONSES TO PHQ QUESTIONS 1-9: 22

## 2022-02-21 ASSESSMENT — ANXIETY QUESTIONNAIRES
7. FEELING AFRAID AS IF SOMETHING AWFUL MIGHT HAPPEN: NEARLY EVERY DAY
6. BECOMING EASILY ANNOYED OR IRRITABLE: SEVERAL DAYS
2. NOT BEING ABLE TO STOP OR CONTROL WORRYING: NEARLY EVERY DAY
5. BEING SO RESTLESS THAT IT IS HARD TO SIT STILL: SEVERAL DAYS
3. WORRYING TOO MUCH ABOUT DIFFERENT THINGS: MORE THAN HALF THE DAYS
GAD7 TOTAL SCORE: 15
1. FEELING NERVOUS, ANXIOUS, OR ON EDGE: MORE THAN HALF THE DAYS

## 2022-02-21 NOTE — PROGRESS NOTES
PHQ 2/13/2020 1/20/2021 2/21/2022   PHQ-9 Total Score 21 11 22   Q9: Thoughts of better off dead/self-harm past 2 weeks Not at all Not at all More than half the days     LOVE-7 SCORE 1/20/2021 8/11/2021 2/21/2022   Total Score 4 11 15     SUBJECTIVE:  Ramila Woods is a 27 year old nonbinary adult, who uses they/them pronouns,  who presents today for her menses suppression w Mirena IUD and first pap smear  Took 10 mg of valium at the beginning of the visit, has been very anxious anticipating this procedure.  HPI: no specific concerns today   -worried about first pelvic/spec exam, has never had anything in their vagina  - meds are not helping as much as before, looking for access to psychiatry. Sees therapist regularly  -High Phq-9- no plans for self harm, knows resources, call help line last week, support from partner Karla  -heavy painful menses  -needs first pap  -wants to pursue small amount of  HRT, looking for limited change like lowered voice, recent top surgery.  -counseled on R/B/A of Mirena IUD  MENSTRUAL HISTORY  ==================  OB History   No obstetric history on file.      Patient's last menstrual period was 02/09/2022.   Periods are regular q 28-30 days,  Dysmenorrhea severe, occurring throughout menses. Cyclic symptoms include  NONE and cramping/pelvic pain. Additional symptoms include NONE    SEXUAL HISTORY  ==============  Current contraception: none needed  Number of partners in last year: 1      CURRENT LIFE STYLE:  ===================  Social History     Socioeconomic History     Marital status: Single     Spouse name: Not on file     Number of children: Not on file     Years of education: Not on file     Highest education level: Not on file   Occupational History     Not on file   Tobacco Use     Smoking status: Passive Smoke Exposure - Never Smoker     Smokeless tobacco: Never Used     Tobacco comment: 2nd hand smoke exposure years ago   Substance and Sexual Activity     Alcohol use:  Never     Drug use: Never     Sexual activity: Not on file   Other Topics Concern     Not on file   Social History Narrative     Not on file     Social Determinants of Health     Financial Resource Strain: Not on file   Food Insecurity: Not on file   Transportation Needs: Not on file   Physical Activity: Not on file   Stress: Not on file   Social Connections: Not on file   Intimate Partner Violence: Not At Risk     Fear of Current or Ex-Partner: No     Emotionally Abused: No     Physically Abused: No     Sexually Abused: No   Housing Stability: Not on file     SCREENING  ===========  Health Maintenance   Topic Date Due     PREVENTIVE CARE VISIT  Never done     ASTHMA ACTION PLAN  Never done     ASTHMA CONTROL TEST  Never done     ADVANCE CARE PLANNING  Never done     DEPRESSION ACTION PLAN  Never done     Pneumococcal Vaccine: Pediatrics (0 to 5 Years) and At-Risk Patients (6 to 64 Years) (1 of 2 - PPSV23) Never done     HIV SCREENING  Never done     HEPATITIS C SCREENING  Never done     PAP  Never done     MENTAL HEALTH TX PLAN  03/14/2022     PHQ-9  08/21/2022     LOVE ASSESSMENT  02/21/2023     DTAP/TDAP/TD IMMUNIZATION (4 - Td or Tdap) 12/20/2027     INFLUENZA VACCINE  Completed     HEPATITIS B IMMUNIZATION  Completed     COVID-19 Vaccine  Completed     IPV IMMUNIZATION  Aged Out     MENINGITIS IMMUNIZATION  Aged Out     No results found for: PAP       No results found for: CHOL  No results found for: HDL  No results found for: LDL  No results found for: TSH    IMMUNIZATIONS  =============  Immunization History   Administered Date(s) Administered     COVID-19,PF,Moderna 12/15/2021     DTP-Hib 1995, 1995, 1995, 10/05/1995     DTaP, Unspecified 12/20/2017     HPV9 08/14/2019, 09/22/2020     Hep B, Peds or Adolescent 1994, 1994, 08/02/1996     Influenza Vaccine IM > 6 months Valent IIV4 (Alfuria,Fluzone) 01/23/2019, 09/22/2020     MMR 10/05/1995     OPV, unspecified 1994,  "1994, 1994     Tdap (Adult) Unspecified Formulation 07/11/2014     HISTORIES  =========     Allergies   Allergen Reactions     Dust Mites      Seasonal Allergies      Family History   Problem Relation Age of Onset     Lupus Mother      Depression Mother      Lupus Maternal Grandmother      Depression Father      No Known Problems Sister      No Known Problems Brother      No Known Problems Brother      No Known Problems Sister      No past medical history on file.  Past Surgical History:   Procedure Laterality Date     TRANSGENDER MASTECTOMY Bilateral 9/17/2021    Procedure: MASTECTOMY, BILATERAL, SIMPLE, nipple grafts. OnQ.;  Surgeon: Radha Tan MD;  Location: [facility] OR     REVIEW OF SYSTEMS  ==================   ROS: 10 point ROS neg other than the symptoms noted above in the HPI.    EXAM  =========  /64   Pulse 60   Ht 1.676 m (5' 6\")   Wt 68.3 kg (150 lb 8 oz)   LMP 02/09/2022   BMI 24.29 kg/m    BMI: Body mass index is 24.29 kg/m .  GENERAL APPEARANCE: healthy, alert and no distress  PSYCH: mentation appears normal, affect normal/bright, anxious and worried about how manage exam today, has never had pelvic exam  MENTAL STATUS EXAM:  Appearance/Behavior: Casually groomed and Dressed appropriately for weather  Speech: Normal  Mood/Affect: normal affect and some anxiety, very self aware and able to articulate concerns clearly and appropriately  Insight: Adequate    PELVIC EXAM:  External Genitalia: WNL  Bartholin's/Urethral Meatus/Wilmore's (BUS):  WNL/Negative and unable to evaluate completely  Vagina:  Unable to exam d/t complete hymnal ring intact, did not allow one finger to penetrate past hymnal ring.  Anus/Perineum:  Intact        ASSESSMENT  ===========  Gender dysphoria  Need for pap smear  Desires menses suppression w Mirena IUD  Depression/anxiety       PLAN  ===========  Discussed findings and inablility to move forward with speculum placement for procedure  Discussed " option of IUD and pap under sedation w GYN MD team-will move forward to schedule  Discussed continuing with care w Dr. Mackey for testosterone treatment, gave number to schedule, likely phone visit  Discussed Phq9 and Gad7 and concerns, pt denies plan for self harm, seeing therapist. Referral placed for [facility] psych for med management  (F43.12) Chronic post-traumatic stress disorder (PTSD)  (primary encounter diagnosis)      (F33.2) Severe episode of recurrent major depressive disorder, without psychotic features (H)    (F41.1) LOVE (generalized anxiety disorder)      (F64.0) Gender dysphoria in adolescent and adult    Unsuccessful attempt to insert IUD      PATIENT EDUCATION  =================  1.  Additional teaching done at this visit included: birth control and mental health  Piedad Baum, DNP, APRN, CNM, FACNM

## 2022-02-21 NOTE — PROGRESS NOTES
[facility] -  Case Progress Note    Date of Service: 22   Name: Ramila Barnes)  Gender Identity: Non-binary/Agender  Pronouns: They/them  : 1995  Medical Record Number: [id number]  Treating Provider: Nabil Bahena, PhD  Type of Session: Individual  Present in Session: Nabil Ragland  Number of Minutes: 51  Video start time:11:00am  Video end time: 11:51am    Telemedicine Visit: The patient's condition can be safely assessed and treated via synchronous audio and visual telemedicine encounter.       Reason for Telemedicine Visit: Services only offered telehealth     Originating Site (Patient Location): Patient's home     Distant Site (Provider Location): Provider Remote Setting     Consent:  The patient/guardian has verbally consented to: the potential risks and benefits of telemedicine (video visit) versus in person care; bill my insurance or make self-payment for services provided; and responsibility for payment of non-covered services.      Mode of Communication:  Video Conference via Doxy due to tech issues.     As the provider I attest to compliance with applicable laws and regulations related to telemedicine.    Health Maintenance   Topic Date Due     MENTAL HEALTH TX PLAN  2022     Current Symptoms/Status:  Client has an established history of experiencing gender dysphoria, psychological distress stemming from incongruence with gender identity and sex assigned at birth exacerbated by primary and secondary sex characteristics. Client reported experiencing the above-mentioned symptoms as well as several depressive symptoms (sadness, fatigue, low energy and interest in pleasurable activities, low motivation, sleep disturbance: difficulty stay asleep and waking up several times in the middle of the night). Client reports ongoing concerns related to gender dysphoria and navigating interpersonal relationships. Client reported a history of family trauma related to substance use and  addiction, difficulties being accepted as agender/non-binary by family, and navigating societal expectations of relationships.     Progress Toward Treatment Goals:   Client reported making progress in their interpersonal relationships but struggling with symptoms of depression, anxiety, and gender dysphoria. Client expressed commitment to working toward their treatment goals: (1) emotionally work through transition-related issues (prepare for top surgery, letters, etc.), (2) improve communication in relationship (open discussion of boundaries, etc.), and (3) learn new skills to cope with feelings of gender dysphoria. Client had top surgery on 9/17/2021.    In this session, client focused on processing depressive symptoms and care coordination.    Intervention: Modality and Description:   Client reported experiencing ongoing depression, anxiety, and gender dysphoria. Client reported experiencing suicidal ideation (without intent or plan) and contacting a suicide hotline for support. This session focused on safety planning, discussing options for care to improve social supports, and determining level of care needed. Client reported wanting to meet with a psychiatrist for medication management to help reduce symptoms and the need for DBT treatment to improve coping skills and distress tolerance. Writer provided some referral options ([facility]; [facility] for [facility]BT) and information for other providers for them to contact. We also discussed some of the events that triggered the client's depressive episode. Client reported having violent fantasies about self-harm and sending out suicide notes to several people to make them feel guilty about their behavior. We explored these fantasies, thoughts of self-harm, and recent triggers that instigated this depressive episode.     Therapist conducted a risk assessment and client denied any intent or plan to carry out any of these actions. Client has a  "history of non-suicidal self-injurious behavior (biting self without breaking skin, pulling hair, pinching and squeezing skin). Their most recent instance of this was a week ago and overall, they do not engage in this behavior regularly.   Client also does not have a history of violent behavior Therapist provided support and validation. Therapist used emotion-focused strategies and DBT to help client process recent stressors and self-injurious behavior. We decided to switch from monthly to biweekly sessions until symptoms lessen.     Response to Intervention:  Client was open and receptive to strategies and interventions used in session. Client stated they would like to continue working toward noticing their symptoms and communicating them to their psychiatrist. Client was open to developing more self-care strategies to promote their own wellness.     Assignment:  - None    Interactive Complexity:  Not applicable.    DSM-5 Diagnoses:  296.32 (F33.1) Major Depressive Disorder, Recurrent, Moderate  300.02 (F41.1) Generalized Anxiety Disorder  302.85 (F64.0) Gender Dysphoria in adolescent and adult    Plan/Need for Future Services:  Return for therapy in 2 weeks to treat diagnosed problems.        Nabil Bahena, PhD  Postdoctoral Fellow       _______________________________         TREATMENT PLAN  Date of Treatment Plan: 2021  Name: Ramila S Peggy \"Obie\"  : 1995  Medical Record Number: [id number]  Treating Provider: Nabil Bahena,  Type of Session: Individual  Present in Session: Nabil Ragland    Current Status:  Depression/Mood:  Sadness  Increased crying  Decreased interest  Decreased appetite  Sleep irregularities  Low energy  Low self-esteem/guilt  Irritability  Decreased concentration/indecision  Rumination  Dysphoria     Anxiety/Panic:  Worry/Anxiety  GI Distress  Social Fear  Dizziness  Physiological reactivity     Thought:   Distractible  Racing " Thoughts     Sensorium:  Paranoia     Behavior/Health:  Increase in goal directed activities  Occupational problems     Chemical Use:  None     Sexual Problems: (Note: client identifies as asexual)  Low Desire  Sexual Aversion     Gender concerns:  Gender dysphoria  Body dysphoria  Social dysphoria     Suicide Risk Assessment:  Assessed Level of Immediate Risk:  None  Ideation:  NO  Plan:  NO   Means:  NO  Intent:  NO     Homicide Risk Assessment:  Assessed Level of Immediate Risk:  None  Ideation:  NO  Plan:  NO  Means:  NO  Intent:  NO     Impact of Symptoms on Function:  Physical/Health  Social/Family  Work     DSM-5 Diagnoses:   302.6 (F64.0) Gender Dysphoria in adolescent and adult  300.02 (F41.1) Generalized Anxiety Disorder  296.32 (F33.1) Major Depressive Disorder, Recurrent, Moderate     Screening Questionnaires:  Completed the following screening questionnaires:  PHQ-9: Score = 11, indicating moderate depression.  LOVE-7: Score = 12, indicating moderate anxiety     Problem(s):  1. Persistent gender dysphoria   2. Symptoms of anxiety and depression  3. Relationship difficulties  4. Decreased desire in doing things     Short-Long Term Goals  (1) emotionally work through transition-related issues (prepare for top surgery, letters, etc.)  (2) improve communication in relationship (open discussion of boundaries, etc.)  (3) learn new skills to cope with feelings of gender dysphoria     Interventions  Emotion-Focused Techniques  Cognitive-Behavior Therapy     Expected Outcomes and Prognosis  Return to normal functioning     Frequency of Sessions  Weekly sessions for now, biweekly in June     Current Psychoactive Medications:  None  Discharge & Aftercare Goals: TBD  Care Coordination: No     Consent to Treatment:   Patient participated in this treatment planning process and indicated verbal agreement with the above treatment plan.  If patient doesn't sign, indicate why: Telehealth visit due to COVID19 pandemic

## 2022-02-21 NOTE — LETTER
2/21/2022       RE: Ramila Woods  [address]  [address]  [facility] [address]     Dear Colleague,    Thank you for referring your patient, Ramila Woods, to the [facility] [place] at [facility]. Please see a copy of my visit note below.    PHQ 2/13/2020 1/20/2021 2/21/2022   PHQ-9 Total Score 21 11 22   Q9: Thoughts of better off dead/self-harm past 2 weeks Not at all Not at all More than half the days     LOVE-7 SCORE 1/20/2021 8/11/2021 2/21/2022   Total Score 4 11 15     SUBJECTIVE:  Ramila Woods is a 27 year old nonbinary adult, who uses they/them pronouns,  who presents today for her menses suppression w Mirena IUD and first pap smear  Took 10 mg of valium at the beginning of the visit, has been very anxious anticipating this procedure.  HPI: no specific concerns today   -worried about first pelvic/spec exam, has never had anything in their vagina  - meds are not helping as much as before, looking for access to psychiatry. Sees therapist regularly  -High Phq-9- no plans for self harm, knows resources, call help line last week, support from partner Karla  -heavy painful menses  -needs first pap  -wants to pursue small amount of  HRT, looking for limited change like lowered voice, recent top surgery.  -counseled on R/B/A of Mirena IUD  MENSTRUAL HISTORY  ==================  OB History   No obstetric history on file.      Patient's last menstrual period was 02/09/2022.   Periods are regular q 28-30 days,  Dysmenorrhea severe, occurring throughout menses. Cyclic symptoms include  NONE and cramping/pelvic pain. Additional symptoms include NONE    SEXUAL HISTORY  ==============  Current contraception: none needed  Number of partners in last year: 1      CURRENT LIFE STYLE:  ===================  Social History     Socioeconomic History     Marital status: Single     Spouse name: Not on file     Number of children: Not on file     Years  of education: Not on file     Highest education level: Not on file   Occupational History     Not on file   Tobacco Use     Smoking status: Passive Smoke Exposure - Never Smoker     Smokeless tobacco: Never Used     Tobacco comment: 2nd hand smoke exposure years ago   Substance and Sexual Activity     Alcohol use: Never     Drug use: Never     Sexual activity: Not on file   Other Topics Concern     Not on file   Social History Narrative     Not on file     Social Determinants of Health     Financial Resource Strain: Not on file   Food Insecurity: Not on file   Transportation Needs: Not on file   Physical Activity: Not on file   Stress: Not on file   Social Connections: Not on file   Intimate Partner Violence: Not At Risk     Fear of Current or Ex-Partner: No     Emotionally Abused: No     Physically Abused: No     Sexually Abused: No   Housing Stability: Not on file     SCREENING  ===========  Health Maintenance   Topic Date Due     PREVENTIVE CARE VISIT  Never done     ASTHMA ACTION PLAN  Never done     ASTHMA CONTROL TEST  Never done     ADVANCE CARE PLANNING  Never done     DEPRESSION ACTION PLAN  Never done     Pneumococcal Vaccine: Pediatrics (0 to 5 Years) and At-Risk Patients (6 to 64 Years) (1 of 2 - PPSV23) Never done     HIV SCREENING  Never done     HEPATITIS C SCREENING  Never done     PAP  Never done     MENTAL HEALTH TX PLAN  03/14/2022     PHQ-9  08/21/2022     LOVE ASSESSMENT  02/21/2023     DTAP/TDAP/TD IMMUNIZATION (4 - Td or Tdap) 12/20/2027     INFLUENZA VACCINE  Completed     HEPATITIS B IMMUNIZATION  Completed     COVID-19 Vaccine  Completed     IPV IMMUNIZATION  Aged Out     MENINGITIS IMMUNIZATION  Aged Out     No results found for: PAP       No results found for: CHOL  No results found for: HDL  No results found for: LDL  No results found for: TSH    IMMUNIZATIONS  =============  Immunization History   Administered Date(s) Administered     COVID-19,PF,Moderna 12/15/2021     DTP-Hib  "1995, 1995, 1995, 10/05/1995     DTaP, Unspecified 12/20/2017     HPV9 08/14/2019, 09/22/2020     Hep B, Peds or Adolescent 1994, 1994, 08/02/1996     Influenza Vaccine IM > 6 months Valent IIV4 (Alfuria,Fluzone) 01/23/2019, 09/22/2020     MMR 10/05/1995     OPV, unspecified 1995, 1994, 1994     Tdap (Adult) Unspecified Formulation 07/11/2014     HISTORIES  =========     Allergies   Allergen Reactions     Dust Mites      Seasonal Allergies      Family History   Problem Relation Age of Onset     Lupus Mother      Depression Mother      Lupus Maternal Grandmother      Depression Father      No Known Problems Sister      No Known Problems Brother      No Known Problems Brother      No Known Problems Sister      No past medical history on file.  Past Surgical History:   Procedure Laterality Date     TRANSGENDER MASTECTOMY Bilateral 9/17/2021    Procedure: MASTECTOMY, BILATERAL, SIMPLE, nipple grafts. OnQ.;  Surgeon: Radha Tan MD;  [place] [facility] [place]     REVIEW OF SYSTEMS  ==================   ROS: 10 point ROS neg other than the symptoms noted above in the HPI.    EXAM  =========  /64   Pulse 60   Ht 1.676 m (5' 6\")   Wt 68.3 kg (150 lb 8 oz)   LMP 02/09/2022   BMI 24.29 kg/m    BMI: Body mass index is 24.29 kg/m .  GENERAL APPEARANCE: healthy, alert and no distress  PSYCH: mentation appears normal, affect normal/bright, anxious and worried about how manage exam today, has never had pelvic exam  MENTAL STATUS EXAM:  Appearance/Behavior: Casually groomed and Dressed appropriately for weather  Speech: Normal  Mood/Affect: normal affect and some anxiety, very self aware and able to articulate concerns clearly and appropriately  Insight: Adequate    PELVIC EXAM:  External Genitalia: WNL  Bartholin's/Urethral Meatus/[place]'s (BUS):  WNL/Negative and unable to evaluate completely  Vagina:  Unable to exam d/t complete hymnal ring intact, did not allow one " finger to penetrate past hymnal ring.  Anus/Perineum:  Intact        ASSESSMENT  ===========  Gender dysphoria  Need for pap smear  Desires menses suppression w Mirena IUD  Depression/anxiety       PLAN  ===========  Discussed findings and inablility to move forward with speculum placement for procedure  Discussed option of IUD and pap under sedation w GYN MD team-will move forward to schedule  Discussed continuing with care w Dr. Mackey for testosterone treatment, gave number to schedule, likely phone visit  Discussed Phq9 and Gad7 and concerns, pt denies plan for self harm, seeing therapist. Referral placed for [facility] psych for med management  (F43.12) Chronic post-traumatic stress disorder (PTSD)  (primary encounter diagnosis)      (F33.2) Severe episode of recurrent major depressive disorder, without psychotic features (H)    (F41.1) LOVE (generalized anxiety disorder)      (F64.0) Gender dysphoria in adolescent and adult    Unsuccessful attempt to insert IUD      PATIENT EDUCATION  =================  1.  Additional teaching done at this visit included: birth control and mental health  Piedad Baum, DNP, APRN, CNM, FACNM

## 2022-02-22 ASSESSMENT — ANXIETY QUESTIONNAIRES: GAD7 TOTAL SCORE: 15

## 2022-02-24 ENCOUNTER — TELEPHONE (OUTPATIENT)
Dept: OBGYN | Facility: CLINIC | Age: 27
End: 2022-02-24
Payer: COMMERCIAL

## 2022-02-24 NOTE — TELEPHONE ENCOUNTER
----- Message from Nkechi Watson MD sent at 2/24/2022  3:58 PM CST -----  Regarding: Schedule patient for phone visit  Can you schedule patient for phone visit with any MD provider? Consult for exam under anesthesia.

## 2022-02-27 PROBLEM — F32.2 DEPRESSION, MAJOR, SINGLE EPISODE, SEVERE (H): Status: ACTIVE | Noted: 2021-11-01

## 2022-02-27 PROBLEM — N94.89 SUPPRESSION OF MENSES: Status: ACTIVE | Noted: 2022-02-27

## 2022-03-15 ENCOUNTER — TELEPHONE (OUTPATIENT)
Dept: FAMILY MEDICINE | Facility: CLINIC | Age: 27
End: 2022-03-15
Payer: COMMERCIAL

## 2022-03-18 ENCOUNTER — VIRTUAL VISIT (OUTPATIENT)
Dept: OBGYN | Facility: CLINIC | Age: 27
End: 2022-03-18
Attending: OBSTETRICS & GYNECOLOGY
Payer: COMMERCIAL

## 2022-03-18 DIAGNOSIS — N94.89 MENSTRUAL SUPPRESSION: Primary | ICD-10-CM

## 2022-03-18 DIAGNOSIS — Z12.4 SCREENING FOR MALIGNANT NEOPLASM OF CERVIX: ICD-10-CM

## 2022-03-18 PROCEDURE — 99212 OFFICE O/P EST SF 10 MIN: CPT | Mod: 95 | Performed by: OBSTETRICS & GYNECOLOGY

## 2022-03-18 RX ORDER — KETOROLAC TROMETHAMINE 30 MG/ML
30 INJECTION, SOLUTION INTRAMUSCULAR; INTRAVENOUS ONCE
Status: CANCELLED | OUTPATIENT
Start: 2022-03-18 | End: 2022-03-18

## 2022-03-18 RX ORDER — ACETAMINOPHEN 325 MG/1
975 TABLET ORAL ONCE
Status: CANCELLED | OUTPATIENT
Start: 2022-03-18 | End: 2022-03-18

## 2022-03-18 NOTE — PROGRESS NOTES
"The patient has been notified of the following:      \"We have found that certain health care needs can be provided without the need for a face to face visit.  This service lets us provide the care you need with a phone conversation.       I will have full access to your [place] medical record during this entire phone call.   I will be taking notes for your medical record.      Since this is like an office visit, we will bill your insurance company for this service.       There are potential benefits and risks of telephone visits (e.g. limits to patient confidentiality) that differ from in-person visits.?  Confidentiality still applies for telephone services, and nobody will record the visit.  It is important to be in a quiet, private space that is free of distractions (including cell phone or other devices) during the visit.??      If during the course of the call I believe a telephone visit is not appropriate, you will not be charged for this service\"     Consent has been obtained for this service by care team member: Yes     Obie presents for phone visit to discuss plan for exam under anesthesia, pap smear and IUD placement.  They are a nonbinary adult who uses they/them pronouns.  They saw Piedad DE LA TORRE CNM for attempted clinic placement of Mirena IUD for menstrual suppression.  This was not successful and discussion was had to proceed with exam under anesthesia.  Obie is comfortable with this plan.    No past medical history on file.  Gender dysphoria  Depression/anxiety    Past Surgical History:   Procedure Laterality Date     TRANSGENDER MASTECTOMY Bilateral 9/17/2021    Procedure: MASTECTOMY, BILATERAL, SIMPLE, nipple grafts. OnQ.;  Surgeon: Radha Tan MD;  Location[facility] [place]     Family History   Problem Relation Age of Onset     Lupus Mother      Depression Mother      Lupus Maternal Grandmother      Depression Father      No Known Problems Sister      No Known Problems Brother      No Known " Problems Brother      No Known Problems Sister      Physical exam:  No vitals or exam for this telephone visit.  Patient sounds in no distress, asks pertinent questions.    A/P:  28 yo P0 due for pap screening and desires Mirena IUD for menstrual suppression.  Unable to tolerate exam in office.    - Reviewed the procedure and MAC anesthesia.  - Risks and benefits of IUD placement discuss  - Orders to schedule placed.  - Patient is low risk for this procedure, will update H&P by Piedad Baum CNM on day of procedure.  OK to schedule with any provider.    Nubia Spicer MD     Telephone call time: 6 min    On the day of this encounter at least 15 minutes of time was spent in consultation with face-to-face discussion, review of historical information, examination, documentation and post visit activities including communication with other providers and coordination of patient care.  Nubia Spicer MD

## 2022-03-18 NOTE — LETTER
"3/18/2022       RE: Ramila Woods  [address]  [address]  [place] [address]     Dear Colleague,    Thank you for referring your patient, Ramila Woods, to the [facility] [place] at [facility]. Please see a copy of my visit note below.    The patient has been notified of the following:      \"We have found that certain health care needs can be provided without the need for a face to face visit.  This service lets us provide the care you need with a phone conversation.       I will have full access to your [place] medical record during this entire phone call.   I will be taking notes for your medical record.      Since this is like an office visit, we will bill your insurance company for this service.       There are potential benefits and risks of telephone visits (e.g. limits to patient confidentiality) that differ from in-person visits.?  Confidentiality still applies for telephone services, and nobody will record the visit.  It is important to be in a quiet, private space that is free of distractions (including cell phone or other devices) during the visit.??      If during the course of the call I believe a telephone visit is not appropriate, you will not be charged for this service\"     Consent has been obtained for this service by care team member: Yes     Obie presents for phone visit to discuss plan for exam under anesthesia, pap smear and IUD placement.  They are a nonbinary adult who uses they/them pronouns.  They saw Piedad DE LA TORRE CNM for attempted clinic placement of Mirena IUD for menstrual suppression.  This was not successful and discussion was had to proceed with exam under anesthesia.  Obie is comfortable with this plan.    No past medical history on file.  Gender dysphoria  Depression/anxiety    Past Surgical History:   Procedure Laterality Date     TRANSGENDER MASTECTOMY Bilateral 9/17/2021    Procedure: MASTECTOMY, " BILATERAL, SIMPLE, nipple grafts. OnQ.;  Surgeon: Radha Tan MD;  Location: [facility] OR     Family History   Problem Relation Age of Onset     Lupus Mother      Depression Mother      Lupus Maternal Grandmother      Depression Father      No Known Problems Sister      No Known Problems Brother      No Known Problems Brother      No Known Problems Sister      Physical exam:  No vitals or exam for this telephone visit.  Patient sounds in no distress, asks pertinent questions.    A/P:  26 yo P0 due for pap screening and desires Mirena IUD for menstrual suppression.  Unable to tolerate exam in office.    - Reviewed the procedure and MAC anesthesia.  - Risks and benefits of IUD placement discuss  - Orders to schedule placed.  - Patient is low risk for this procedure, will update H&P by Piedad Baum CNM on day of procedure.  OK to schedule with any provider.    Nubia Spicer MD     Telephone call time: 6 min    On the day of this encounter at least 15 minutes of time was spent in consultation with face-to-face discussion, review of historical information, examination, documentation and post visit activities including communication with other providers and coordination of patient care.  Nubia Spicer MD

## 2022-03-21 ENCOUNTER — TRANSCRIBE ORDERS (OUTPATIENT)
Dept: OBGYN | Facility: CLINIC | Age: 27
End: 2022-03-21
Payer: COMMERCIAL

## 2022-03-21 ENCOUNTER — VIRTUAL VISIT (OUTPATIENT)
Dept: PSYCHOLOGY | Facility: CLINIC | Age: 27
End: 2022-03-21
Payer: COMMERCIAL

## 2022-03-21 ENCOUNTER — TELEPHONE (OUTPATIENT)
Dept: OBGYN | Facility: CLINIC | Age: 27
End: 2022-03-21
Payer: COMMERCIAL

## 2022-03-21 DIAGNOSIS — Z01.812 ENCOUNTER FOR PRE-OPERATIVE LABORATORY TESTING: Primary | ICD-10-CM

## 2022-03-21 DIAGNOSIS — Z11.59 ENCOUNTER FOR SCREENING FOR OTHER VIRAL DISEASES: Primary | ICD-10-CM

## 2022-03-21 DIAGNOSIS — F33.1 MAJOR DEPRESSIVE DISORDER, RECURRENT EPISODE, MODERATE (H): Primary | ICD-10-CM

## 2022-03-21 DIAGNOSIS — F41.1 GENERALIZED ANXIETY DISORDER: ICD-10-CM

## 2022-03-21 DIAGNOSIS — F64.0 GENDER DYSPHORIA IN ADOLESCENT AND ADULT: ICD-10-CM

## 2022-03-21 PROBLEM — Z12.4 SCREENING FOR MALIGNANT NEOPLASM OF CERVIX: Status: ACTIVE | Noted: 2022-03-21

## 2022-03-21 PROCEDURE — 99207 PR NO BILLABLE SERVICE THIS VISIT: CPT | Performed by: STUDENT IN AN ORGANIZED HEALTH CARE EDUCATION/TRAINING PROGRAM

## 2022-03-21 PROCEDURE — 90834 PSYTX W PT 45 MINUTES: CPT | Mod: 95 | Performed by: STUDENT IN AN ORGANIZED HEALTH CARE EDUCATION/TRAINING PROGRAM

## 2022-03-21 NOTE — TELEPHONE ENCOUNTER
Spoke with patient, scheduled surgery. Patient is aware of date, time, location, prep instructions, need for H&P within 30 days and covid test within 96 hours of surgery.     Type of surgery: EXAM UNDER ANESTHESIA, MIRENA IUD INSERTION  Location of surgery: [facility]  Date and time of surgery: 4/12/22 at 8am  Surgeon: Graciela Colón MD  Pre-Op Appt Date: already done on 3/18/22, can be updated on DOS if needed  Post-Op Appt Date: 4-6 weeks   Packet sent out: Yes  Pre-cert/Authorization completed:  Yes  Date: 3/21/22    Yahaira Burgess  Clinical Services Assistant

## 2022-03-21 NOTE — LETTER
March 21, 2022    Ramila Woods   [address] [place] [address]  [facility] [address]       Dear Ramila Woods,    Thank you for choosing [facility] for your surgical procedure.  You will enter the hospital as an outpatient, or same-day surgery patient, which means that you will enter the hospital the day of your surgery and leave that same day.    You procedure is scheduled on:    Date: Tuesday April 12th, 2022    Time: 8:00am    Please arrive at: 6:00am    Procedure: EXAM UNDER ANESTHESIA, PELVIS, Mirena IUD insertion    MD: Graciela Colón MD    Go to:  The [facility] ([facility]) entrance located at [address] [place] [id number]. The main entrance staff will direct you to the appropriate floor/location.     parking is available for a flat rate of $7, regardless of your length of visit. You may also self-park in a nearby parking ramp, regular ramp rates may apply.    Please keep your ticket with you as your ticket will be validated to give you a reduced rate for the parking garage/ramp.     NOTE: The times noted above may change.  A nurse from the hospital pre-admission department will call to confirm your procedure.  He or she will answer any questions you have about the procedure and will provide you with instructions for taking medications the day of the procedure.  If you have not received a call, or if you have more questions please call us one working day before your procedure.  Call pre-admission department at [phone].  If you need to cancel or reschedule your surgery please call [phone].    FOLLOW-UP/POST OPERATIVE VISIT    Please call now and schedule a follow-up IUD check appointment with your provider.     Your follow-up/postoperative visit is due approximately 4-6 weeks after your procedure.    GETTING READY FOR SURGERY    You must have a preoperative COVID-19 PCR/NAAT test done within 4 days (96 hours) of your procedure. You  will receive a call to schedule this test, otherwise you may call [phone].    *If you get a fever, cold, or rash please call the [facility] Triage Nurses at [phone] - we may need to postpone your procedure.    TEN DAYS BEFORE SURGERY    [place] with the hospital 10 days before your procedure date.     Have your insurance card ready.     To register online, go to [email or web address].     You may also call the hospital Pre Registration Department at [phone].    THE DAY BEFORE SURGERY    For a same-day procedure, you must arrange for an adult to take you home from the hospital. You cannot drive, take a cab, or ride a bus home by yourself.  An adult must stay with you for the first 24 hours after your procedure.    If you smoke - quit or at least cut down.    Stop drinking alcohol (liquor, beer, and wine) at least 24 hours before your procedure.    Shower or bathe the night before and the morning of your procedure.  Use an antiseptic surgical soap such as Hibiclens, Scrub Care or Exidine. You can find it at your local pharmacy.  If your doctor does not give you a special soap, buy Hibiclens or Iva-Star at the drug store.  You can also ask your pharmacist to recommend an antiseptic alternative soap.    Do not put on lotion, powder, perfume, deodorant, or make-up after bathing.    Remove all nail polish.    THE DAY OF SURGERY    Have nothing to eat or drink starting eight hours before your procedure.    Nothing by mouth within two hours of the procedure, including gum, candy and mints.     Take prescription medicines as instructed by the hospital preadmissions nursing staff.    Alternatively you should follow any directives you receive from the MD doing your procedure.     Do not take insulin or oral diabetes medicine on the day of the surgery.     Take off jewelry, including rings and body piercings.    Leave valuables at home.    Bring these items to the hospital:  1. Insurance  cards.  2. Forms your doctor asked you to bring.  3. Health care directive or living will, if you have one.    If you are under 18, a parent or legal guardian must come with you to the hospital.      Sincerely,      [facility]      If you are hard of hearing, please let us know. We provide many free services including sign language and oral interpreters, TTYs, telephone amplifiers, note takers, and written materials.

## 2022-03-21 NOTE — LETTER
March 21, 2022    Ramila Woods   [address] [place] [address]    [facility] [address]       Dear Ramila Woods,    Thank you for choosing [facility] for your surgical procedure.  You will enter the hospital as an outpatient, or same-day surgery patient, which means that you will enter the hospital the day of your surgery and leave that same day.    You procedure is scheduled on:    Date: Tuesday April 12th, 2022    Time: 8:00am    Please arrive at: 6:00am    Procedure: EXAM UNDER ANESTHESIA, PELVIS, Mirena IUD insertion    MD: Graciela Colón MD    Go to:  The main hospital entrance (EAST building entrance) is located on [address] ([address] [facility] [address])  There is signage in the lobby directing you to check in on 3rd floor of the EAST building.       parking is available (no charge to park your car, regular parking rates do apply). You may also self park your car in the Green garage - the entrance is located on [address].     Please keep your ticket with you as your ticket will be validated to give you a reduced rate for the parking garage.     NOTE: The times noted above may change.  A nurse from the hospital pre-admission department will call to confirm your procedure.  He or she will answer any questions you have about the procedure and will provide you with instructions for taking medications the day of the procedure.  If you have not received a call, or if you have more questions please call us one working day before your procedure.  Call pre-admission department at [phone].  If you need to cancel or reschedule your surgery please call [phone].    FOLLOW-UP/POST OPERATIVE VISIT    Please call now and schedule a follow-up IUD check appointment with your provider.     Your follow-up/postoperative visit is due approximately 4-6 weeks after your procedure.    GETTING READY FOR SURGERY    You must have a preoperative COVID-19  PCR/NAAT test done within 4 days (96 hours) of your procedure. You will receive a call to schedule this test, otherwise you may call [phone].    *If you get a fever, cold, or rash please call the [facility] Triage Nurses at [phone] - we may need to postpone your procedure.    TEN DAYS BEFORE SURGERY    [place] with the hospital 10 days before your procedure date.     Have your insurance card ready.     To register online, go to [email or web address].     You may also call the hospital Pre Registration Department at [phone].    THE DAY BEFORE SURGERY    For a same-day procedure, you must arrange for an adult to take you home from the hospital. You cannot drive, take a cab, or ride a bus home by yourself.  An adult must stay with you for the first 24 hours after your procedure.    If you smoke - quit or at least cut down.    Stop drinking alcohol (liquor, beer, and wine) at least 24 hours before your procedure.    Shower or bathe the night before and the morning of your procedure.  Use an antiseptic surgical soap such as Hibiclens, Scrub Care or Exidine. You can find it at your local pharmacy.  If your doctor does not give you a special soap, buy Hibiclens or Iva-Star at the drug store.  You can also ask your pharmacist to recommend an antiseptic alternative soap.    Do not put on lotion, powder, perfume, deodorant, or make-up after bathing.    Remove all nail polish.    THE DAY OF SURGERY    Have nothing to eat or drink starting eight hours before your procedure.    Nothing by mouth within two hours of the procedure, including gum, candy and mints.     Take prescription medicines as instructed by the hospital preadmissions nursing staff.    Alternatively you should follow any directives you receive from the MD doing your procedure.     Do not take insulin or oral diabetes medicine on the day of the surgery.     Take off jewelry, including rings and body piercings.    Leave  valuables at home.    Bring these items to the hospital:  1. Insurance cards.  2. Forms your doctor asked you to bring.  3. Health care directive or living will, if you have one.    If you are under 18, a parent or legal guardian must come with you to the hospital.      Sincerely,      [facility]      If you are hard of hearing, please let us know. We provide many free services including sign language and oral interpreters, TTYs, telephone amplifiers, note takers, and written materials.

## 2022-03-21 NOTE — PROGRESS NOTES
[facility] -  Case Progress Note    Date of Service: 3/21/22   Name: Ramila Barnes)  Gender Identity: Non-binary/Agender  Pronouns: They/them  : 1995  Medical Record Number: [id number]  Treating Provider: Nabil Bahena, PhD  Type of Session: Individual  Present in Session: Nabil Ragland  Number of Minutes: 48  Video start time: 2:02pm  Video end time: 2:50pm    Telemedicine Visit: The patient's condition can be safely assessed and treated via synchronous audio and visual telemedicine encounter.       Reason for Telemedicine Visit: Services only offered telehealth     Originating Site (Patient Location): Patient's home     Distant Site (Provider Location): Provider Remote Setting     Consent:  The patient/guardian has verbally consented to: the potential risks and benefits of telemedicine (video visit) versus in person care; bill my insurance or make self-payment for services provided; and responsibility for payment of non-covered services.      Mode of Communication:  Video Conference via Doxy due to tech issues.     As the provider I attest to compliance with applicable laws and regulations related to telemedicine.    Health Maintenance   Topic Date Due     MENTAL HEALTH [place] PLAN  2022     Current Symptoms/Status:  Client has an established history of experiencing gender dysphoria, psychological distress stemming from incongruence with gender identity and sex assigned at birth exacerbated by primary and secondary sex characteristics. Client reported experiencing the above-mentioned symptoms as well as several depressive symptoms (sadness, fatigue, low energy and interest in pleasurable activities, low motivation, sleep disturbance: difficulty stay asleep and waking up several times in the middle of the night). Client reports ongoing concerns related to gender dysphoria and navigating interpersonal relationships. Client reported a history of family trauma related to substance use and  addiction, difficulties being accepted as agender/non-binary by family, and navigating societal expectations of relationships.     Client also has a history of non-suicidal self-injurious behavior (biting self without breaking skin, pulling hair, pinching and squeezing skin). Their most recent instance of this was in March of 2022 and overall, they do not engage in this behavior regularly.     Progress Toward Treatment Goals:   Client reported making progress in their interpersonal relationships but struggling with symptoms of depression, anxiety, and gender dysphoria. Client expressed commitment to working toward their treatment goals: (1) emotionally work through transition-related issues, (2) improve communication in relationship (open discussion of boundaries, etc.), and (3) learn new skills to cope with feelings of gender dysphoria. Client had top surgery on 9/17/2021.    In this session, we focused on updating treatment plan and goals.    Intervention: Modality and Description:   Client reported experiencing ongoing depression, anxiety, and gender dysphoria. Client reported also noticing worsening difficulties with executive functioning (related to ADHD), which they attribute to anxiety and ongoing stressors. This session focused on updating their treatment plan and goals, and completing measures (PHQ-9 & LOVE-7). Client reported not experiencing suicidal ideation, an improvement over the past couple of weeks. Overall, we focused on coping skills and strategies to improve tolerance.    Therapist provided support and validation. Therapist used emotion-focused strategies and DBT to help client process recent stressors. We decided to switch from monthly to biweekly sessions until symptoms lessen.     Response to Intervention:  Client was open and receptive to strategies and interventions used in session. Client was open to developing more self-care strategies to promote their own wellness.     Assignment:  -  "None    Interactive Complexity:  Not applicable.    DSM-5 Diagnoses:  296.32 (F33.1) Major Depressive Disorder, Recurrent, Moderate  300.02 (F41.1) Generalized Anxiety Disorder  302.85 (F64.0) Gender Dysphoria in adolescent and adult    Plan/Need for Future Services:  Return for therapy in 2 weeks to treat diagnosed problems.        Nabil Bahena, PhD  Postdoctoral Fellow       _______________________________         TREATMENT PLAN UPDATE  Date of Treatment Plan: 3/21/2022  Name: Ramila Woods \"Obie\"  : 1995  Medical Record Number: [id number]  Treating Provider: Nabil Bahena,  Type of Session: Individual  Present in Session: Nabil Ragland    Current Status:  Depression/Mood:  Sadness  Decreased interest  Decreased appetite  Sleep irregularities  Low energy  Low self-esteem/guilt  Decreased concentration/indecision (ADHD related)  Rumination  Dysphoria     Anxiety/Panic:  Worry/Anxiety  GI Distress  Social Fear  Dizziness  Physiological reactivity     Thought:   Distractible  Racing Thoughts     Sensorium:  Paranoia     Behavior/Health:  Increase in goal directed activities  Occupational problems     Chemical Use:  None     Sexual Problems: (Note: client identifies as asexual)  Low Desire  Sexual Aversion     Gender concerns:  Gender dysphoria  Body dysphoria  Social dysphoria     Suicide Risk Assessment:  Assessed Level of Immediate Risk:  None  Ideation:  NO  Plan:  NO   Means:  NO  Intent:  NO     Homicide Risk Assessment:  Assessed Level of Immediate Risk:  None  Ideation:  NO  Plan:  NO  Means:  NO  Intent:  NO     Impact of Symptoms on Function:  Physical/Health  Social/Family  Work     DSM-5 Diagnoses:   302.6 (F64.0) Gender Dysphoria in adolescent and adult  300.02 (F41.1) Generalized Anxiety Disorder  296.32 (F33.1) Major Depressive Disorder, Recurrent, Moderate     Screening Questionnaires:  Completed the following screening questionnaires on 3/21/2022:  PHQ-9: Score = 9, indicating mild depression " (Client expressed this might be an underestimate based on their symptoms.)   LOVE-7: Score = 8, indicating mild anxiety     Problem(s):  1. Persistent gender dysphoria   2. Symptoms of anxiety and depression  3. Relationship difficulties  4. Decreased desire in doing things     Short-Long Term Goals  (1) emotionally work through transition-related issues   (2) improve communication in relationship (open discussion of boundaries, etc.)  (3) learn new skills to cope with feelings of gender dysphoria     Interventions  Emotion-Focused Techniques  Cognitive-Behavior Therapy     Expected Outcomes and Prognosis  Return to normal functioning     Frequency of Sessions  Biweekly sessions     Current Psychoactive Medications:  None  Discharge & Aftercare Goals: TBD  Care Coordination: No     Consent to Treatment:   Patient participated in this treatment planning process and indicated verbal agreement with the above treatment plan.  If patient doesn't sign, indicate why: Telehealth visit due to COVID19 pandemic

## 2022-04-09 ENCOUNTER — LAB (OUTPATIENT)
Dept: LAB | Facility: CLINIC | Age: 27
End: 2022-04-09
Attending: OBSTETRICS & GYNECOLOGY
Payer: COMMERCIAL

## 2022-04-09 DIAGNOSIS — Z01.812 ENCOUNTER FOR PRE-OPERATIVE LABORATORY TESTING: ICD-10-CM

## 2022-04-09 PROCEDURE — U0005 INFEC AGEN DETEC AMPLI PROBE: HCPCS

## 2022-04-10 LAB — SARS-COV-2 RNA RESP QL NAA+PROBE: NEGATIVE

## 2022-04-11 ENCOUNTER — ANESTHESIA EVENT (OUTPATIENT)
Dept: SURGERY | Facility: AMBULATORY SURGERY CENTER | Age: 27
End: 2022-04-11
Payer: COMMERCIAL

## 2022-04-12 ENCOUNTER — ANESTHESIA (OUTPATIENT)
Dept: SURGERY | Facility: AMBULATORY SURGERY CENTER | Age: 27
End: 2022-04-12
Payer: COMMERCIAL

## 2022-04-12 ENCOUNTER — HOSPITAL ENCOUNTER (OUTPATIENT)
Facility: AMBULATORY SURGERY CENTER | Age: 27
Discharge: HOME | End: 2022-04-12
Attending: OBSTETRICS & GYNECOLOGY
Payer: COMMERCIAL

## 2022-04-12 VITALS
BODY MASS INDEX: 22.5 KG/M2 | RESPIRATION RATE: 16 BRPM | SYSTOLIC BLOOD PRESSURE: 107 MMHG | WEIGHT: 140 LBS | TEMPERATURE: 97.5 F | DIASTOLIC BLOOD PRESSURE: 72 MMHG | HEIGHT: 66 IN | OXYGEN SATURATION: 100 %

## 2022-04-12 DIAGNOSIS — Z12.4 SCREENING FOR MALIGNANT NEOPLASM OF CERVIX: Primary | ICD-10-CM

## 2022-04-12 DIAGNOSIS — N94.89 MENSTRUAL SUPPRESSION: ICD-10-CM

## 2022-04-12 DIAGNOSIS — Z12.4 SCREENING FOR MALIGNANT NEOPLASM OF CERVIX: ICD-10-CM

## 2022-04-12 LAB
HCG UR QL: NEGATIVE
INTERNAL QC OK POCT: NORMAL
POCT KIT EXPIRATION DATE: NORMAL
POCT KIT LOT NUMBER: NORMAL

## 2022-04-12 PROCEDURE — 81025 URINE PREGNANCY TEST: CPT | Performed by: PATHOLOGY

## 2022-04-12 PROCEDURE — 58300 INSERT INTRAUTERINE DEVICE: CPT

## 2022-04-12 PROCEDURE — 58300 INSERT INTRAUTERINE DEVICE: CPT | Mod: GC | Performed by: OBSTETRICS & GYNECOLOGY

## 2022-04-12 PROCEDURE — 57410 PELVIC EXAMINATION: CPT | Mod: GC | Performed by: OBSTETRICS & GYNECOLOGY

## 2022-04-12 PROCEDURE — G0145 SCR C/V CYTO,THINLAYER,RESCR: HCPCS | Performed by: OBSTETRICS & GYNECOLOGY

## 2022-04-12 PROCEDURE — 57410 PELVIC EXAMINATION: CPT

## 2022-04-12 RX ORDER — LABETALOL HYDROCHLORIDE 5 MG/ML
10 INJECTION, SOLUTION INTRAVENOUS
Status: DISCONTINUED | OUTPATIENT
Start: 2022-04-12 | End: 2022-04-12 | Stop reason: HOSPADM

## 2022-04-12 RX ORDER — KETAMINE HYDROCHLORIDE 10 MG/ML
INJECTION INTRAMUSCULAR; INTRAVENOUS PRN
Status: DISCONTINUED | OUTPATIENT
Start: 2022-04-12 | End: 2022-04-12

## 2022-04-12 RX ORDER — LIDOCAINE HYDROCHLORIDE 10 MG/ML
INJECTION, SOLUTION INFILTRATION; PERINEURAL PRN
Status: DISCONTINUED | OUTPATIENT
Start: 2022-04-12 | End: 2022-04-12 | Stop reason: HOSPADM

## 2022-04-12 RX ORDER — SODIUM CHLORIDE, SODIUM LACTATE, POTASSIUM CHLORIDE, CALCIUM CHLORIDE 600; 310; 30; 20 MG/100ML; MG/100ML; MG/100ML; MG/100ML
INJECTION, SOLUTION INTRAVENOUS CONTINUOUS
Status: DISCONTINUED | OUTPATIENT
Start: 2022-04-12 | End: 2022-04-12 | Stop reason: HOSPADM

## 2022-04-12 RX ORDER — PROPOFOL 10 MG/ML
INJECTION, EMULSION INTRAVENOUS CONTINUOUS PRN
Status: DISCONTINUED | OUTPATIENT
Start: 2022-04-12 | End: 2022-04-12

## 2022-04-12 RX ORDER — SODIUM CHLORIDE, SODIUM LACTATE, POTASSIUM CHLORIDE, CALCIUM CHLORIDE 600; 310; 30; 20 MG/100ML; MG/100ML; MG/100ML; MG/100ML
INJECTION, SOLUTION INTRAVENOUS CONTINUOUS
Status: DISCONTINUED | OUTPATIENT
Start: 2022-04-12 | End: 2022-04-13 | Stop reason: HOSPADM

## 2022-04-12 RX ORDER — FENTANYL CITRATE 50 UG/ML
25 INJECTION, SOLUTION INTRAMUSCULAR; INTRAVENOUS
Status: DISCONTINUED | OUTPATIENT
Start: 2022-04-12 | End: 2022-04-13 | Stop reason: HOSPADM

## 2022-04-12 RX ORDER — ONDANSETRON 4 MG/1
4 TABLET, ORALLY DISINTEGRATING ORAL EVERY 30 MIN PRN
Status: DISCONTINUED | OUTPATIENT
Start: 2022-04-12 | End: 2022-04-13 | Stop reason: HOSPADM

## 2022-04-12 RX ORDER — KETOROLAC TROMETHAMINE 30 MG/ML
30 INJECTION, SOLUTION INTRAMUSCULAR; INTRAVENOUS ONCE
Status: COMPLETED | OUTPATIENT
Start: 2022-04-12 | End: 2022-04-12

## 2022-04-12 RX ORDER — PROPOFOL 10 MG/ML
INJECTION, EMULSION INTRAVENOUS PRN
Status: DISCONTINUED | OUTPATIENT
Start: 2022-04-12 | End: 2022-04-12

## 2022-04-12 RX ORDER — ACETAMINOPHEN 325 MG/1
975 TABLET ORAL ONCE
Status: DISCONTINUED | OUTPATIENT
Start: 2022-04-12 | End: 2022-04-12 | Stop reason: HOSPADM

## 2022-04-12 RX ORDER — LIDOCAINE HYDROCHLORIDE 20 MG/ML
INJECTION, SOLUTION INFILTRATION; PERINEURAL PRN
Status: DISCONTINUED | OUTPATIENT
Start: 2022-04-12 | End: 2022-04-12

## 2022-04-12 RX ORDER — ONDANSETRON 2 MG/ML
4 INJECTION INTRAMUSCULAR; INTRAVENOUS EVERY 30 MIN PRN
Status: DISCONTINUED | OUTPATIENT
Start: 2022-04-12 | End: 2022-04-13 | Stop reason: HOSPADM

## 2022-04-12 RX ORDER — MEPERIDINE HYDROCHLORIDE 25 MG/ML
12.5 INJECTION INTRAMUSCULAR; INTRAVENOUS; SUBCUTANEOUS
Status: DISCONTINUED | OUTPATIENT
Start: 2022-04-12 | End: 2022-04-13 | Stop reason: HOSPADM

## 2022-04-12 RX ORDER — LIDOCAINE 40 MG/G
CREAM TOPICAL
Status: DISCONTINUED | OUTPATIENT
Start: 2022-04-12 | End: 2022-04-12 | Stop reason: HOSPADM

## 2022-04-12 RX ORDER — FENTANYL CITRATE 50 UG/ML
25 INJECTION, SOLUTION INTRAMUSCULAR; INTRAVENOUS EVERY 5 MIN PRN
Status: DISCONTINUED | OUTPATIENT
Start: 2022-04-12 | End: 2022-04-12 | Stop reason: HOSPADM

## 2022-04-12 RX ORDER — ACETAMINOPHEN 325 MG/1
975 TABLET ORAL ONCE
Status: COMPLETED | OUTPATIENT
Start: 2022-04-12 | End: 2022-04-12

## 2022-04-12 RX ORDER — GABAPENTIN 300 MG/1
300 CAPSULE ORAL
Status: COMPLETED | OUTPATIENT
Start: 2022-04-12 | End: 2022-04-12

## 2022-04-12 RX ADMIN — ACETAMINOPHEN 975 MG: 325 TABLET ORAL at 07:19

## 2022-04-12 RX ADMIN — KETAMINE HYDROCHLORIDE 10 MG: 10 INJECTION INTRAMUSCULAR; INTRAVENOUS at 08:24

## 2022-04-12 RX ADMIN — PROPOFOL 10 MG: 10 INJECTION, EMULSION INTRAVENOUS at 08:17

## 2022-04-12 RX ADMIN — PROPOFOL 125 MCG/KG/MIN: 10 INJECTION, EMULSION INTRAVENOUS at 08:14

## 2022-04-12 RX ADMIN — KETOROLAC TROMETHAMINE 30 MG: 30 INJECTION, SOLUTION INTRAMUSCULAR; INTRAVENOUS at 07:22

## 2022-04-12 RX ADMIN — GABAPENTIN 300 MG: 300 CAPSULE ORAL at 07:20

## 2022-04-12 RX ADMIN — PROPOFOL 30 MG: 10 INJECTION, EMULSION INTRAVENOUS at 08:15

## 2022-04-12 RX ADMIN — SODIUM CHLORIDE, SODIUM LACTATE, POTASSIUM CHLORIDE, CALCIUM CHLORIDE: 600; 310; 30; 20 INJECTION, SOLUTION INTRAVENOUS at 07:22

## 2022-04-12 RX ADMIN — LIDOCAINE HYDROCHLORIDE 60 MG: 20 INJECTION, SOLUTION INFILTRATION; PERINEURAL at 08:14

## 2022-04-12 NOTE — ANESTHESIA POSTPROCEDURE EVALUATION
Patient: Ramila Woods    Procedure: Procedure(s):  EXAM UNDER ANESTHESIA, PELVIS: PAP SMEAR, MIRENA INSERTION, INTRAUTERINE DEVICE       Anesthesia Type:  MAC    Note:  Disposition: Outpatient   Postop Pain Control: Uneventful            Sign Out: Well controlled pain   PONV: No   Neuro/Psych: Uneventful            Sign Out: Acceptable/Baseline neuro status   Airway/Respiratory: Uneventful            Sign Out: Acceptable/Baseline resp. status   CV/Hemodynamics: Uneventful            Sign Out: Acceptable CV status   Other NRE: NONE   DID A NON-ROUTINE EVENT OCCUR? No           Last vitals:  Vitals Value Taken Time   /72 04/12/22 0915   Temp 36.4  C (97.5  F) 04/12/22 0915   Pulse     Resp 16 04/12/22 0915   SpO2 100 % 04/12/22 0845       Electronically Signed By: Pato Guevara MD  April 12, 2022  2:06 PM

## 2022-04-12 NOTE — ANESTHESIA CARE TRANSFER NOTE
Patient: Ramila Woods    Procedure: Procedure(s):  EXAM UNDER ANESTHESIA, PELVIS: PAP SMEAR, MIRENA INSERTION, INTRAUTERINE DEVICE       Diagnosis: Screening for malignant neoplasm of cervix [Z12.4]  Menstrual suppression [N94.89]  Diagnosis Additional Information: No value filed.    Anesthesia Type:   MAC     Note:    Oropharynx: oropharynx clear of all foreign objects and spontaneously breathing  Level of Consciousness: drowsy  Oxygen Supplementation: room air    Independent Airway: airway patency satisfactory and stable  Dentition: dentition unchanged  Vital Signs Stable: post-procedure vital signs reviewed and stable  Report to RN Given: handoff report given  Patient transferred to: Phase II    Handoff Report: Identifed the Patient, Identified the Reponsible Provider, Reviewed the pertinent medical history, Discussed the surgical course, Reviewed Intra-OP anesthesia mangement and issues during anesthesia, Set expectations for post-procedure period and Allowed opportunity for questions and acknowledgement of understanding      Vitals:  Vitals Value Taken Time   /65 04/12/22 0845   Temp 36.1  C (97  F) 04/12/22 0845   Pulse     Resp 14 04/12/22 0845   SpO2 100 % 04/12/22 0845       Electronically Signed By: WIL Flores CRNA  April 12, 2022  8:46 AM

## 2022-04-12 NOTE — OP NOTE
Operative Note    Patient: Ramila Woods  : 1995  MRN: [id number]    Date of Service: 2022    Pre-operative diagnosis:  - Desires menstrual suppression  - MDD/LOVE  - Gender dysphoria    Post-operative diagnosis:  - Same, status post procedure    Procedure:   - EUA, Mirena IUD insertion  - PAP smear    Surgeon: Graciela Colón MD  Assistants: Elisabeth Vazquez MD PGY-1    Anesthesia: Local with MAC    EBL: 5 mL  Urine: Not measured  Fluids: 300 crystalloid    Specimens: None  Complications: None    Findings: Normal external female genitalia. Cervix nulliparous. Bimanual revealed a small, mobile uterus with no masses.    Indications: Ramila Woods is a 27 year old  G0 who desires menstural suppression. IUD placement was attempted in clinic on  but was unsuccessful due to patient discomfort. IUD placement under MAC anesthesia was recommended. Discussed risks, benefits, and alternatives to the procedure including risk of infection, bleeding, damage to local organs, blood clots. The patient's questions were answered, understanding confirmed, and the patient signed written informed consent.    Technique: The patient was brought to the OR where MAC anesthesia was administered and found to be adequate. They were positioned in dorsal lithotomy and prepped and draped in sterile fashion. A surgical timeout was performed. Exam under anesthesia revealed the findings noted above.    A speculum was inserted in the vagina, and the cervix visualized. A pap smear was collected. Then, a single-toothed tenaculum was applied to the anterior lip of the cervix for stabilization. 20 ml 1% lidocaine was injected at 4 o'clock and 8 o'clock into the cervicovaginal junction. The uterus sounded to a depth of 7 cm.     IUD placed without difficulty, strings trimmed to 2.5 cm.   LOT # [id number]   EXP date: 2024   Expected removal date: 2027     The tenaculum was removed and a sponge stick was inserted to ensure excellent  hemostasis. The sponge stick and speculum were removed.    Instrument, needle, and sponge counts were correct times 2. Dr. Colón was present for the critical portions of the procedure and immediately available at all other times. The patient was transferred to the PACU in stable condition.    Elisabeth Vazquez MD  Ob/Gyn Resident, PGY-1  04/12/2022 8:57 AM     I was scrubbed and present for the entire procedure.  I have reviewed and edited the above note.     Graciela Colón MD, FACOG

## 2022-04-12 NOTE — ANESTHESIA PREPROCEDURE EVALUATION
Anesthesia Pre-Procedure Evaluation    Patient: Ramila Woods   MRN: [id number] : 1995        Procedure : Procedure(s):  EXAM UNDER ANESTHESIA, PELVIS  MIRENA INSERTION, INTRAUTERINE DEVICE          History reviewed. No pertinent past medical history.   Past Surgical History:   Procedure Laterality Date     TRANSGENDER MASTECTOMY Bilateral 2021    Procedure: MASTECTOMY, BILATERAL, SIMPLE, nipple grafts. OnQ.;  Surgeon: Radha Tan MD;  Location: [facility] OR      Allergies   Allergen Reactions     Dust Mites      Seasonal Allergies       Social History     Tobacco Use     Smoking status: Passive Smoke Exposure - Never Smoker     Smokeless tobacco: Never Used     Tobacco comment: 2nd hand smoke exposure years ago   Substance Use Topics     Alcohol use: Never      Wt Readings from Last 1 Encounters:   22 63.5 kg (140 lb)        Anesthesia Evaluation            ROS/MED HX  ENT/Pulmonary:     (+) asthma     Neurologic:       Cardiovascular:       METS/Exercise Tolerance:     Hematologic:       Musculoskeletal:       GI/Hepatic:       Renal/Genitourinary:       Endo:       Psychiatric/Substance Use:     (+) psychiatric history anxiety and depression     Infectious Disease:       Malignancy:       Other:            Physical Exam    Airway  airway exam normal      Mallampati: II   TM distance: > 3 FB   Neck ROM: full   Mouth opening: > 3 cm    Respiratory Devices and Support         Dental  no notable dental history         Cardiovascular          Rhythm and rate: regular and normal     Pulmonary   pulmonary exam normal        breath sounds clear to auscultation           OUTSIDE LABS:  CBC: No results found for: WBC, HGB, HCT, PLT  BMP: No results found for: NA, POTASSIUM, CHLORIDE, CO2, BUN, CR, GLC  COAGS: No results found for: PTT, INR, FIBR  POC:   Lab Results   Component Value Date    HCG Negative 2022     HEPATIC: No results found for: ALBUMIN, PROTTOTAL, ALT, AST, GGT, ALKPHOS,  BILITOTAL, BILIDIRECT, FLOR  OTHER: No results found for: PH, LACT, A1C, DARLINE, PHOS, MAG, LIPASE, AMYLASE, TSH, T4, T3, CRP, SED    Anesthesia Plan    ASA Status:  1   NPO Status:  NPO Appropriate    Anesthesia Type: MAC.     - Reason for MAC: immobility needed, straight local not clinically adequate   Induction: Intravenous.   Maintenance: TIVA.        Consents    Anesthesia Plan(s) and associated risks, benefits, and realistic alternatives discussed. Questions answered and patient/representative(s) expressed understanding.    - Discussed:     - Discussed with:  Patient      - Extended Intubation/Ventilatory Support Discussed: No.      - Patient is DNR/DNI Status: No    Use of blood products discussed: No .     Postoperative Care    Pain management: IV analgesics, Oral pain medications, Multi-modal analgesia.   PONV prophylaxis: Ondansetron (or other 5HT-3), Background Propofol Infusion     Comments:           H&P reviewed: Unable to attach H&P to encounter due to EHR limitations. H&P Update: appropriate H&P reviewed, patient examined. No interval changes since H&P (within 30 days).         Pato Guevara MD

## 2022-04-14 LAB
BKR LAB AP GYN ADEQUACY: NORMAL
BKR LAB AP GYN INTERPRETATION: NORMAL
BKR LAB AP HPV REFLEX: NORMAL
BKR LAB AP PREVIOUS ABNORMAL: NORMAL
PATH REPORT.COMMENTS IMP SPEC: NORMAL
PATH REPORT.COMMENTS IMP SPEC: NORMAL
PATH REPORT.RELEVANT HX SPEC: NORMAL

## 2022-05-02 ENCOUNTER — VIRTUAL VISIT (OUTPATIENT)
Dept: FAMILY MEDICINE | Facility: CLINIC | Age: 27
End: 2022-05-02
Payer: COMMERCIAL

## 2022-05-02 DIAGNOSIS — F64.0 GENDER DYSPHORIA IN ADOLESCENT AND ADULT: Primary | ICD-10-CM

## 2022-05-02 PROCEDURE — 99215 OFFICE O/P EST HI 40 MIN: CPT | Mod: 95 | Performed by: FAMILY MEDICINE

## 2022-05-02 NOTE — PROGRESS NOTES
The patient has been notified of following:       Patient has given verbal consent for Video visit? Yes    Patient would like the video invitation sent by: Send to e-mail at: [facility]    Video Start Time: [id number]    Subjective       Obie is a 27 year old individual who presents today for the following   health issues:    This is a transgender medical evaluation.    IDENTIFICATION:  A 27-year-old agender/nonbinary, assigned female at birth patient.    CHIEF CONCERN:  Hormone therapy.    HISTORY OF PRESENT ILLNESS:  The patient has a long-standing history of gender dysphoria.  The diagnostic assessment of Dr. Bahena was reviewed.  The patient was also seen for an information session on 07/15/2021.  Please see this document for additional gender history.  The patient's goals for hormone therapy were reviewed, is seeking increased muscle mass, decreased body fat to achieve a more masculine body shape, a slightly lower voice, but wishes to avoid facial hair, clitoral growth, avoid balding, with the ultimate goal of taking testosterone until his voice drops somewhat and then discontinuing.  Of note, the patient had a Mirena IUD placed recently in order to assist with achieving amenorrhea.    CURRENT MEDICAL PROBLEMS:  PTSD, anxiety, depression, a vocal cord anomaly, migraines and chronic pain syndrome.    ALLERGIES:  No known drug allergies.    CURRENT MEDICATIONS:  Include trazodone 100 mg and Effexor extended-release 225 mg daily.    FAMILY HISTORY:  Mother with lupus and arthritis.  Father with degenerative disc disease.  Siblings with anxiety, depression.  Sister with bipolar disorder.  An aunt with breast cancer, possibly under age 50.    SOCIAL HISTORY:  The patient eats a standard diet, including dairy.  Walks 2 hours weekly.  Alcohol use once every 2 months, 1 drink per sitting.  No history of substance use.  Works doing art and walking dogs.    SEXUAL HISTORY:  Identifies as Ace, has a girlfriend, does not engage  in any genital sex.  No sexually transmitted infections, has been HPV vaccinated.    REVIEW OF SYSTEMS:  A 12-point review of systems is negative except where noted above.  The patient underwent a primary care visit on 12/15/2021, which was reviewed.  Vital signs at this visit include blood pressure of 128/86, respirations of 18, pulse of 92, weight of 141 pounds 6 ounces, and a BMI of 22.6.  The exam itself was unremarkable including a cardiovascular, respiratory and neck exam.  An abdominal exam was not performed.    ASSESSMENT AND PLAN:   1.  Gender dysphoria.    The masculinizing effects of hormone therapy were discussed at length, along the variability of outcomes and general timeframe for expected masculinizing changes. Permanent vs semi-reversible changes were reviewed.   Reviewed that testosterone is an FDA controlled substance and that all prescriptions must be managed accordingly.  Patient was counseled regarding the potential risks and side effects of masculinizing therapy including:  - reduced fertility, reproductive options, need for ongoing contraception (if indicated) due to effects on developing fetus  -changes to sexual function including clitoral growth  -potential for weight gain, elevated cholesterol, and other indirect metabolic effects on blood pressure or glucose  -increased risk of erythrocytosis and rare risks associated with this including thrombosis   --changes to liver function tests  --mood changes, long term cardiovascular risks, potential undetermined cancer risks.    I discussed the possible risk of temporary or irreversible impairment of the fertility with the patient today. They demonstrated a complete understanding of the fertility preservation options and declined fertility preservation.     Medications used in hormone therapy and methods of administration were reviewed.    Questions were elicited and answered, and patient verbally consent to begin hormone therapy.    Labs: lipid,  hepatic panel. Prior glucose 4/2021 was normal    Patient to follow up after labs complete to begin hormone therapy and determine starting dose of topical testosterone, either with 1% or 1.62% Androgel.      Assessment & Plan   Problem List Items Addressed This Visit        Medium    Gender dysphoria in adolescent and adult - Primary    Relevant Orders    Lipid panel reflex to direct LDL Fasting    Hepatic panel ([place])             42 minutes spent on the date of the encounter doing chart review, review of outside records, patient visit and documentation        Se Mackey MD  [facility]  Video-Visit Details    Type of service:  Video Visit    Video End Time (time video stopped): 916    Originating Location (pt. Location): Home    Distant Location (provider location):  [facility]     Mode of Communication:  Video Conference via video platform: Verena Mackey MD        Results by [facility]  Questions were elicited and answered.

## 2022-05-17 ENCOUNTER — VIRTUAL VISIT (OUTPATIENT)
Dept: PSYCHOLOGY | Facility: CLINIC | Age: 27
End: 2022-05-17
Payer: COMMERCIAL

## 2022-05-17 DIAGNOSIS — F64.0 GENDER DYSPHORIA IN ADOLESCENT AND ADULT: ICD-10-CM

## 2022-05-17 DIAGNOSIS — F41.1 GENERALIZED ANXIETY DISORDER: ICD-10-CM

## 2022-05-17 DIAGNOSIS — F33.1 MAJOR DEPRESSIVE DISORDER, RECURRENT EPISODE, MODERATE (H): Primary | ICD-10-CM

## 2022-05-17 PROCEDURE — 99207 PR NO BILLABLE SERVICE THIS VISIT: CPT | Performed by: STUDENT IN AN ORGANIZED HEALTH CARE EDUCATION/TRAINING PROGRAM

## 2022-05-17 PROCEDURE — 90834 PSYTX W PT 45 MINUTES: CPT | Mod: 95 | Performed by: STUDENT IN AN ORGANIZED HEALTH CARE EDUCATION/TRAINING PROGRAM

## 2022-05-17 NOTE — PROGRESS NOTES
[facility] - Progress Note    Date of Service: 22   Name: Ramila Woods  : 1995  Medical Record Number: [id number]  Treating Provider: Nabil Bahena  Type of Session: Individual  Present in Session: Client   Session Start and Stop Time: 4:00-4:51pm  Number of Minutes: 51    SERVICE MODALITY:  Video Visit:      Provider verified identity through the following two step process.  Patient provided:  Patient is known previously to provider    Telemedicine Visit: The patient's condition can be safely assessed and treated via synchronous audio and visual telemedicine encounter.      Reason for Telemedicine Visit: Patient has requested telehealth visit    Originating Site (Patient Location): Patient's home    Distant Site (Provider Location): Provider Remote Setting- Home Office    Consent:  The patient/guardian has verbally consented to: the potential risks and benefits of telemedicine (video visit) versus in person care; bill my insurance or make self-payment for services provided; and responsibility for payment of non-covered services.     Patient would like the video invitation sent by:  My Chart    Mode of Communication:  Video Conference via [facility]    As the provider I attest to compliance with applicable laws and regulations related to telemedicine.    DSM-5 Diagnoses:  296.32 (F33.1) Major Depressive Disorder, Recurrent, Moderate  300.02 (F41.1) Generalized Anxiety Disorder  302.85 (F64.0) Gender Dysphoria in adolescent and adult    Current Reported Symptoms and Status update:  Client has an established history of experiencing gender dysphoria, psychological distress stemming from incongruence with gender identity and sex assigned at birth exacerbated by primary and secondary sex characteristics. Client reported experiencing the above-mentioned symptoms as well as several depressive symptoms (sadness, fatigue, low energy and interest in pleasurable activities, low motivation,  "sleep disturbance: difficulty stay asleep and waking up several times in the middle of the night). Client reports ongoing concerns related to gender dysphoria and navigating interpersonal relationships. Client reported a history of family trauma related to substance use and addiction, difficulties being accepted as agender/non-binary by family, and navigating societal expectations of relationships.     Client also has a history of non-suicidal self-injurious behavior (biting self without breaking skin, pulling hair, pinching and squeezing skin). Their most recent instance of this was in March of 2022 and overall, they do not engage in this behavior regularly.    Progress Toward Treatment Goals:   Stable - ACTION (Actively working towards change); Intervened by reinforcing change plan / affirming steps taken    Therapeutic Interventions/Treatment Strategies:  Area(s) of treatment focus addressed in this session included exploring client's ongoing symptoms of depression, anxiety, and gender dysphoria. Client expressed feeling overwhelmed with their workload and other work-related stressors. We also focused on coping skills and strategies to improve tolerance.    Psychotherapist offered support, feedback and validation and reinforced use of skills. Treatment modalities used include Cognitive Behavioral Therapy. Interventions include Behavioral Activation: Encouraged strategies to reduce individual procrastination and increase motivation by increasing goal-directed activities to enhance mood and reduce symptoms. and Coping Skills: Discussed how the use of intentional \"in the moment\" actions can help reduce distress, Reviewed patients current calming practices and discussed a more formal way of practicing and accessing skills, Promoted understanding of how and when to apply grounding strategies to reduce distress and increase presence in the moment and Addressed barriers to utilizing coping skills when in " distress.    Patient Response:   Patient responded to session by accepting feedback, listening, focusing on goals, being attentive, accepting support, appearing alert and verbalizing understanding  Possible barriers to participation / learning include: and no barriers identified    Current Mental Status Exam:   Appearance:  Appropriate   Eye Contact:  Good   Attitude / Demeanor: Cooperative  Interested Pleasant  Speech      Rate / Production: Normal/ Responsive      Volume:  Normal  volume  Orientation:  All  Mood:   Anxious  Depressed   Affect:   Appropriate   Thought Content: Rumination   Insight:   Good     Plan/Need for Future Services:  Return for therapy in 2 weeks to treat diagnosed problems.    Patient has a current master individualized treatment plan.  See Epic treatment plan for more information.    Assignment:  None    Interactive Complexity:  There are four specific communication difficulties that complicate the work of the primary psychiatric procedure.  Interactive complexity (+[id number]) may be reported when at least one of these difficulties is present.    Communication difficulties present during current the psychiatric procedure include:  1. None        Nabil Bahena, PhD  Postdoctoral Fellow

## 2022-06-17 ENCOUNTER — TELEPHONE (OUTPATIENT)
Dept: FAMILY MEDICINE | Facility: CLINIC | Age: 27
End: 2022-06-17
Payer: COMMERCIAL

## 2022-09-16 ENCOUNTER — LAB (OUTPATIENT)
Dept: LAB | Facility: CLINIC | Age: 27
End: 2022-09-16
Payer: COMMERCIAL

## 2022-09-16 DIAGNOSIS — F64.0 GENDER DYSPHORIA IN ADOLESCENT AND ADULT: ICD-10-CM

## 2022-09-16 PROCEDURE — 80076 HEPATIC FUNCTION PANEL: CPT

## 2022-09-16 PROCEDURE — 80061 LIPID PANEL: CPT

## 2022-09-16 PROCEDURE — 36415 COLL VENOUS BLD VENIPUNCTURE: CPT

## 2022-09-17 LAB
ALBUMIN SERPL-MCNC: 3.8 G/DL (ref 3.4–5)
ALP SERPL-CCNC: 65 U/L (ref 40–150)
ALT SERPL W P-5'-P-CCNC: 18 U/L (ref 0–70)
AST SERPL W P-5'-P-CCNC: 14 U/L (ref 0–45)
BILIRUB DIRECT SERPL-MCNC: <0.1 MG/DL (ref 0–0.2)
BILIRUB SERPL-MCNC: 0.3 MG/DL (ref 0.2–1.3)
CHOLEST SERPL-MCNC: 146 MG/DL
FASTING STATUS PATIENT QL REPORTED: YES
HDLC SERPL-MCNC: 56 MG/DL
LDLC SERPL CALC-MCNC: 80 MG/DL
NONHDLC SERPL-MCNC: 90 MG/DL
PROT SERPL-MCNC: 7 G/DL (ref 6.8–8.8)
TRIGL SERPL-MCNC: 52 MG/DL

## 2022-09-19 NOTE — RESULT ENCOUNTER NOTE
Hello!  Your cholesterol and liver labs are normal. Follow up with Dr. Mackey as planned    Sincerely,   Julia Mariscal MD (covering for Dr. Mackey)

## 2022-10-03 ENCOUNTER — OFFICE VISIT (OUTPATIENT)
Dept: FAMILY MEDICINE | Facility: CLINIC | Age: 27
End: 2022-10-03
Payer: COMMERCIAL

## 2022-10-03 VITALS
BODY MASS INDEX: 23.4 KG/M2 | SYSTOLIC BLOOD PRESSURE: 104 MMHG | DIASTOLIC BLOOD PRESSURE: 64 MMHG | TEMPERATURE: 97.5 F | WEIGHT: 145 LBS | HEART RATE: 77 BPM

## 2022-10-03 DIAGNOSIS — F64.0 GENDER DYSPHORIA IN ADULT: Primary | ICD-10-CM

## 2022-10-03 PROCEDURE — 99213 OFFICE O/P EST LOW 20 MIN: CPT | Performed by: FAMILY MEDICINE

## 2022-10-03 RX ORDER — ATOMOXETINE 40 MG/1
CAPSULE ORAL
COMMUNITY
Start: 2022-08-10

## 2022-10-03 RX ORDER — TESTOSTERONE 1.62 MG/G
1 GEL TRANSDERMAL DAILY
Qty: 150 G | Refills: 0 | Status: SHIPPED | OUTPATIENT
Start: 2022-10-03 | End: 2023-08-15 | Stop reason: SINTOL

## 2022-10-03 ASSESSMENT — ENCOUNTER SYMPTOMS
CHILLS: 0
FEVER: 0
SHORTNESS OF BREATH: 0

## 2022-10-03 NOTE — PROGRESS NOTES
HPI     Obie is a 27 year old individual that uses pronouns They/Them/Their/Theirs that presents today for follow up of:  masculinizing hormone therapy.   Alone or accompanied by: accompanied today by  Gender identity: other agender  Started Hormone  therapy  n/a  Continues on Other n/a*.   Any special concerns today?    Last seen 5/2022, had hard time doing labs, some life events, now read to start hormones.  Reviewed hormone goals; plan to accomplish this is to take testosterone until voice drops somewhat and then discontinue or decrease dose as desired.   On hormones?  No  Gender related body changes since last visit: n/a    Breakthrough bleeding? No: Mirena IUD, mild spotting    New health concerns since last visit:  ---none    Past Surgical History:   Procedure Laterality Date     EXAM UNDER ANESTHESIA PELVIC N/A 4/12/2022    Procedure: EXAM UNDER ANESTHESIA, PELVIS: PAP SMEAR, MIRENA INSERTION, INTRAUTERINE DEVICE;  Surgeon: Graciela Colón MD;  [place] [facility] [place]     TRANSGENDER MASTECTOMY Bilateral 9/17/2021    Procedure: MASTECTOMY, BILATERAL, SIMPLE, nipple grafts. OnQ.;  Surgeon: Radha Tan MD;  [place] [facility] [place]       Patient Active Problem List   Diagnosis     Vocal cord anomaly     TMJ (temporomandibular joint disorder)     Mild episode of recurrent major depressive disorder (H)     Insomnia, unspecified     History of self injurious behavior     LOVE (generalized anxiety disorder)     Exercise-induced asthma     Environmental allergies     Depression     Chronic post-traumatic stress disorder (PTSD)     Gender dysphoria in adolescent and adult     Depression, major, single episode, severe (H)     Suppression of menses, desires     Screening for malignant neoplasm of cervix       Current Outpatient Medications   Medication Sig Dispense Refill     traZODone (DESYREL) 100 MG tablet Take 100 mg by mouth At Bedtime       venlafaxine (EFFEXOR-ER) 225 MG 24 hr tablet Take 225 mg  by mouth daily         History   Smoking Status     Passive Smoke Exposure - Never Smoker   Smokeless Tobacco     Never Used     Comment: 2nd hand smoke exposure years ago          Allergies   Allergen Reactions     Dust Mites      Seasonal Allergies        There are no preventive care reminders to display for this patient.      Problem, Medication and Allergy Lists were reviewed and are current..         Review of Systems:   Review of Systems   Constitutional: Negative for chills and fever.   Respiratory: Negative for shortness of breath.    Cardiovascular: Negative for chest pain.              Labs:   Results from last visit:  Lab on 09/16/2022   Component Date Value Ref Range Status     Cholesterol 09/16/2022 146  <200 mg/dL Final     Triglycerides 09/16/2022 52  <150 mg/dL Final     Direct Measure HDL 09/16/2022 56  >=40 mg/dL Final     LDL Cholesterol Calculated 09/16/2022 80  <=100 mg/dL Final     Non HDL Cholesterol 09/16/2022 90  <130 mg/dL Final     Patient Fasting > 8hrs? 09/16/2022 Yes   Final     Bilirubin Total 09/16/2022 0.3  0.2 - 1.3 mg/dL Final     Bilirubin Direct 09/16/2022 <0.1  0.0 - 0.2 mg/dL Final     Protein Total 09/16/2022 7.0  6.8 - 8.8 g/dL Final     Albumin 09/16/2022 3.8  3.4 - 5.0 g/dL Final     Alkaline Phosphatase 09/16/2022 65  40 - 150 U/L Final    Female:    0-3 years  110-320 U/L  4-9 years  150-420 U/L  10-11 years  130-560 U/L  12-13 years  105-420 U/L  14-15 years   U/L  16 years and older   U/L      Male:  0-3 years  110-320 U/L  4-9 years  150-420 U/L  10-15 years  130-530 U/L  16-19 years   U/L  20 years and older   U/L       AST 09/16/2022 14  0 - 45 U/L Final     ALT 09/16/2022 18  0 - 70 U/L Final    Female    All ages 0-50 U/L    Male    0 - 19 years       0-50 U/L  20 years and older 0-70 U/L                  EXAM:  Constitutional: healthy, alert and no distress   Cardiovascular: negative, PMI normal. No lifts, heaves, or thrills. RRR. No  murmurs, clicks gallops or rub  Respiratory: negative, Percussion normal. Good diaphragmatic excursion. Lungs clear --rare wheeze    Assessment and Plan   1. Gender dysphoria  Start Androgel 20.25 mg daily  Instructions given on how to apply, transfer precautions    Follow-up 1-2  Months    Contraception:   IUD           Results by mychart  Questions were elicited and answered.     Se Mackey MD

## 2022-10-06 ENCOUNTER — TELEPHONE (OUTPATIENT)
Dept: FAMILY MEDICINE | Facility: CLINIC | Age: 27
End: 2022-10-06

## 2022-10-06 NOTE — TELEPHONE ENCOUNTER
Central Prior Authorization Team   Phone: [phone]      Prior Authorization Retail Medication Request    Medication/Dose: (ANDROGEL 1.62 % PUMP) 20.25 MG/ACT gel - Brand (pharmacy is submitting for brand medication, still rejecting)  Pharmacy has called several times (total of 7 times called to request PA)  ICD code (if different than what is on RX):  Gender dysphoria in adult [F64.0]    Previously Tried and Failed:    Rationale:      Insurance Name:  [employer] [place]  Insurance ID:  [id number]      Pharmacy Information (if different than what is on RX)  Name:  [facility] - NPI [id number]  Phone:  [phone]

## 2022-10-07 NOTE — TELEPHONE ENCOUNTER
Rec'd a fax from insurance requesting additional information. I have completed most of it, but am unable to find the required labwork they request. Checking w/ clinic

## 2022-10-07 NOTE — TELEPHONE ENCOUNTER
Central Prior Authorization Team   Phone: [phone]      PA Initiation - downloaded P/A form, completed, faxed in. Unable to complete via CoverMyMeds - I've submitted the P/A request to insurance as an URGENT request.    Medication: (ANDROGEL 1.62 % PUMP) 20.25 MG/ACT gel - Brand   Insurance Company: [employer] - Phone [phone] Fax [phone]  Pharmacy Filling the Rx: [facility] - [place], [place] - [address]  Filling Pharmacy Phone: [phone]  Filling Pharmacy Fax:    Start Date: 10/7/2022

## 2022-10-10 NOTE — TELEPHONE ENCOUNTER
I didn't return the form back in time (stating they don't have any labs to show yet).    Rec'd a denial over the weekend for other reasons. Which we can probably combat in a Letter of Medical Necessity, as none of this criteria was asked about on the P/A form.    PRIOR AUTHORIZATION DENIED    Medication: (ANDROGEL 1.62 % PUMP) 20.25 MG/ACT gel - Brand     Denial Date: 10/7/2022    Denial Rational:             Appeal Information:

## 2022-10-12 ENCOUNTER — TELEPHONE (OUTPATIENT)
Dept: FAMILY MEDICINE | Facility: CLINIC | Age: 27
End: 2022-10-12

## 2022-10-12 NOTE — TELEPHONE ENCOUNTER
Pharmacist from [facility] called for Prior Auth for test gel    Callback # is [phone].     Marked as Urgent - Patient is persistent with pharmacy, and they're hoping to get this completed ASAP.

## 2022-10-13 NOTE — TELEPHONE ENCOUNTER
Medication Appeal Initiation      Medication: (ANDROGEL 1.62 % PUMP) 20.25 MG/ACT gel - Brand   Appeal Start Date:  10/13/2022  Insurance Company:  [employer]  Comments:  Appeal has been initiated.  I have faxed original denial letter along with Letter of Medical Necessity (letters tab) to [employer] - Attn: Appeals Dept, fax# [phone]. Marked for urgent review.

## 2022-10-14 NOTE — TELEPHONE ENCOUNTER
MEDICATION APPEAL APPROVED    Medication: (ANDROGEL 1.62 % PUMP) 20.25 MG/ACT gel - Brand   Authorization Effective Date:  07/16/2022  Authorization Expiration Date:  10/14/2023  Approved Dose/Quantity: 150gm per 20 days  Reference #: [id number]   Insurance Company:  [employer]  Expected CoPay:       Which Pharmacy is filling the prescription (Not needed for infusion/clinic administered): [facility] - [place], [place] - [address]  Pharmacy Notified: Yes, he was able to get the rx processed through insurance while on the phone with me. Confirmed he will call patient when rx is ready.

## 2022-10-22 ENCOUNTER — HEALTH MAINTENANCE LETTER (OUTPATIENT)
Age: 27
End: 2022-10-22

## 2023-04-01 ENCOUNTER — HEALTH MAINTENANCE LETTER (OUTPATIENT)
Age: 28
End: 2023-04-01

## 2023-04-19 NOTE — PROGRESS NOTES
I did not personally see the patient. I reviewed and agree with the assessment and plan of this note.     Catia Solis, PhD LP   no

## 2023-08-15 ENCOUNTER — VIRTUAL VISIT (OUTPATIENT)
Dept: FAMILY MEDICINE | Facility: CLINIC | Age: 28
End: 2023-08-15
Payer: COMMERCIAL

## 2023-08-15 DIAGNOSIS — F64.0 TRANSGENDER PERSON ON HORMONE THERAPY: Primary | ICD-10-CM

## 2023-08-15 DIAGNOSIS — Z79.899 TRANSGENDER PERSON ON HORMONE THERAPY: Primary | ICD-10-CM

## 2023-08-15 PROCEDURE — 99214 OFFICE O/P EST MOD 30 MIN: CPT | Mod: 95 | Performed by: FAMILY MEDICINE

## 2023-08-15 ASSESSMENT — ANXIETY QUESTIONNAIRES
7. FEELING AFRAID AS IF SOMETHING AWFUL MIGHT HAPPEN: SEVERAL DAYS
4. TROUBLE RELAXING: MORE THAN HALF THE DAYS
3. WORRYING TOO MUCH ABOUT DIFFERENT THINGS: SEVERAL DAYS
1. FEELING NERVOUS, ANXIOUS, OR ON EDGE: SEVERAL DAYS
2. NOT BEING ABLE TO STOP OR CONTROL WORRYING: SEVERAL DAYS
5. BEING SO RESTLESS THAT IT IS HARD TO SIT STILL: SEVERAL DAYS
GAD7 TOTAL SCORE: 7
GAD7 TOTAL SCORE: 7
6. BECOMING EASILY ANNOYED OR IRRITABLE: NOT AT ALL

## 2023-08-15 ASSESSMENT — PATIENT HEALTH QUESTIONNAIRE - PHQ9
SUM OF ALL RESPONSES TO PHQ QUESTIONS 1-9: 12
10. IF YOU CHECKED OFF ANY PROBLEMS, HOW DIFFICULT HAVE THESE PROBLEMS MADE IT FOR YOU TO DO YOUR WORK, TAKE CARE OF THINGS AT HOME, OR GET ALONG WITH OTHER PEOPLE: VERY DIFFICULT
SUM OF ALL RESPONSES TO PHQ QUESTIONS 1-9: 12

## 2023-08-15 NOTE — PROGRESS NOTES
Virtual Visit Details    Type of service:  Video Visit     Originating Location (pt. Location): Home    Distant Location (provider location):  On-site  Platform used for Video Visit: "[employer]      Answers submitted by the patient for this visit:  Patient Health Questionnaire (Submitted on 8/15/2023)  If you checked off any problems, how difficult have these problems made it for you to do your work, take care of things at home, or get along with other people?: Very difficult  PHQ9 TOTAL SCORE: 12  LOVE-7 (Submitted on 8/15/2023)  LOVE 7 TOTAL SCORE: 7

## 2023-08-15 NOTE — PROGRESS NOTES
The patient has been notified of following:       Patient has given verbal consent for Video visit? Yes    Patient would like the video invitation sent by: Send to e-mail at:     Video Start Time: 3:06 PM             KATHARINE Ragland is a 28 year old individual that uses pronouns They/Them/Their/Theirs that presents today for follow up of:  masculinizing hormone therapy.   Alone or accompanied by: accompanied today by  Gender identity: other agender  Started Hormone  therapy  10/2022  Continues on Androgel/generic 20.25 mg daily  Any special concerns today?   Last seen when started hormones, however still has not run out of Androgel. Would go a couple months without taking it  Don't like how it much it increases libido.   Wonders if another way to decrease voice without increasing libido  Wonders about facial masculinizing. surgery  On hormones?  No  Gender related body changes since last visit: no permanent changes    Breakthrough bleeding? Does Not Apply    New health concerns since last visit:  ---    Past Surgical History:   Procedure Laterality Date    EXAM UNDER ANESTHESIA PELVIC N/A 4/12/2022    Procedure: EXAM UNDER ANESTHESIA, PELVIS: PAP SMEAR, MIRENA INSERTION, INTRAUTERINE DEVICE;  Surgeon: Graciela Colón MD;  Location: [facility] [place]    TRANSGENDER MASTECTOMY Bilateral 9/17/2021    Procedure: MASTECTOMY, BILATERAL, SIMPLE, nipple grafts. OnQ.;  Surgeon: Radha Tan MD;  Location: [facility] [place]       Patient Active Problem List   Diagnosis    Vocal cord anomaly    TMJ (temporomandibular joint disorder)    Mild episode of recurrent major depressive disorder (H)    Insomnia, unspecified    History of self injurious behavior    LOVE (generalized anxiety disorder)    Exercise-induced asthma    Environmental allergies    Depression    Chronic post-traumatic stress disorder (PTSD)    Gender dysphoria in adolescent and adult    Depression, major, single episode, severe (H)    Suppression of menses, desires     Screening for malignant neoplasm of cervix       Current Outpatient Medications   Medication Sig Dispense Refill    atomoxetine (STRATTERA) 40 MG capsule       testosterone (ANDROGEL 1.62 % PUMP) 20.25 MG/ACT gel Place 1 Pump onto the skin daily Apply from dispenser to clean, dry, intact skin of the upper arms and shoulders. 150 g 0    traZODone (DESYREL) 100 MG tablet Take 100 mg by mouth At Bedtime      venlafaxine (EFFEXOR-ER) 225 MG 24 hr tablet Take 225 mg by mouth daily         History   Smoking Status    Passive Smoke Exposure - Never Smoker   Smokeless Tobacco    Never     Comment: 2nd hand smoke exposure years ago          Allergies   Allergen Reactions    Dust Mites     Seasonal Allergies        Health Maintenance Due   Topic Date Due    MENTAL HEALTH TX PLAN  02/21/2023         Problem, Medication and Allergy Lists were reviewed and are current..         Review of Systems:   Review of Systems           Labs:   Results from last visit:  Lab on 09/16/2022   Component Date Value Ref Range Status    Cholesterol 09/16/2022 146  <200 mg/dL Final    Triglycerides 09/16/2022 52  <150 mg/dL Final    Direct Measure HDL 09/16/2022 56  >=40 mg/dL Final    LDL Cholesterol Calculated 09/16/2022 80  <=100 mg/dL Final    Non HDL Cholesterol 09/16/2022 90  <130 mg/dL Final    Patient Fasting > 8hrs? 09/16/2022 Yes   Final    Bilirubin Total 09/16/2022 0.3  0.2 - 1.3 mg/dL Final    Bilirubin Direct 09/16/2022 <0.1  0.0 - 0.2 mg/dL Final    Protein Total 09/16/2022 7.0  6.8 - 8.8 g/dL Final    Albumin 09/16/2022 3.8  3.4 - 5.0 g/dL Final    Alkaline Phosphatase 09/16/2022 65  40 - 150 U/L Final    Female:    0-3 years  110-320 U/L  4-9 years  150-420 U/L  10-11 years  130-560 U/L  12-13 years  105-420 U/L  14-15 years   U/L  16 years and older   U/L      Male:  0-3 years  110-320 U/L  4-9 years  150-420 U/L  10-15 years  130-530 U/L  16-19 years   U/L  20 years and older   U/L      AST 09/16/2022  14  0 - 45 U/L Final    ALT 09/16/2022 18  0 - 70 U/L Final    Female    All ages 0-50 U/L    Male    0 - 19 years       0-50 U/L  20 years and older 0-70 U/L                EXAM:  Constitutional: healthy, alert, and no distress   Psychiatric: mentation appears normal, anxious, and concentration poor     Assessment and Plan   Gender dysphoria  Counseled that there is  no hormonal intervention to change voice wtihout increase libido; Will discontinue testosterone  Patient elects referral to voice therapy  Is interested in facial masculinization surgery--discussed need gender therapist for any surgical interventions--will refer to [facility]      Follow-up prn  Video-Visit Details    Type of service:  Video Visit    Video End Time (time video stopped): 316    Originating Location (pt. Location): Home    Distant Location (provider location):  [facility]     Mode of Communication:  Video Conference via video platform: Verena Mackey MD      Results by [facility]  Questions were elicited and answered.     Se Mackey MD    Answers submitted by the patient for this visit:  Patient Health Questionnaire (Submitted on 8/15/2023)  If you checked off any problems, how difficult have these problems made it for you to do your work, take care of things at home, or get along with other people?: Very difficult  PHQ9 TOTAL SCORE: 12  LOVE-7 (Submitted on 8/15/2023)  LOVE 7 TOTAL SCORE: 7

## 2023-08-15 NOTE — NURSING NOTE
Is the patient currently in the state of [place]? YES - at home.    Visit mode:VIDEO    If the visit is dropped, the patient can be reconnected by: VIDEO VISIT:  Send e-mail to at [email or web address]    Will anyone else be joining the visit? No  (If patient encounters technical issues they should call [phone])    How would you like to obtain your AVS? MyChart    Are changes needed to the allergy or medication list? N/A    Rooming Documentation: Questionnaire(s) not done per department protocol.    Unable to complete rooming due to time.    Reason for visit: RECHECK     Sherley Fernandes, VVF

## 2023-10-02 NOTE — TELEPHONE ENCOUNTER
FUTURE VISIT INFORMATION      FUTURE VISIT INFORMATION:  Date: 12/29/23  Time: 3:00pm  Location: [facility] [place]  REFERRAL INFORMATION:  Reason for visit/diagnosis  Gender Care    RECORDS REQUESTED FROM:       No recs to collect

## 2023-11-24 NOTE — TELEPHONE ENCOUNTER
FUTURE VISIT INFORMATION      FUTURE VISIT INFORMATION:  Date: 2/15/24  Time: 3:00pm  [place] [facility] [place]  REFERRAL INFORMATION:  Reason for visit/diagnosis  gender care    RECORDS REQUESTED FROM:       No recs to collect

## 2023-12-29 ENCOUNTER — PRE VISIT (OUTPATIENT)
Dept: OTOLARYNGOLOGY | Facility: CLINIC | Age: 28
End: 2023-12-29

## 2024-02-15 ENCOUNTER — PRE VISIT (OUTPATIENT)
Dept: OTOLARYNGOLOGY | Facility: CLINIC | Age: 29
End: 2024-02-15

## 2024-02-15 ENCOUNTER — OFFICE VISIT (OUTPATIENT)
Dept: OTOLARYNGOLOGY | Facility: CLINIC | Age: 29
End: 2024-02-15
Payer: COMMERCIAL

## 2024-02-15 DIAGNOSIS — R49.0 DYSPHONIA: ICD-10-CM

## 2024-02-15 DIAGNOSIS — R05.3 CHRONIC COUGH: Primary | ICD-10-CM

## 2024-02-15 DIAGNOSIS — J38.7 IRRITABLE LARYNX: ICD-10-CM

## 2024-02-15 PROCEDURE — 92507 TX SP LANG VOICE COMM INDIV: CPT | Mod: GN | Performed by: SPEECH-LANGUAGE PATHOLOGIST

## 2024-02-15 PROCEDURE — 92524 BEHAVRAL QUALIT ANALYS VOICE: CPT | Mod: GN | Performed by: SPEECH-LANGUAGE PATHOLOGIST

## 2024-02-15 NOTE — PROGRESS NOTES
"[facility] VOICE [facility]  Dexter Wang Jr., M.D., F.A.C.S.  Jasmyn Cowart M.D., M.P.H.  Paty Macias M.D.  Earnestine Byrd M.M. (voice), M.A., CCC-SLP  Rody Viramontes M.S., CCC-SLP  Marli Hook, Ph.D., CCC-SLP  Nuno Diaz, Ph.D., CCC-SLP  Kaylie Pimentel M.S., CCC-SLP  Sagar Archibald M.M., M.A., CCC-SLP  RODRICK Armenta (voice), M.S., CCC-SLP    [facility]  VOICE/SPEECH/BREATHING THERAPY  CLINICAL FOLLOW-UP AND LARYNGEAL EXAMINATION REPORT - IN PERSON    Clinician: Earnestine Byrd M.M. (voice), MROSA, CCC-SLP  Referring physician:  Self  Patient: Obie Woods  Date of Visit: 2/15/2024    HISTORY  PATIENT INFORMATION  Peggy was seen for follow-up today.  Please also refer to Obie Woods was last seen on 5/4/21 for gender affirming voice therapy.    Impressions from most recent evaluation (12/23/20):  \"IMPRESSIONS: Obie Woods is a 25 year old adult, presenting today with voice and resonance disorder in the context of gender dysphoria, as well as dysphonia and dyspnea, as evidenced by today's evaluation.   \"    PROGRESS SINCE LAST VISIT    Peggy reports:  Reported that they had some issues with a cough last Fall, but they have since resolved.  Still they return today as the symptoms they experienced were quire significant.   voice quality great and authentic; no concerns at this time    PROGRESS ON LONG-TERM GOALS: set back with vocal hygiene.  Voice meets their personal goals and needs.      PATIENT REPORTED MEASURES  Patient Supplied Answers To VHI Questionnaire      5/4/2021    11:00 AM   Voice Handicap Index (VHI-10)   My voice makes it difficult for people to hear me 1   People have difficulty understanding me in a noisy room 2   My voice difficulties restrict my personal and social life.  1   I feel left out of conversations because of my voice 0   My voice problem causes me to lose income 0   I feel as though I have to strain to produce voice 2   The clarity of my voice is " "unpredictable 1   My voice problem upsets me 1   My voice makes me feel handicapped 1   People ask, \"What's wrong with your voice?\" 0   VHI-10 9     Patient Supplied Answers To VHI Questionnaire      2/15/2024     3:00 PM   Voice Handicap Index (VHI-10)   My voice makes it difficult for people to hear me 2   People have difficulty understanding me in a noisy room 3   My voice difficulties restrict my personal and social life.  0   I feel left out of conversations because of my voice 0   My voice problem causes me to lose income 0   I feel as though I have to strain to produce voice 2   The clarity of my voice is unpredictable 2   My voice problem upsets me 1   My voice makes me feel handicapped 0   People ask, \"What's wrong with your voice?\" 0   VHI-10 10       Patient Supplied Answers To CSI Questionnaire       No data to display                Patient Supplied Answers To EAT Questionnaire       No data to display                  OBJECTIVE FINDINGS  PERCEPTUAL EVALUATION (CPT 71978)  POSTURE / TENSION:   WNL    BREATHING:   appears within normal limits and adequate     LARYNGEAL PALPATION:   supple musculature    VOICE:  Roughness: Minimal Intermittent  Breathiness: WNL  Strain: WNL  Loudness  Conversational speech:  WNL  Projected speech:  WNL  Pitch:  F0/ Habitual Pitch: informally judged to be WNL  Global Overall Severity:  5/100    COUGH/THROAT CLEARING:  Not observed    LARYNGEAL FUNCTION STUDIES (CPT 86838)  Captured for record only - primary complaint is cough not voice  /a/ mean f0 = 162.194 Hz (SD = 2.123)  /i/ mean f0 = 165.849 Hz (SD = 2.109)  Glide:     Lowest f0 = 113.156 Hz      Higest f0 = 1292.144 Hz      Range = 1178.988 Hz   Connected Speech     Mean f0 = 143.942 Hz (SD 32.665)     Median f0 = 138.080 Hz      Lowest f0 = 98.682 Hz      Highest f0 = 498.565 Hz      Speaking Range = 399.883 Hz       LARYNGEAL EXAMINATION  N/a    IMPRESSIONS/ RECOMMENDATIONS/ PLAN  IMPRESSIONS: Obie Peggy is a 29 " year old, presenting today for follow-up with Irritable Larynx Syndrome (ILS) (J38.7), Chronic Cough (R05.3), Dysphonia (R49.0), and Voice and Resonance Disorder (R49.9).  They were seen in the past for voice concerns related to vocal dysphoria, however, their current concerns were related to a severe cough that started last Fall with illness and eventually resolved.  Remarkable findings included:  Perceptual evaluation demonstrated:   Roughness: Minimal Intermittent  Breathiness: WNL  Strain: WNL  Laryngeal exam demonstrated:   Not warranted  Primary complaint of patient today included:   Seeking education to help manage any future severe bouts of coughing.   Therefore, we recommended a course of speech therapy to address these concerns.    STIMULABILITY: results of therapy probes during perceptual and laryngeal evaluation demonstrate improvement with use of forward resonant stimuli, coordination of respiration and phonation, and use of yawn sigh    RECOMMENDATIONS:   An single session of speech therapy is recommended to optimize vocal technique and improve voice quality.  Research: This patient is not - cough has resolved.    GOALS:  Patient goal:   To improve and maintain a healthy voice quality  To understand the problem and fix it as much as possible  To learn strategies to suppress future severe coughs to acceptable levels    Short-term goal(s): Within the first 4 sessions,   Peggy:  will be able to demonstrate provided cough suppression and substitution strategies from memory independently with 90% accuracy  will be able to independently list key factors in maintenance of good vocal hygiene with 80% accuracy, and report on their use outside the therapy room.  will demonstrate semi-occluded vocal tract (SOVT) exercises with at least 80% accuracy with no clinician support    Long-term goal(s): In 6 months,   Peggy will:  Report resolution of symptoms, and use of optimal voice quality and comfort to meet  personal, social, and professional needs, 90% of the time during a typical week of vocal activities    Certification period:   Certification date from: 2/15/24  Certification date to: 5/15/24    This treatment plan was developed with the patient who agreed with the recommendations.    _______________________________________________________________________  THERAPY NOTE (CPT 24916)  Date of Service: 2/15/2024    SUBJECTIVE / OBJECTIVE:  Please refer to my evaluation report from today's encounter for full details regarding subjective data, patient reported measures, and diagnostic findings.    THERAPEUTIC ACTIVITIES  Exercises and techniques for optimal vocal hygiene including:  Systemic hydration, including strategies for increasing daily water intake  Topical hydration - Gargling (saline and plain water), saline nasal irrigation, humidification, steam, guaifenesin to reduce the thickened secretions / laryngeal irritation.    Chronic cough / throat clearing reduction therapy  Obie Woods is most bothered by: Cough, but it has since resolved to WNL  Suppression and substitution strategies were instructed including  Swallowing substitution techniques  Breathing suppression techniques to reduce laryngeal tension  Low impact glottic coup and soft cough  Techniques to raise awareness of habitual throat clearing  Additionally Obie Woods was instructed to keep a log of what circumstances are eliciting cough / throat clear    Counseling and Education  Asked many questions about the nature of Obie Woods's symptoms, and I answered all of these thoroughly.  Concepts of an optimal regimen for practice were instructed.  Obie Woods should use an interval schedule of practice, with brief periods of practice frequently throughout each day  Saxman concepts of volitional practice to facilitate motor learning.  A revised regimen for home practice was instructed.  I provided an audio recording and handouts of today's  therapeutic activities to facilitate practice.    ASSESSMENT/PLAN  PROGRESS TOWARD LONG TERM GOALS:   Good progress; please see report above for objective measures    IMPRESSIONS:  Irritable Larynx Syndrome (ILS) (J38.7), Chronic Cough (R05.3), Dysphonia (R49.0), and Voice and Resonance Disorder (R49.9)..   Peggy demonstrated good learning of therapeutic activities to optimize cough suppression and voice quality.  They will contact me in the future as needed, but we agreed that only one session was necessary.     PLAN: Obie Woods will continue independently at this time.    TOTAL SERVICE TIME: 60 minutes  EVALUATION OF VOICE AND RESONANCE (85408)  TREATMENT (72900)  NO CHARGE FACILITY FEE (68637)    Earnestine Byrd M.M. (voice), M.A., CCC/SLP  Speech-Language Pathologist  Certificate of Vocology  [facility] Voice [facility]  [phone]  [email or web address]  Pronouns: she/her

## 2024-02-15 NOTE — LETTER
"2/15/2024       RE: Ramila Woods  [address]  [address]  [facility] [address]     Dear Colleague,    Thank you for referring your patient, Ramila Woods, to the [facility] at [facility]. Please see a copy of my visit note below.    [facility]  Dexter Wang Jr., M.D., F.A.C.S.  Jasmyn Cowart M.D., M.P.H.  Paty Macias M.D.  Earnestine Byrd M.M. (voice), M.A., CCC-SLP  Rody Viramontes M.S., CCC-SLP  Marli Hook, Ph.D., CCC-SLP  Nuno Diaz, Ph.D., CCC-SLP  Kaylie Pimentel M.S., CCC-SLP  Sagar Archibald M.M., M.A., CCC-SLP  ORDRICK Armenta (voice), M.S., CCC-SLP    [facility]  VOICE/SPEECH/BREATHING THERAPY  CLINICAL FOLLOW-UP AND LARYNGEAL EXAMINATION REPORT - IN PERSON    Clinician: Earnestine Byrd M.M. (voice), MROSA, CCC-SLP  Referring physician:  Self  Patient: Obie Woods  Date of Visit: 2/15/2024    HISTORY  PATIENT INFORMATION  Peggy was seen for follow-up today.  Please also refer to Obie Woods was last seen on 5/4/21 for gender affirming voice therapy.    Impressions from most recent evaluation (12/23/20):  \"IMPRESSIONS: Obie Woods is a 25 year old adult, presenting today with voice and resonance disorder in the context of gender dysphoria, as well as dysphonia and dyspnea, as evidenced by today's evaluation.   \"    PROGRESS SINCE LAST VISIT    Peggy reports:  Reported that they had some issues with a cough last Fall, but they have since resolved.  Still they return today as the symptoms they experienced were quire significant.   voice quality great and authentic; no concerns at this time    PROGRESS ON LONG-TERM GOALS: set back with vocal hygiene.  Voice meets their personal goals and needs.      PATIENT REPORTED MEASURES  Patient Supplied Answers To VHI Questionnaire      5/4/2021    11:00 AM   Voice Handicap Index (VHI-10)   My voice makes it difficult for people to hear me 1 " "  People have difficulty understanding me in a noisy room 2   My voice difficulties restrict my personal and social life.  1   I feel left out of conversations because of my voice 0   My voice problem causes me to lose income 0   I feel as though I have to strain to produce voice 2   The clarity of my voice is unpredictable 1   My voice problem upsets me 1   My voice makes me feel handicapped 1   People ask, \"What's wrong with your voice?\" 0   VHI-10 9     Patient Supplied Answers To VHI Questionnaire      2/15/2024     3:00 PM   Voice Handicap Index (VHI-10)   My voice makes it difficult for people to hear me 2   People have difficulty understanding me in a noisy room 3   My voice difficulties restrict my personal and social life.  0   I feel left out of conversations because of my voice 0   My voice problem causes me to lose income 0   I feel as though I have to strain to produce voice 2   The clarity of my voice is unpredictable 2   My voice problem upsets me 1   My voice makes me feel handicapped 0   People ask, \"What's wrong with your voice?\" 0   VHI-10 10       Patient Supplied Answers To CSI Questionnaire       No data to display                Patient Supplied Answers To EAT Questionnaire       No data to display                  OBJECTIVE FINDINGS  PERCEPTUAL EVALUATION (CPT 84652)  POSTURE / TENSION:   WNL    BREATHING:   appears within normal limits and adequate     LARYNGEAL PALPATION:   supple musculature    VOICE:  Roughness: Minimal Intermittent  Breathiness: WNL  Strain: WNL  Loudness  Conversational speech:  WNL  Projected speech:  WNL  Pitch:  F0/ Habitual Pitch: informally judged to be WNL  Global Overall Severity:  5/100    COUGH/THROAT CLEARING:  Not observed    LARYNGEAL FUNCTION STUDIES (CPT 28791)  Captured    LARYNGEAL EXAMINATION    IMPRESSIONS/ RECOMMENDATIONS/ PLAN  IMPRESSIONS: Obie Woods is a 29 year old, presenting today for follow-up with Irritable Larynx Syndrome (ILS) (J38.7), " Chronic Cough (R05.3), Dysphonia (R49.0), and Voice and Resonance Disorder (R49.9).  They were seen in the past for voice concerns related to vocal dysphoria, however, their current concerns were related to a severe cough that started last Fall with illness and eventually resolved.  Remarkable findings included:  Perceptual evaluation demonstrated:   Roughness: Minimal Intermittent  Breathiness: WNL  Strain: WNL  Laryngeal exam demonstrated:   Not warranted  Primary complaint of patient today included:   Seeking education to help manage any future severe bouts of coughing.   Therefore, we recommended a course of speech therapy to address these concerns.    STIMULABILITY: results of therapy probes during perceptual and laryngeal evaluation demonstrate improvement with use of forward resonant stimuli, coordination of respiration and phonation, and use of yawn sigh    RECOMMENDATIONS:   An single session of speech therapy is recommended to optimize vocal technique and improve voice quality.  Research: This patient is not - cough has resolved.    GOALS:  Patient goal:   To improve and maintain a healthy voice quality  To understand the problem and fix it as much as possible  To learn strategies to suppress future severe coughs to acceptable levels    Short-term goal(s): Within the first 4 sessions,   Peggy:  will be able to demonstrate provided cough suppression and substitution strategies from memory independently with 90% accuracy  will be able to independently list key factors in maintenance of good vocal hygiene with 80% accuracy, and report on their use outside the therapy room.  will demonstrate semi-occluded vocal tract (SOVT) exercises with at least 80% accuracy with no clinician support    Long-term goal(s): In 6 months,   Peggy will:  Report resolution of symptoms, and use of optimal voice quality and comfort to meet personal, social, and professional needs, 90% of the time during a typical week of vocal  activities    Certification period:   Certification date from: 2/15/24  Certification date to: 5/15/24    This treatment plan was developed with the patient who agreed with the recommendations.    TOTAL SERVICE TIME: 60 minutes  EVALUATION OF VOICE AND RESONANCE (33182)  TREATMENT (14260)  NO CHARGE FACILITY FEE (05136)    Earnestine Byrd M.M. (voice), M.A., CCC/SLP  Speech-Language Pathologist  Certificate of Vocology  [facility]  [phone]  [email or web address]  Pronouns: she/her                                                                                                     Outpatient Speech Language Therapy Evaluation  PLAN OF TREATMENT FOR OUTPATIENT REHABILITATION  (COMPLETE FOR INITIAL CLAIMS ONLY)  Patient's Last Name, First Name, M.I.  YOB: 1995  PeggyRamila  S                        Provider's Name  KYLEE Belcher Medical Record No.  [id number]                               Onset Date:  2/15/24   Start of Care Date: 2/15/24     Type: Speech Language Therapy Medical Diagnosis: No primary diagnosis found.                        Therapy Diagnosis:  No primary diagnosis found.   Visits from SOC:  1   _________________________________________________________________________________  Plan of Treatment:   Speech therapy    DURATION/FREQUENCY OF TREATMENT  Six weekly, one-hour sessions, with two monthly one-hour follow-up sessions    PROGNOSIS  Good prognosis for voice improvement with speech therapy and regular practice of therapeutic activities.    BARRIERS TO LEARNING/TEACHING AND LEARNING NEEDS  None/Unremarkable    GOALS:  Patient goal:   To improve and maintain a healthy voice quality  To understand the problem and fix it as much as possible  To learn strategies to suppress future severe coughs to acceptable levels    Short-term goal(s): Within the first 4 sessions,   Peggy:  will be able to demonstrate provided cough suppression and substitution strategies  from memory independently with 90% accuracy  will be able to independently list key factors in maintenance of good vocal hygiene with 80% accuracy, and report on their use outside the therapy room.  will demonstrate semi-occluded vocal tract (SOVT) exercises with at least 80% accuracy with no clinician support    Long-term goal(s): In 6 months,   Peggy will:  Report resolution of symptoms, and use of optimal voice quality and comfort to meet personal, social, and professional needs, 90% of the time during a typical week of vocal activities  _________________________________________________________________________________    I CERTIFY THE NEED FOR THESE SERVICES FURNISHED UNDER        THIS PLAN OF TREATMENT AND WHILE UNDER MY CARE     (Physician attestation of this document indicates review and certification of the therapy plan).     Certification date from: 2/15/24  Certification date to: 5/15/24    Referring Provider: Referred Self         Again, thank you for allowing me to participate in the care of your patient.      Sincerely,    Earnestine Byrd, SLP

## 2024-02-15 NOTE — PATIENT INSTRUCTIONS
"After Visit Summary    Patient: Obie Woods  Date of Visit: 2/15/2024    These notes are also available in your MyChart. Please take a few moments to find them under \"Past Appointments\" in the [employer] system, as Earnestine will start to phase out e-mail communications.    \"Handouts\" that go along with today's order of activities include (below):   Frequency of practice: 2-3x/day unless marked otherwise    Order of today's appointment:      Irritable Larynx Syndrome    What is Irritable Larynx Syndrome?  Irritable Larynx Syndrome (ILS) is a cluster of symptoms not associated with a specific disease process. Individuals with ILS can have any combination of the following complaints:      Globus sensation (feeling of lump or some other sensation in the throat)  Throat irritation or burning sensation  Tightness of throat or neck  Chronic cough or throat clearing; sensation of need to clear throat  Effort or pain with swallowing  Paradoxical vocal fold motion (sensation of difficulty inhaling)  Laryngospasm (tightening of throat causing choking or difficulty inhaling)    What causes ILS?  ILS works in the same way as a mosquito bite: We might scratch the bite absentmindedly a couple of times, and suddenly we realize the bite really itches!  So we scratch it vigorously because that feels better for a few moments. In the long run though, scratching actually makes the itch worse.  In the same way, when individuals experience mild irritation in their throats for some reason, they might not realize that they've begun  scratching  the  itch,  (throat clearing) but over time the irritation worsens and becomes more noticeable.  This is how earlier, milder symptoms can be the precursors to more-severe symptoms. Chronic irritation of the mucosa in the laryngeal area can cause changes to the nerve pathways; they can become hyper-excitable, so that it only takes a small irritation to have a large sensory response (like a cough).  It's " nice that the nervous system can learn, but in this case it has backfired!    Here are some possible irritants that can start the chain reaction (most individuals have more than one):  Upper respiratory infection with cough  Acid Reflux  Post Nasal Drainage  Allergens (e.g. tree, mold, pollen, pet dander)  Cigarette smoke or other kinds of smoke  Odors (e.g. perfume, hairspray)  Food sensitivities  Strong Emotions (e.g. anxiety, stress)  Hyperfunction of the muscles of the vocal mechanism  Harsh chemicals/  Cold Air    Evaluation of ILS  At the [facility], an otolaryngologist and a speech-language pathologist work as a team to determine if ILS is a problem.  Medical evaluation and laryngeal examination rule out any other cause for the symptoms.  Some medical treatment may be advised.  Functional evaluation determines whether there are behavioral or lifestyle factors that are contributing to the symptoms.      Treatment of ILS  Treatment of ILS addresses the cause of irritation. This can include:   Treatment of Acid Reflux This may include: Medications (what your MD prescribes), Dietary Precautions (what you eat and what supplements you take), Lifestyle Precautions (when you eat, avoiding environmental irritants), and Mechanical Precautions (how much pressure you put on your lower esophageal sphincter).  Functional Speech Therapy with a Speech-Language Pathologist (SLP). Your SLP will educate you about the disorder, help you improve the environment of your mucosa to reduce irritation, improve the movement and function of your larynx, and help reduce muscle tension and restore muscle balance.  Further Medical treatment is sometimes used to restore the medical basis for normal function and sensation.  Your MD will discuss these possibilities with you if they are relevant.      Cough and Throat Clearing  The biological purpose of the larynx is to protect the airway (trachea and lungs). Coughing and  throat clearing are mechanisms that are meant to assist in the protection of our airway. When we feel something such as liquid, food, saliva, dust, etc. near our vocal folds, we will clear our throats and cough as a protective reflex. We also cough or throat clear if our airway is irritated.     Why can chronic coughing and throat clearing be harmful?  A cough is produced by squeezing the vocal folds together, building up pressure in the lungs, and then quickly forcing the vocal folds open to clear away whatever might be getting too close to our airway. This can be traumatic to the vocal folds, irritating them in the same way that our hands would be irritated if they were being slapped together over and over. Coughing for a long period of time can cause the mucosa of the larynx and vocal folds to become hypersensitive, making it feel like something is threatening the airway.  The vocal folds need to cough or throat clear even when there isn't an actual physical threat to the airway.  The chronic coughing or throat clearing results in even more coughing or throat clearing.      Strategies to Reduce Irritation  Your speech-language pathologist will teach you techniques to use muscles optimally, in order to reduce irritation.  In the meantime, you can start reducing the irritation, using the following strategies:    Identifying your unique trigger; take steps to avoid it  Improve both systemic and topical (surface) hydration (dry mucosa is more easily irritated)  Drink adequate fluids   Use a humidifier, especially in the bedroom at night  Guaifenesin (e.g. Mucinex) to thin viscous secretions  Sucking on candy, or cough drops without menthol, or chew gum, to increase salivary stimulation    Alternative Behaviors to coughing/ throat clearing  Swallow hard  Stimulate your oral cavity:   Sip hot, cold, carbonated, or flavored water  Suck on ice chips  Bite your tongue or cheek (not too hard!)  Massage under your chin (see  below)  Gargle:      Gentle hums or vocal exercises taught to you by your SLP  Say  eh eh eh eh  silently (for a gentle, non-irritating throat-clear) +swallow  Sniff or pursed lip inhale and exhale on  Shhh  like shushing a baby  Sniff or pursed lip inhale and exhale on  zzz  like a buzzing bee  Wait. While you are waiting, try the above behaviors instead     Dexter Wang Jr., MATEO Cowart M.D.     Paty Macias M.D.       Marli Hook, Ph.D.  Earnestine Byrd M.M., M.A.                 Austyn Archibald M.M., M.A.           ANJELICA Armenta.M. M.S.         [phone]        Earnestine Byrd    Tips to Improve Topical Hydration and Reduce Laryngeal Irritation    Nasal Irrigation:  morning and night    times a day  Saline nasal spray __2-3x_________ per day  Saline gel  Gargle:  Using Saline    morning and night     times a day   With plain water (room temp or warm)  Throughout the day (2-3x/day, quincy. after meals, or when having increased throat irritation symptoms).  To help reduce throat irritation, the feeling of mucus in the throat, improve topical hydration and to reduce throat clearing, coughing.  Saline Recipe: For garglin-8 oz glass   warm water   tap or distilled (your preference)  feel for right temperature (not too hot or cold)  warm enough to dissolve the salt  1/4 tsp. kosher salt, or sea salt   Additives in table salt can cause irritation/ discomfort.  1/4 tsp. baking soda  (OR alternate salt and soda with one saline packet) - Neilmed is a common brand found in the sinus aisle.        Nasal Dryness:  For light congestion, post-nasal drainage, dryness in the nasal passages, try a saline nasal spray (very cheap - any brand).  Saline nasal gel, like Ayr or NasoGEL are also very helpful. [place] can be found at [facility], but there are many similar products to be found in the allergy/nasal aisle of any drug or retail store.  Saline gels also help with colds/flu, allergies,  winter dryness, low humidity, CPAP/BiPaP, chronic Sinusitis, flying, nosebleeds (a.k.a.: epistaxis), and oxygen therapy.    Lozenges:                     Non-Menthol drops are recommended since menthol is        drying, which includes (regular or sugar-free):          Ludens, Katarina Bregermaners, [employer], Life Savers, Chain-O-Lakes                                           Ranchers.  Most Ricola drops have menthol (check the                                          package before you buy). Everything in moderation -        maintain good oral hygiene and avoid cavities.  Diagonal (xlear.com) and other xylitol products are also recommended.    Humidifiers:   I recommend Miller or Mosley, which tend to be quieter than other brands.  Use especially while sleeping, for improved nasal/ oral topical hydration, and improved sleep.  Please consider purchasing one at the end of the season (March/April), if you cannot afford one now.      Consider the humidifier for use especially at night time when there is minimal systemic hydration.  Fill with enough water for 1-2 nights; clean base and water container at least once per week with a water/vinegar mixture.  Always follow instructions as directed by .        Steam:   Facial steamers/ warm, moist washcloth __1____x/day for __1-2______minutes.       Dexter Wang Jr., MATEO Cowart M.D.            Marli Hook, Ph.D.  Earnestine Byrd M.M., M.A.                 Austyn Archibald M.M., M.A.                    [phone]       Laryngeal Massage    Tongue Base Massage               X per day  Using the knuckle of your index finger or thumb, begin massaging in small, gentle circles right under the chin.  You can also hold your chin with your index finger and use your thumb.  Work along the sides, middle, and tip of the bottom of the chin with a gentle pressure.    It is important to keep the chin level or down to avoid putting strain on the neck  muscles.    Laryngeal Massage   Using the index finger, find the Martinez's / Mirlande's apple.  From there, slide the index finger and thumb along the side of the larynx until you find the thyrohyoid space. These are little pockets on each side of the larynx.  With a gentle pressure, massage in circles around that area.  After a few circles, with your fingers still in the thyrohyoid space, rock your thumb and index finger up and down alternately; much like a see-saw motion.    Next use your thumb and index finger to lift gently and hold this position for a moment, and then gently pull down and hold this position for a moment.  You may also move the main bulk of the larynx side to side.  You may experience a slight  crunchy  (a.k.a. crepitus) quality to this movement.  This should not be a cause for concern, and unless it is causing discomfort, you should be able to continue this massage.  Once things feel loosened up, give a slight squeeze in the thyrohyoid space, and pull down and forward in a V shape towards the front of the trachea.    Sternocleidomastoid Massage  Starting directly below the ears, use your hands to massage in circles. Work your way down the sides of the neck, and into the shoulders. Work back up the side of the neck, and then work down the front of the neck.    You may use slightly more pressure for this massage, but stop if there is pain / throbbing.  Try turning your head to be better able to find these sternocleidomastoid muscles.  Use your hands to pull down along these muscles from below the ear to the shoulders, and from below the ear in a V shape towards the front.  Use your fingers to gently massage the  little twiggies  (a.k.a.: sternal origin of the Sternocleidomastoid Muscle that attaches to the medial end of the clavicle/collarbone) one at a time.    Warm Compress  5-15 min            1-2  X per day  After an especially vocally fatiguing day, alternate massages with a compress.          Earnestine  ERNA Byrd (voice), M.A., CCC/SLP  Speech-Language Pathologist  [facility] Certified Vocologist  [facility]  [email or web address]  she/her

## 2024-02-22 NOTE — PROGRESS NOTES
Outpatient Speech Language Therapy Evaluation  PLAN OF TREATMENT FOR OUTPATIENT REHABILITATION  (COMPLETE FOR INITIAL CLAIMS ONLY)  Patient's Last Name, First Name, M.I.  YOB: 1995  PeggyRamila  S                        Provider's Name  Earnestine Byrd, SLP Medical Record No.  [id number]                               Onset Date:  2/15/24   Start of Care Date: 2/15/24     Type: Speech Language Therapy Medical Diagnosis: Irritable Larynx Syndrome (ILS) (J38.7), Chronic Cough (R05.3), Dysphonia (R49.0), and Voice and Resonance Disorder (R49.9).                         Therapy Diagnosis:  Irritable Larynx Syndrome (ILS) (J38.7), Chronic Cough (R05.3), Dysphonia (R49.0), and Voice and Resonance Disorder (R49.9).    Visits from SOC:  1   _________________________________________________________________________________  Plan of Treatment:   Speech therapy    DURATION/FREQUENCY OF TREATMENT  1 one-hour session    PROGNOSIS  Good prognosis for voice improvement with speech therapy and regular practice of therapeutic activities.    BARRIERS TO LEARNING/TEACHING AND LEARNING NEEDS  None/Unremarkable    GOALS:  Patient goal:   To improve and maintain a healthy voice quality  To understand the problem and fix it as much as possible  To learn strategies to suppress future severe coughs to acceptable levels    Short-term goal(s): Within the first 4 sessions,   Peggy:  will be able to demonstrate provided cough suppression and substitution strategies from memory independently with 90% accuracy  will be able to independently list key factors in maintenance of good vocal hygiene with 80% accuracy, and report on their use outside the therapy room.  will demonstrate semi-occluded vocal tract (SOVT) exercises with at least 80% accuracy with no clinician support    Long-term goal(s): In 6 months,  Peggy will:  Report resolution of  symptoms, and use of optimal voice quality and comfort to meet personal, social, and professional needs, 90% of the time during a typical week of vocal activities  _________________________________________________________________________________    I CERTIFY THE NEED FOR THESE SERVICES FURNISHED UNDER        THIS PLAN OF TREATMENT AND WHILE UNDER MY CARE     (Physician attestation of this document indicates review and certification of the therapy plan).     Certification date from: 2/15/24  Certification date to: 5/15/24    Referring Provider: Tray    [facility]  [address] [place] [address]

## 2024-06-02 ENCOUNTER — HEALTH MAINTENANCE LETTER (OUTPATIENT)
Age: 29
End: 2024-06-02

## 2025-06-14 ENCOUNTER — HEALTH MAINTENANCE LETTER (OUTPATIENT)
Age: 30
End: 2025-06-14

## 2025-08-28 ENCOUNTER — TRANSCRIBE ORDERS (OUTPATIENT)
Dept: OTHER | Age: 30
End: 2025-08-28

## 2025-08-28 DIAGNOSIS — Q79.60 EHLERS-DANLOS SYNDROME: Primary | ICD-10-CM

## (undated) DEVICE — SOL WATER IRRIG 500ML BOTTLE 2F7113

## (undated) DEVICE — GLOVE PROTEXIS MICRO 6.5  2D73PM65

## (undated) DEVICE — PACK MINOR CUSTOM ASC

## (undated) DEVICE — ESU PENCIL SMOKE EVAC W/ROCKER SWITCH 0703-047-000

## (undated) DEVICE — SOL NACL 0.9% IRRIG 500ML BOTTLE 2F7123

## (undated) DEVICE — PREP SCRUB CARE CHLOROXYLENOL (PCMX) 4OZ 29902-004

## (undated) DEVICE — GLOVE PROTEXIS BLUE W/NEU-THERA 7.0  2D73EB70

## (undated) DEVICE — SU VICRYL 3-0 FS-1 27" J442H

## (undated) DEVICE — SOL ADH LIQUID BENZOIN SWAB 0.6ML C1544

## (undated) DEVICE — STPL SKIN 35W APPOSE 8886803712

## (undated) DEVICE — RX BACITRACIN OINTMENT 14G 0.5OZ 45802-060-01

## (undated) DEVICE — DRAPE UNDER BUTTOCK 8483

## (undated) DEVICE — ESU ELEC BLADE HEX-LOCKING 2.5" E1450X

## (undated) DEVICE — LINEN TOWEL PACK X5 5464

## (undated) DEVICE — BNDG ELASTIC 6" DBL LENGTH UNSTERILE 6611-16

## (undated) DEVICE — STRAP KNEE/BODY 31143004

## (undated) DEVICE — BLADE KNIFE SURG 10 371110

## (undated) DEVICE — ESU GROUND PAD ADULT W/CORD E7507

## (undated) DEVICE — Device

## (undated) DEVICE — DRAIN JACKSON PRATT 15FR ROUND SU130-1323

## (undated) DEVICE — SUCTION TIP YANKAUER STR K87

## (undated) DEVICE — PREP CHLORAPREP 26ML TINTED ORANGE  260815

## (undated) DEVICE — SU SILK 2-0 SH 30" K833H

## (undated) DEVICE — BNDG ELASTIC 6"X5YDS UNSTERILE 6611-60

## (undated) DEVICE — JELLY LUBRICATING SURGILUBE 2OZ TUBE

## (undated) DEVICE — SU VICRYL 4-0 PS-2 18" UND J496H

## (undated) DEVICE — CLOSURE SYS SKIN PREMIERPRO EXOFINFUSION 4X60CM 3473

## (undated) DEVICE — SPONGE LAP 18X18" X8435

## (undated) DEVICE — GLOVE PROTEXIS BLUE W/NEU-THERA 6.5  2D73EB65

## (undated) DEVICE — SU ETHILON 3-0 PS-1 18" 1663H

## (undated) DEVICE — GLOVE PROTEXIS W/NEU-THERA 6.5  2D73TE65

## (undated) DEVICE — PAD CHUX UNDERPAD 30X30"

## (undated) DEVICE — SUCTION MANIFOLD NEPTUNE 2 SYS 1 PORT 702-025-000

## (undated) DEVICE — DRAPE LAP TRANSVERSE 29421

## (undated) DEVICE — DRSG ABDOMINAL 07 1/2X8" 7197D

## (undated) DEVICE — BAG URIMETER FOLEY KIT W/16FR FOLEY

## (undated) DEVICE — DRSG KERLIX 4 1/2"X4YDS ROLL 6730

## (undated) DEVICE — PEN MARKING SKIN TYCO DEVON DUAL TIP 31145868

## (undated) DEVICE — DRAIN JACKSON PRATT RESERVOIR 100ML SU130-1305

## (undated) RX ORDER — HYDROMORPHONE HYDROCHLORIDE 1 MG/ML
INJECTION, SOLUTION INTRAMUSCULAR; INTRAVENOUS; SUBCUTANEOUS
Status: DISPENSED
Start: 2021-09-17

## (undated) RX ORDER — BUPIVACAINE HYDROCHLORIDE 5 MG/ML
INJECTION, SOLUTION EPIDURAL; INTRACAUDAL
Status: DISPENSED
Start: 2022-04-12

## (undated) RX ORDER — PROPOFOL 10 MG/ML
INJECTION, EMULSION INTRAVENOUS
Status: DISPENSED
Start: 2021-09-17

## (undated) RX ORDER — CEFAZOLIN SODIUM 1 G/3ML
INJECTION, POWDER, FOR SOLUTION INTRAMUSCULAR; INTRAVENOUS
Status: DISPENSED
Start: 2021-09-17

## (undated) RX ORDER — GLYCOPYRROLATE 0.2 MG/ML
INJECTION INTRAMUSCULAR; INTRAVENOUS
Status: DISPENSED
Start: 2021-09-17

## (undated) RX ORDER — FENTANYL CITRATE 50 UG/ML
INJECTION, SOLUTION INTRAMUSCULAR; INTRAVENOUS
Status: DISPENSED
Start: 2021-09-17

## (undated) RX ORDER — KETOROLAC TROMETHAMINE 30 MG/ML
INJECTION, SOLUTION INTRAMUSCULAR; INTRAVENOUS
Status: DISPENSED
Start: 2021-09-17

## (undated) RX ORDER — LIDOCAINE HYDROCHLORIDE 20 MG/ML
INJECTION, SOLUTION EPIDURAL; INFILTRATION; INTRACAUDAL; PERINEURAL
Status: DISPENSED
Start: 2021-09-17

## (undated) RX ORDER — LIDOCAINE HYDROCHLORIDE 10 MG/ML
INJECTION, SOLUTION EPIDURAL; INFILTRATION; INTRACAUDAL; PERINEURAL
Status: DISPENSED
Start: 2022-04-12

## (undated) RX ORDER — ACETAMINOPHEN 325 MG/1
TABLET ORAL
Status: DISPENSED
Start: 2021-09-17

## (undated) RX ORDER — ONDANSETRON 2 MG/ML
INJECTION INTRAMUSCULAR; INTRAVENOUS
Status: DISPENSED
Start: 2021-09-17

## (undated) RX ORDER — GABAPENTIN 300 MG/1
CAPSULE ORAL
Status: DISPENSED
Start: 2022-04-12

## (undated) RX ORDER — BUPIVACAINE HYDROCHLORIDE 2.5 MG/ML
INJECTION, SOLUTION EPIDURAL; INFILTRATION; INTRACAUDAL
Status: DISPENSED
Start: 2021-09-17

## (undated) RX ORDER — ACETAMINOPHEN 325 MG/1
TABLET ORAL
Status: DISPENSED
Start: 2022-04-12

## (undated) RX ORDER — KETOROLAC TROMETHAMINE 30 MG/ML
INJECTION, SOLUTION INTRAMUSCULAR; INTRAVENOUS
Status: DISPENSED
Start: 2022-04-12

## (undated) RX ORDER — FENTANYL CITRATE-0.9 % NACL/PF 10 MCG/ML
PLASTIC BAG, INJECTION (ML) INTRAVENOUS
Status: DISPENSED
Start: 2021-09-17

## (undated) RX ORDER — OXYCODONE HYDROCHLORIDE 5 MG/1
TABLET ORAL
Status: DISPENSED
Start: 2021-09-17

## (undated) RX ORDER — GABAPENTIN 300 MG/1
CAPSULE ORAL
Status: DISPENSED
Start: 2021-09-17